# Patient Record
Sex: MALE | Race: WHITE | NOT HISPANIC OR LATINO
[De-identification: names, ages, dates, MRNs, and addresses within clinical notes are randomized per-mention and may not be internally consistent; named-entity substitution may affect disease eponyms.]

---

## 2017-02-15 ENCOUNTER — APPOINTMENT (OUTPATIENT)
Dept: INTERNAL MEDICINE | Facility: CLINIC | Age: 67
End: 2017-02-15

## 2017-02-16 ENCOUNTER — RESULT CHARGE (OUTPATIENT)
Age: 67
End: 2017-02-16

## 2017-02-16 LAB
GLUCOSE UR-MCNC: 0
HGB UR QL STRIP.AUTO: 0
PROT UR STRIP-MCNC: 0
SP GR UR STRIP: 1.02

## 2017-04-15 ENCOUNTER — EMERGENCY (EMERGENCY)
Facility: HOSPITAL | Age: 67
LOS: 0 days | Discharge: ANOTHER HOSPITAL | End: 2017-04-15
Attending: EMERGENCY MEDICINE
Payer: MEDICARE

## 2017-04-15 ENCOUNTER — TRANSCRIPTION ENCOUNTER (OUTPATIENT)
Age: 67
End: 2017-04-15

## 2017-04-15 ENCOUNTER — INPATIENT (INPATIENT)
Facility: HOSPITAL | Age: 67
LOS: 2 days | Discharge: HOME | End: 2017-04-18
Attending: SURGERY | Admitting: SURGERY
Payer: MEDICARE

## 2017-04-15 VITALS
SYSTOLIC BLOOD PRESSURE: 204 MMHG | WEIGHT: 270.07 LBS | TEMPERATURE: 98 F | HEIGHT: 70 IN | DIASTOLIC BLOOD PRESSURE: 100 MMHG | OXYGEN SATURATION: 97 % | RESPIRATION RATE: 20 BRPM | HEART RATE: 60 BPM

## 2017-04-15 VITALS
TEMPERATURE: 99 F | SYSTOLIC BLOOD PRESSURE: 121 MMHG | DIASTOLIC BLOOD PRESSURE: 74 MMHG | OXYGEN SATURATION: 96 % | HEART RATE: 66 BPM | RESPIRATION RATE: 16 BRPM

## 2017-04-15 VITALS
SYSTOLIC BLOOD PRESSURE: 123 MMHG | RESPIRATION RATE: 15 BRPM | OXYGEN SATURATION: 92 % | DIASTOLIC BLOOD PRESSURE: 67 MMHG | HEART RATE: 63 BPM | TEMPERATURE: 98 F

## 2017-04-15 DIAGNOSIS — E78.5 HYPERLIPIDEMIA, UNSPECIFIED: ICD-10-CM

## 2017-04-15 DIAGNOSIS — I71.3 ABDOMINAL AORTIC ANEURYSM, RUPTURED: ICD-10-CM

## 2017-04-15 DIAGNOSIS — F17.210 NICOTINE DEPENDENCE, CIGARETTES, UNCOMPLICATED: ICD-10-CM

## 2017-04-15 DIAGNOSIS — I25.10 ATHEROSCLEROTIC HEART DISEASE OF NATIVE CORONARY ARTERY WITHOUT ANGINA PECTORIS: ICD-10-CM

## 2017-04-15 DIAGNOSIS — R01.1 CARDIAC MURMUR, UNSPECIFIED: ICD-10-CM

## 2017-04-15 DIAGNOSIS — R10.9 UNSPECIFIED ABDOMINAL PAIN: ICD-10-CM

## 2017-04-15 DIAGNOSIS — I10 ESSENTIAL (PRIMARY) HYPERTENSION: ICD-10-CM

## 2017-04-15 DIAGNOSIS — I45.10 UNSPECIFIED RIGHT BUNDLE-BRANCH BLOCK: ICD-10-CM

## 2017-04-15 LAB
ALBUMIN SERPL ELPH-MCNC: 3.6 G/DL — SIGNIFICANT CHANGE UP (ref 3.3–5)
ALP SERPL-CCNC: 90 U/L — SIGNIFICANT CHANGE UP (ref 40–120)
ALT FLD-CCNC: 117 U/L — HIGH (ref 12–78)
ANION GAP SERPL CALC-SCNC: 12 MMOL/L — SIGNIFICANT CHANGE UP (ref 5–17)
APPEARANCE UR: ABNORMAL
APTT BLD: 24.9 SEC — LOW (ref 27.5–37.4)
AST SERPL-CCNC: 48 U/L — HIGH (ref 15–37)
BACTERIA # UR AUTO: ABNORMAL
BASOPHILS # BLD AUTO: 0.03 K/UL — SIGNIFICANT CHANGE UP (ref 0–0.2)
BASOPHILS # BLD AUTO: 0.1 K/UL — SIGNIFICANT CHANGE UP (ref 0–0.2)
BASOPHILS NFR BLD AUTO: 0.2 % — SIGNIFICANT CHANGE UP (ref 0–2)
BASOPHILS NFR BLD AUTO: 0.7 % — SIGNIFICANT CHANGE UP (ref 0–2)
BILIRUB SERPL-MCNC: 1 MG/DL — SIGNIFICANT CHANGE UP (ref 0.2–1.2)
BILIRUB UR-MCNC: NEGATIVE — SIGNIFICANT CHANGE UP
BUN SERPL-MCNC: 18 MG/DL — SIGNIFICANT CHANGE UP (ref 7–23)
CALCIUM SERPL-MCNC: 8.2 MG/DL — LOW (ref 8.5–10.1)
CHLORIDE SERPL-SCNC: 104 MMOL/L — SIGNIFICANT CHANGE UP (ref 96–108)
CK MB BLD-MCNC: 2.4 % — SIGNIFICANT CHANGE UP (ref 0–3.5)
CK MB CFR SERPL CALC: 5.8 NG/ML — HIGH (ref 0.5–3.6)
CK SERPL-CCNC: 246 U/L — SIGNIFICANT CHANGE UP (ref 26–308)
CO2 SERPL-SCNC: 23 MMOL/L — SIGNIFICANT CHANGE UP (ref 22–31)
COLOR SPEC: YELLOW — SIGNIFICANT CHANGE UP
CREAT SERPL-MCNC: 1.23 MG/DL — SIGNIFICANT CHANGE UP (ref 0.5–1.3)
DIFF PNL FLD: NEGATIVE — SIGNIFICANT CHANGE UP
EOSINOPHIL # BLD AUTO: 0 K/UL — SIGNIFICANT CHANGE UP (ref 0–0.5)
EOSINOPHIL # BLD AUTO: 0.01 K/UL — SIGNIFICANT CHANGE UP (ref 0–0.5)
EOSINOPHIL NFR BLD AUTO: 0.1 % — SIGNIFICANT CHANGE UP (ref 0–6)
EOSINOPHIL NFR BLD AUTO: 0.4 % — SIGNIFICANT CHANGE UP (ref 0–6)
GLUCOSE SERPL-MCNC: 197 MG/DL — HIGH (ref 70–99)
GLUCOSE UR QL: NEGATIVE MG/DL — SIGNIFICANT CHANGE UP
HCT VFR BLD CALC: 43.2 % — SIGNIFICANT CHANGE UP (ref 39–50)
HCT VFR BLD CALC: 43.5 % — SIGNIFICANT CHANGE UP (ref 39–50)
HGB BLD-MCNC: 13.8 G/DL — SIGNIFICANT CHANGE UP (ref 13–17)
HGB BLD-MCNC: 15 G/DL — SIGNIFICANT CHANGE UP (ref 13–17)
IMM GRANULOCYTES NFR BLD AUTO: 0.7 % — SIGNIFICANT CHANGE UP (ref 0–1.5)
INR BLD: 1.01 RATIO — SIGNIFICANT CHANGE UP (ref 0.88–1.16)
KETONES UR-MCNC: NEGATIVE — SIGNIFICANT CHANGE UP
LEUKOCYTE ESTERASE UR-ACNC: NEGATIVE — SIGNIFICANT CHANGE UP
LYMPHOCYTES # BLD AUTO: 1.5 K/UL — SIGNIFICANT CHANGE UP (ref 1–3.3)
LYMPHOCYTES # BLD AUTO: 1.56 K/UL — SIGNIFICANT CHANGE UP (ref 1–3.3)
LYMPHOCYTES # BLD AUTO: 10.6 % — LOW (ref 13–44)
LYMPHOCYTES # BLD AUTO: 11.1 % — LOW (ref 13–44)
MCHC RBC-ENTMCNC: 31.9 % — LOW (ref 32–36)
MCHC RBC-ENTMCNC: 31.9 PG — SIGNIFICANT CHANGE UP (ref 27–34)
MCHC RBC-ENTMCNC: 32.4 PG — SIGNIFICANT CHANGE UP (ref 27–34)
MCHC RBC-ENTMCNC: 34.3 GM/DL — SIGNIFICANT CHANGE UP (ref 32–36)
MCV RBC AUTO: 100 FL — SIGNIFICANT CHANGE UP (ref 80–100)
MCV RBC AUTO: 94.5 FL — SIGNIFICANT CHANGE UP (ref 80–100)
MONOCYTES # BLD AUTO: 0.5 K/UL — SIGNIFICANT CHANGE UP (ref 0–0.9)
MONOCYTES # BLD AUTO: 0.89 K/UL — SIGNIFICANT CHANGE UP (ref 0–0.9)
MONOCYTES NFR BLD AUTO: 3.8 % — SIGNIFICANT CHANGE UP (ref 2–14)
MONOCYTES NFR BLD AUTO: 6.1 % — SIGNIFICANT CHANGE UP (ref 2–14)
NEUTROPHILS # BLD AUTO: 11.3 K/UL — HIGH (ref 1.8–7.4)
NEUTROPHILS # BLD AUTO: 12.08 K/UL — HIGH (ref 1.8–7.4)
NEUTROPHILS NFR BLD AUTO: 82.3 % — HIGH (ref 43–77)
NEUTROPHILS NFR BLD AUTO: 84 % — HIGH (ref 43–77)
NITRITE UR-MCNC: NEGATIVE — SIGNIFICANT CHANGE UP
PH UR: 7 — SIGNIFICANT CHANGE UP (ref 4.8–8)
PLATELET # BLD AUTO: 179 K/UL — SIGNIFICANT CHANGE UP (ref 150–400)
PLATELET # BLD AUTO: 232 K/UL — SIGNIFICANT CHANGE UP (ref 150–400)
PMV BLD: 9.3 FL — SIGNIFICANT CHANGE UP (ref 7–13)
POTASSIUM SERPL-MCNC: 4.5 MMOL/L — SIGNIFICANT CHANGE UP (ref 3.5–5.3)
POTASSIUM SERPL-SCNC: 4.5 MMOL/L — SIGNIFICANT CHANGE UP (ref 3.5–5.3)
PROT SERPL-MCNC: 6.9 GM/DL — SIGNIFICANT CHANGE UP (ref 6–8.3)
PROT UR-MCNC: 15 MG/DL
PROTHROM AB SERPL-ACNC: 11 SEC — SIGNIFICANT CHANGE UP (ref 9.8–12.7)
RBC # BLD: 4.32 M/UL — SIGNIFICANT CHANGE UP (ref 4.2–5.8)
RBC # BLD: 4.61 M/UL — SIGNIFICANT CHANGE UP (ref 4.2–5.8)
RBC # FLD: 12.7 % — SIGNIFICANT CHANGE UP (ref 11–15)
RBC # FLD: 13.2 % — SIGNIFICANT CHANGE UP (ref 10.3–14.5)
RH IG SCN BLD-IMP: NEGATIVE — SIGNIFICANT CHANGE UP
SODIUM SERPL-SCNC: 139 MMOL/L — SIGNIFICANT CHANGE UP (ref 135–145)
SP GR SPEC: 1.01 — SIGNIFICANT CHANGE UP (ref 1.01–1.02)
TROPONIN I SERPL-MCNC: <.015 NG/ML — SIGNIFICANT CHANGE UP (ref 0.01–0.04)
UROBILINOGEN FLD QL: NEGATIVE MG/DL — SIGNIFICANT CHANGE UP
WBC # BLD: 13.5 K/UL — HIGH (ref 3.8–10.5)
WBC # BLD: 14.67 K/UL — HIGH (ref 3.8–10.5)
WBC # FLD AUTO: 13.5 K/UL — HIGH (ref 3.8–10.5)
WBC # FLD AUTO: 14.67 K/UL — HIGH (ref 3.8–10.5)

## 2017-04-15 PROCEDURE — 99223 1ST HOSP IP/OBS HIGH 75: CPT | Mod: 57,GC

## 2017-04-15 PROCEDURE — 71010: CPT | Mod: 26

## 2017-04-15 PROCEDURE — 99291 CRITICAL CARE FIRST HOUR: CPT

## 2017-04-15 PROCEDURE — 74176 CT ABD & PELVIS W/O CONTRAST: CPT | Mod: 26

## 2017-04-15 RX ORDER — MORPHINE SULFATE 50 MG/1
2 CAPSULE, EXTENDED RELEASE ORAL ONCE
Qty: 0 | Refills: 0 | Status: DISCONTINUED | OUTPATIENT
Start: 2017-04-15 | End: 2017-04-15

## 2017-04-15 RX ORDER — SODIUM CHLORIDE 9 MG/ML
3 INJECTION INTRAMUSCULAR; INTRAVENOUS; SUBCUTANEOUS ONCE
Qty: 0 | Refills: 0 | Status: COMPLETED | OUTPATIENT
Start: 2017-04-15 | End: 2017-04-15

## 2017-04-15 RX ORDER — SODIUM CHLORIDE 9 MG/ML
1000 INJECTION INTRAMUSCULAR; INTRAVENOUS; SUBCUTANEOUS ONCE
Qty: 0 | Refills: 0 | Status: COMPLETED | OUTPATIENT
Start: 2017-04-15 | End: 2017-04-15

## 2017-04-15 RX ADMIN — SODIUM CHLORIDE 1000 MILLILITER(S): 9 INJECTION INTRAMUSCULAR; INTRAVENOUS; SUBCUTANEOUS at 18:24

## 2017-04-15 RX ADMIN — SODIUM CHLORIDE 3 MILLILITER(S): 9 INJECTION INTRAMUSCULAR; INTRAVENOUS; SUBCUTANEOUS at 18:21

## 2017-04-15 RX ADMIN — MORPHINE SULFATE 2 MILLIGRAM(S): 50 CAPSULE, EXTENDED RELEASE ORAL at 19:29

## 2017-04-15 NOTE — ED PROVIDER NOTE - PSH
S/P hernia repair  ' 2007   Umbilical with Mesh  S/P hernia repair  5/2012   Umbilical with Mesh  S/P laminectomy  ' 90's   Lumbar  S/P tonsillectomy  childhood

## 2017-04-15 NOTE — ED PROVIDER NOTE - PSYCHIATRIC, MLM
Alert and oriented to person, place, time/situation. normal mood and affect. no apparent risk to self or others. No SI/HI

## 2017-04-15 NOTE — ED ADULT NURSE NOTE - CHIEF COMPLAINT QUOTE
Pt arrives as transfer from [facility], initially evaluated for left-sided abdominal pain and found to have retroperitoneal hemorrhage and AAA.  Pt taken directly to Trauma B.

## 2017-04-15 NOTE — ED ADULT NURSE NOTE - OBJECTIVE STATEMENT
Pt received to TR B aaox4 ambulatory as transfer from [facility] where PT was found to have bleeding into abdomen from  recent AAA repair site. 20 g IV at R. AC from NVS, New 20g IV obtained at L. AC. by MARIUM Torres Pre-op labs sent as ordered.  Pt placed on heart monitor for transport accompanied to OR by ED tech Elida, and surgical MDs, valuables taken to security by ED tech Tripp.  Report given to OR MARIUM.

## 2017-04-15 NOTE — ED PROVIDER NOTE - CRITICAL CARE PROVIDED
interpretation of diagnostic studies/consultation with other physicians/documentation/consult w/ pt's family directly relating to pts condition/direct patient care (not related to procedure)/additional history taking

## 2017-04-15 NOTE — ED PROVIDER NOTE - OBJECTIVE STATEMENT
Oz: 67 M, h/o AAA repair, transferred from [place], accepted by [facility]. Dr. Woods. P/w flank pain. CT w/ L retroperitoneal hemorrhage concerning for ruptured AAA (prior repaired, now leaking around that). VSS.

## 2017-04-15 NOTE — ED ADULT NURSE NOTE - PMH
AAA (abdominal aortic aneurysm)  dx: 10/2012  CAD (coronary artery disease)    Current smoker  1ppd X 40 years  Heart murmur    Herniated lumbar intervertebral disc  surgically treated ' 90's.    dx: 10/2012: recurrent herniated Lumbar Discs. Furthur workup to be done after AAA repair  HTN (hypertension)    Hyperlipidemia    Inguinal hernia  RIH ' 2007 surgically treated  Right bundle branch block    Umbilical hernia  5/2012 surgically treated

## 2017-04-15 NOTE — ED ADULT TRIAGE NOTE - CHIEF COMPLAINT QUOTE
Pt arrives as transfer from [facility], initially evaluated for left-sided abdominal pain and found to have retroperitoneal hemorrhage and AAA.  Pt taken directly to Trauma B.

## 2017-04-15 NOTE — ED PROVIDER NOTE - PROGRESS NOTE DETAILS
Pt signed out from Dr. Ha pending CT scan. Ct shows ruptured AAA. Vascular surgery consulted and transferred to [facility]. Pt hemodynamically stable.

## 2017-04-16 DIAGNOSIS — I71.4 ABDOMINAL AORTIC ANEURYSM, WITHOUT RUPTURE: ICD-10-CM

## 2017-04-16 LAB
ALBUMIN SERPL ELPH-MCNC: 4.1 G/DL — SIGNIFICANT CHANGE UP (ref 3.3–5)
ALP SERPL-CCNC: 77 U/L — SIGNIFICANT CHANGE UP (ref 40–120)
ALT FLD-CCNC: 92 U/L — HIGH (ref 4–41)
APTT BLD: 25.1 SEC — LOW (ref 27.5–37.4)
APTT BLD: 32.4 SEC — SIGNIFICANT CHANGE UP (ref 27.5–37.4)
AST SERPL-CCNC: 38 U/L — SIGNIFICANT CHANGE UP (ref 4–40)
BILIRUB SERPL-MCNC: 0.9 MG/DL — SIGNIFICANT CHANGE UP (ref 0.2–1.2)
BLD GP AB SCN SERPL QL: NEGATIVE — SIGNIFICANT CHANGE UP
BUN SERPL-MCNC: 22 MG/DL — SIGNIFICANT CHANGE UP (ref 7–23)
BUN SERPL-MCNC: 24 MG/DL — HIGH (ref 7–23)
BUN SERPL-MCNC: 31 MG/DL — HIGH (ref 7–23)
CALCIUM SERPL-MCNC: 7.7 MG/DL — LOW (ref 8.4–10.5)
CALCIUM SERPL-MCNC: 8.4 MG/DL — SIGNIFICANT CHANGE UP (ref 8.4–10.5)
CALCIUM SERPL-MCNC: 8.4 MG/DL — SIGNIFICANT CHANGE UP (ref 8.4–10.5)
CHLORIDE SERPL-SCNC: 102 MMOL/L — SIGNIFICANT CHANGE UP (ref 98–107)
CHLORIDE SERPL-SCNC: 103 MMOL/L — SIGNIFICANT CHANGE UP (ref 98–107)
CHLORIDE SERPL-SCNC: 98 MMOL/L — SIGNIFICANT CHANGE UP (ref 98–107)
CO2 SERPL-SCNC: 19 MMOL/L — LOW (ref 22–31)
CO2 SERPL-SCNC: 23 MMOL/L — SIGNIFICANT CHANGE UP (ref 22–31)
CO2 SERPL-SCNC: 23 MMOL/L — SIGNIFICANT CHANGE UP (ref 22–31)
CREAT SERPL-MCNC: 1.15 MG/DL — SIGNIFICANT CHANGE UP (ref 0.5–1.3)
CREAT SERPL-MCNC: 1.29 MG/DL — SIGNIFICANT CHANGE UP (ref 0.5–1.3)
CREAT SERPL-MCNC: 1.54 MG/DL — HIGH (ref 0.5–1.3)
GLUCOSE SERPL-MCNC: 133 MG/DL — HIGH (ref 70–99)
GLUCOSE SERPL-MCNC: 151 MG/DL — HIGH (ref 70–99)
GLUCOSE SERPL-MCNC: 178 MG/DL — HIGH (ref 70–99)
HCT VFR BLD CALC: 37.6 % — LOW (ref 39–50)
HGB BLD-MCNC: 12 G/DL — LOW (ref 13–17)
INR BLD: 0.97 — SIGNIFICANT CHANGE UP (ref 0.88–1.17)
INR BLD: 1.06 — SIGNIFICANT CHANGE UP (ref 0.88–1.17)
MAGNESIUM SERPL-MCNC: 1.8 MG/DL — SIGNIFICANT CHANGE UP (ref 1.6–2.6)
MCHC RBC-ENTMCNC: 31.9 % — LOW (ref 32–36)
MCHC RBC-ENTMCNC: 32.1 PG — SIGNIFICANT CHANGE UP (ref 27–34)
MCV RBC AUTO: 100.5 FL — HIGH (ref 80–100)
PHOSPHATE SERPL-MCNC: 2.9 MG/DL — SIGNIFICANT CHANGE UP (ref 2.5–4.5)
PLATELET # BLD AUTO: 166 K/UL — SIGNIFICANT CHANGE UP (ref 150–400)
PMV BLD: 9.3 FL — SIGNIFICANT CHANGE UP (ref 7–13)
POTASSIUM SERPL-MCNC: 4.4 MMOL/L — SIGNIFICANT CHANGE UP (ref 3.5–5.3)
POTASSIUM SERPL-MCNC: 5.1 MMOL/L — SIGNIFICANT CHANGE UP (ref 3.5–5.3)
POTASSIUM SERPL-MCNC: 5.4 MMOL/L — HIGH (ref 3.5–5.3)
POTASSIUM SERPL-SCNC: 4.4 MMOL/L — SIGNIFICANT CHANGE UP (ref 3.5–5.3)
POTASSIUM SERPL-SCNC: 5.1 MMOL/L — SIGNIFICANT CHANGE UP (ref 3.5–5.3)
POTASSIUM SERPL-SCNC: 5.4 MMOL/L — HIGH (ref 3.5–5.3)
PROT SERPL-MCNC: 6.8 G/DL — SIGNIFICANT CHANGE UP (ref 6–8.3)
PROTHROM AB SERPL-ACNC: 10.9 SEC — SIGNIFICANT CHANGE UP (ref 9.8–13.1)
PROTHROM AB SERPL-ACNC: 11.9 SEC — SIGNIFICANT CHANGE UP (ref 9.8–13.1)
RBC # BLD: 3.74 M/UL — LOW (ref 4.2–5.8)
RBC # FLD: 13.5 % — SIGNIFICANT CHANGE UP (ref 10.3–14.5)
SODIUM SERPL-SCNC: 136 MMOL/L — SIGNIFICANT CHANGE UP (ref 135–145)
SODIUM SERPL-SCNC: 138 MMOL/L — SIGNIFICANT CHANGE UP (ref 135–145)
SODIUM SERPL-SCNC: 139 MMOL/L — SIGNIFICANT CHANGE UP (ref 135–145)
WBC # BLD: 14.52 K/UL — HIGH (ref 3.8–10.5)
WBC # FLD AUTO: 14.52 K/UL — HIGH (ref 3.8–10.5)

## 2017-04-16 PROCEDURE — 99291 CRITICAL CARE FIRST HOUR: CPT

## 2017-04-16 PROCEDURE — 75630 X-RAY AORTA LEG ARTERIES: CPT | Mod: 26,GC

## 2017-04-16 PROCEDURE — 75952: CPT | Mod: 26,GC

## 2017-04-16 PROCEDURE — 36251 INS CATH REN ART 1ST UNILAT: CPT | Mod: GC

## 2017-04-16 PROCEDURE — 76937 US GUIDE VASCULAR ACCESS: CPT | Mod: 26,GC

## 2017-04-16 RX ORDER — TIOTROPIUM BROMIDE 18 UG/1
1 CAPSULE ORAL; RESPIRATORY (INHALATION) ONCE
Qty: 0 | Refills: 0 | Status: DISCONTINUED | OUTPATIENT
Start: 2017-04-16 | End: 2017-04-18

## 2017-04-16 RX ORDER — ACETAMINOPHEN 500 MG
650 TABLET ORAL EVERY 6 HOURS
Qty: 0 | Refills: 0 | Status: DISCONTINUED | OUTPATIENT
Start: 2017-04-16 | End: 2017-04-18

## 2017-04-16 RX ORDER — HYDRALAZINE HCL 50 MG
10 TABLET ORAL ONCE
Qty: 0 | Refills: 0 | Status: DISCONTINUED | OUTPATIENT
Start: 2017-04-16 | End: 2017-04-16

## 2017-04-16 RX ORDER — CALCIUM GLUCONATE 100 MG/ML
1 VIAL (ML) INTRAVENOUS ONCE
Qty: 0 | Refills: 0 | Status: COMPLETED | OUTPATIENT
Start: 2017-04-16 | End: 2017-04-16

## 2017-04-16 RX ORDER — SODIUM CHLORIDE 9 MG/ML
1000 INJECTION, SOLUTION INTRAVENOUS
Qty: 0 | Refills: 0 | Status: DISCONTINUED | OUTPATIENT
Start: 2017-04-16 | End: 2017-04-17

## 2017-04-16 RX ORDER — NITROGLYCERIN 6.5 MG
50 CAPSULE, EXTENDED RELEASE ORAL
Qty: 50 | Refills: 0 | Status: DISCONTINUED | OUTPATIENT
Start: 2017-04-16 | End: 2017-04-16

## 2017-04-16 RX ORDER — BUDESONIDE AND FORMOTEROL FUMARATE DIHYDRATE 160; 4.5 UG/1; UG/1
1 AEROSOL RESPIRATORY (INHALATION)
Qty: 0 | Refills: 0 | Status: DISCONTINUED | OUTPATIENT
Start: 2017-04-16 | End: 2017-04-18

## 2017-04-16 RX ORDER — ALBUTEROL 90 UG/1
2 AEROSOL, METERED ORAL EVERY 6 HOURS
Qty: 0 | Refills: 0 | Status: DISCONTINUED | OUTPATIENT
Start: 2017-04-16 | End: 2017-04-18

## 2017-04-16 RX ORDER — ATORVASTATIN CALCIUM 80 MG/1
40 TABLET, FILM COATED ORAL AT BEDTIME
Qty: 0 | Refills: 0 | Status: DISCONTINUED | OUTPATIENT
Start: 2017-04-16 | End: 2017-04-18

## 2017-04-16 RX ORDER — SODIUM CHLORIDE 9 MG/ML
1000 INJECTION INTRAMUSCULAR; INTRAVENOUS; SUBCUTANEOUS
Qty: 0 | Refills: 0 | Status: DISCONTINUED | OUTPATIENT
Start: 2017-04-16 | End: 2017-04-16

## 2017-04-16 RX ORDER — HEPARIN SODIUM 5000 [USP'U]/ML
5000 INJECTION INTRAVENOUS; SUBCUTANEOUS EVERY 8 HOURS
Qty: 0 | Refills: 0 | Status: DISCONTINUED | OUTPATIENT
Start: 2017-04-16 | End: 2017-04-18

## 2017-04-16 RX ORDER — OXYCODONE HYDROCHLORIDE 5 MG/1
5 TABLET ORAL EVERY 4 HOURS
Qty: 0 | Refills: 0 | Status: DISCONTINUED | OUTPATIENT
Start: 2017-04-16 | End: 2017-04-18

## 2017-04-16 RX ORDER — CEFAZOLIN SODIUM 1 G
1000 VIAL (EA) INJECTION EVERY 8 HOURS
Qty: 0 | Refills: 0 | Status: COMPLETED | OUTPATIENT
Start: 2017-04-16 | End: 2017-04-17

## 2017-04-16 RX ORDER — PANTOPRAZOLE SODIUM 20 MG/1
40 TABLET, DELAYED RELEASE ORAL
Qty: 0 | Refills: 0 | Status: DISCONTINUED | OUTPATIENT
Start: 2017-04-16 | End: 2017-04-18

## 2017-04-16 RX ORDER — ASPIRIN/CALCIUM CARB/MAGNESIUM 324 MG
81 TABLET ORAL DAILY
Qty: 0 | Refills: 0 | Status: DISCONTINUED | OUTPATIENT
Start: 2017-04-16 | End: 2017-04-18

## 2017-04-16 RX ORDER — SODIUM CHLORIDE 9 MG/ML
1000 INJECTION, SOLUTION INTRAVENOUS
Qty: 0 | Refills: 0 | Status: DISCONTINUED | OUTPATIENT
Start: 2017-04-16 | End: 2017-04-16

## 2017-04-16 RX ADMIN — Medication 100 MILLIGRAM(S): at 08:16

## 2017-04-16 RX ADMIN — Medication 200 GRAM(S): at 07:00

## 2017-04-16 RX ADMIN — HEPARIN SODIUM 5000 UNIT(S): 5000 INJECTION INTRAVENOUS; SUBCUTANEOUS at 14:02

## 2017-04-16 RX ADMIN — OXYCODONE HYDROCHLORIDE 5 MILLIGRAM(S): 5 TABLET ORAL at 18:23

## 2017-04-16 RX ADMIN — Medication 81 MILLIGRAM(S): at 11:56

## 2017-04-16 RX ADMIN — Medication 15 MICROGRAM(S)/MIN: at 04:15

## 2017-04-16 RX ADMIN — SODIUM CHLORIDE 50 MILLILITER(S): 9 INJECTION, SOLUTION INTRAVENOUS at 20:30

## 2017-04-16 RX ADMIN — OXYCODONE HYDROCHLORIDE 5 MILLIGRAM(S): 5 TABLET ORAL at 11:56

## 2017-04-16 RX ADMIN — SODIUM CHLORIDE 50 MILLILITER(S): 9 INJECTION INTRAMUSCULAR; INTRAVENOUS; SUBCUTANEOUS at 04:00

## 2017-04-16 RX ADMIN — Medication 100 MILLIGRAM(S): at 17:15

## 2017-04-16 RX ADMIN — PANTOPRAZOLE SODIUM 40 MILLIGRAM(S): 20 TABLET, DELAYED RELEASE ORAL at 11:56

## 2017-04-16 RX ADMIN — HEPARIN SODIUM 5000 UNIT(S): 5000 INJECTION INTRAVENOUS; SUBCUTANEOUS at 06:33

## 2017-04-16 RX ADMIN — SODIUM CHLORIDE 50 MILLILITER(S): 9 INJECTION, SOLUTION INTRAVENOUS at 22:09

## 2017-04-16 RX ADMIN — ALBUTEROL 2 PUFF(S): 90 AEROSOL, METERED ORAL at 12:44

## 2017-04-16 RX ADMIN — BUDESONIDE AND FORMOTEROL FUMARATE DIHYDRATE 1 PUFF(S): 160; 4.5 AEROSOL RESPIRATORY (INHALATION) at 20:52

## 2017-04-16 RX ADMIN — OXYCODONE HYDROCHLORIDE 5 MILLIGRAM(S): 5 TABLET ORAL at 12:00

## 2017-04-16 RX ADMIN — ATORVASTATIN CALCIUM 40 MILLIGRAM(S): 80 TABLET, FILM COATED ORAL at 21:37

## 2017-04-16 RX ADMIN — OXYCODONE HYDROCHLORIDE 5 MILLIGRAM(S): 5 TABLET ORAL at 18:55

## 2017-04-16 RX ADMIN — HEPARIN SODIUM 5000 UNIT(S): 5000 INJECTION INTRAVENOUS; SUBCUTANEOUS at 21:38

## 2017-04-16 NOTE — H&P ADULT. - PMH
AAA (abdominal aortic aneurysm)  dx: 10/2012  CAD (coronary artery disease)    Current smoker  1ppd X 40 years  Heart murmur    Herniated lumbar intervertebral disc  surgically treated ' 90's.    dx: 10/2012: recurrent herniated Lumbar Discs. Furthur workup to be done after AAA repair  HTN (hypertension)    Hyperlipidemia    Inguinal hernia  RIH ' 2007 surgically treated  Right bundle branch block    Umbilical hernia  5/2012 surgically treated AAA (abdominal aortic aneurysm)  dx: 10/2012  CAD (coronary artery disease)    Current smoker  1ppd X 40 years  Heart murmur    Herniated lumbar intervertebral disc  surgically treated ' 90's.    dx: 10/2012: recurrent herniated Lumbar Discs. Furthur workup to be done after AAA repair  HTN (hypertension)    Hyperlipidemia    Inguinal hernia  RIH ' 2007 surgically treated  JENSEN (obstructive sleep apnea)    Right bundle branch block    Umbilical hernia  5/2012 surgically treated

## 2017-04-16 NOTE — H&P ADULT. - ASSESSMENT
67M with ruptured AAA   -Admit to vascular surgery  -Taken emergently to OR for attempted endovascular repair   -SICU post-op

## 2017-04-16 NOTE — H&P ADULT. - HISTORY OF PRESENT ILLNESS
67 year old M s/p 2013 EVAR presented to [place] with abdominal pain. CT stone hunt done; demonstrated moderate left RP hemorrhage likely secondary to ruptured infrarenal AAA.  AAA is 6.4x7.4.  Transferred to [facility].  Upon arrival to [facility] hemodynamically stable.  Taken emergently  to OR for repair.

## 2017-04-17 ENCOUNTER — TRANSCRIPTION ENCOUNTER (OUTPATIENT)
Age: 67
End: 2017-04-17

## 2017-04-17 LAB
BUN SERPL-MCNC: 29 MG/DL — HIGH (ref 7–23)
CALCIUM SERPL-MCNC: 8.4 MG/DL — SIGNIFICANT CHANGE UP (ref 8.4–10.5)
CHLORIDE SERPL-SCNC: 104 MMOL/L — SIGNIFICANT CHANGE UP (ref 98–107)
CO2 SERPL-SCNC: 23 MMOL/L — SIGNIFICANT CHANGE UP (ref 22–31)
CREAT SERPL-MCNC: 1.15 MG/DL — SIGNIFICANT CHANGE UP (ref 0.5–1.3)
GLUCOSE SERPL-MCNC: 128 MG/DL — HIGH (ref 70–99)
HCT VFR BLD CALC: 34.6 % — LOW (ref 39–50)
HCT VFR BLD CALC: 36.4 % — LOW (ref 39–50)
HGB BLD-MCNC: 11 G/DL — LOW (ref 13–17)
HGB BLD-MCNC: 11.3 G/DL — LOW (ref 13–17)
MAGNESIUM SERPL-MCNC: 1.9 MG/DL — SIGNIFICANT CHANGE UP (ref 1.6–2.6)
MCHC RBC-ENTMCNC: 31 % — LOW (ref 32–36)
MCHC RBC-ENTMCNC: 31.6 PG — SIGNIFICANT CHANGE UP (ref 27–34)
MCHC RBC-ENTMCNC: 31.8 % — LOW (ref 32–36)
MCHC RBC-ENTMCNC: 32.1 PG — SIGNIFICANT CHANGE UP (ref 27–34)
MCV RBC AUTO: 100.9 FL — HIGH (ref 80–100)
MCV RBC AUTO: 101.7 FL — HIGH (ref 80–100)
PHOSPHATE SERPL-MCNC: 2.8 MG/DL — SIGNIFICANT CHANGE UP (ref 2.5–4.5)
PLATELET # BLD AUTO: 129 K/UL — LOW (ref 150–400)
PLATELET # BLD AUTO: 135 K/UL — LOW (ref 150–400)
PMV BLD: 9.5 FL — SIGNIFICANT CHANGE UP (ref 7–13)
PMV BLD: 9.5 FL — SIGNIFICANT CHANGE UP (ref 7–13)
POTASSIUM SERPL-MCNC: 4.5 MMOL/L — SIGNIFICANT CHANGE UP (ref 3.5–5.3)
POTASSIUM SERPL-SCNC: 4.5 MMOL/L — SIGNIFICANT CHANGE UP (ref 3.5–5.3)
RBC # BLD: 3.43 M/UL — LOW (ref 4.2–5.8)
RBC # BLD: 3.58 M/UL — LOW (ref 4.2–5.8)
RBC # FLD: 13.9 % — SIGNIFICANT CHANGE UP (ref 10.3–14.5)
RBC # FLD: 13.9 % — SIGNIFICANT CHANGE UP (ref 10.3–14.5)
SODIUM SERPL-SCNC: 140 MMOL/L — SIGNIFICANT CHANGE UP (ref 135–145)
WBC # BLD: 13.13 K/UL — HIGH (ref 3.8–10.5)
WBC # BLD: 13.14 K/UL — HIGH (ref 3.8–10.5)
WBC # FLD AUTO: 13.13 K/UL — HIGH (ref 3.8–10.5)
WBC # FLD AUTO: 13.14 K/UL — HIGH (ref 3.8–10.5)

## 2017-04-17 PROCEDURE — 99232 SBSQ HOSP IP/OBS MODERATE 35: CPT | Mod: GC

## 2017-04-17 RX ORDER — LOSARTAN POTASSIUM 100 MG/1
50 TABLET, FILM COATED ORAL DAILY
Qty: 0 | Refills: 0 | Status: DISCONTINUED | OUTPATIENT
Start: 2017-04-17 | End: 2017-04-18

## 2017-04-17 RX ADMIN — PANTOPRAZOLE SODIUM 40 MILLIGRAM(S): 20 TABLET, DELAYED RELEASE ORAL at 07:26

## 2017-04-17 RX ADMIN — OXYCODONE HYDROCHLORIDE 5 MILLIGRAM(S): 5 TABLET ORAL at 19:30

## 2017-04-17 RX ADMIN — Medication 100 MILLIGRAM(S): at 01:12

## 2017-04-17 RX ADMIN — OXYCODONE HYDROCHLORIDE 5 MILLIGRAM(S): 5 TABLET ORAL at 12:09

## 2017-04-17 RX ADMIN — OXYCODONE HYDROCHLORIDE 5 MILLIGRAM(S): 5 TABLET ORAL at 18:36

## 2017-04-17 RX ADMIN — ATORVASTATIN CALCIUM 40 MILLIGRAM(S): 80 TABLET, FILM COATED ORAL at 22:01

## 2017-04-17 RX ADMIN — OXYCODONE HYDROCHLORIDE 5 MILLIGRAM(S): 5 TABLET ORAL at 12:56

## 2017-04-17 RX ADMIN — HEPARIN SODIUM 5000 UNIT(S): 5000 INJECTION INTRAVENOUS; SUBCUTANEOUS at 05:06

## 2017-04-17 RX ADMIN — Medication 81 MILLIGRAM(S): at 12:09

## 2017-04-17 RX ADMIN — BUDESONIDE AND FORMOTEROL FUMARATE DIHYDRATE 1 PUFF(S): 160; 4.5 AEROSOL RESPIRATORY (INHALATION) at 09:39

## 2017-04-17 RX ADMIN — HEPARIN SODIUM 5000 UNIT(S): 5000 INJECTION INTRAVENOUS; SUBCUTANEOUS at 13:52

## 2017-04-17 RX ADMIN — HEPARIN SODIUM 5000 UNIT(S): 5000 INJECTION INTRAVENOUS; SUBCUTANEOUS at 22:01

## 2017-04-17 NOTE — PHYSICAL THERAPY INITIAL EVALUATION ADULT - PASSIVE RANGE OF MOTION EXAMINATION, REHAB EVAL
bilateral upper extremity Passive ROM was WNL (within normal limits)/bilateral lower extremity Passive ROM was WFL (within functional limits)

## 2017-04-17 NOTE — PHYSICAL THERAPY INITIAL EVALUATION ADULT - ACTIVE RANGE OF MOTION EXAMINATION, REHAB EVAL
rhys. upper extremity Active ROM was WNL (within normal limits)/Left Knee flexion ~50 degrees 2/2 to Left Lower quadrant abdominal pain/Right LE Active ROM was WFL (within functional limits)

## 2017-04-17 NOTE — DISCHARGE NOTE ADULT - CARE PLAN
Goal:	s/p type 1 a endoleak, proximal extension cuff with medtronic device.  Instructions for follow-up, activity and diet:	WOUND CARE:  Please keep incisions clean and dry. Please do not Scrub or rub incisions. Do not use lotion or powder on incisions  BATHING: Please do not submerge wound underwater. You may shower and/or sponge bathe.  ACTIVITY: No heavy lifting or straining. Otherwise, you may return to your usual level of physical activity. If you are taking narcotic pain medication (such as Percocet) DO NOT drive a car, operate machinery or make important decisions.  DIET: Return to your usual diet.  NOTIFY YOUR SURGEON IF: You have any bleeding that does not stop, any pus draining from your wound(s), any fever (over 100.4 F) or chills, persistent nausea/vomiting, persistent diarrhea, or if your pain is not controlled on your discharge pain medications.  FOLLOW-UP: Please follow up with your primary care physician in one week regarding your hospitalization.  Please follow up with your surgeon, Dr. Woods in 7 to 10 days. Principal Discharge DX:	AAA (abdominal aortic aneurysm)  Goal:	s/p type 1 a endoleak, proximal extension cuff with medtronic device.  Instructions for follow-up, activity and diet:	WOUND CARE:  Please keep incisions clean and dry. Please do not Scrub or rub incisions. Do not use lotion or powder on incisions  BATHING: Please do not submerge wound underwater. You may shower and/or sponge bathe.  ACTIVITY: No heavy lifting or straining. Otherwise, you may return to your usual level of physical activity. If you are taking narcotic pain medication (such as Percocet) DO NOT drive a car, operate machinery or make important decisions.  DIET: Return to your usual diet.  NOTIFY YOUR SURGEON IF: You have any bleeding that does not stop, any pus draining from your wound(s), any fever (over 100.4 F) or chills, persistent nausea/vomiting, persistent diarrhea, or if your pain is not controlled on your discharge pain medications.  FOLLOW-UP: Please follow up with your primary care physician in one week regarding your hospitalization.   You have been schedule for an appointment with Dr. Woods on 5/8 at 9:45 AM.  Please call his office to cancel or reschedule if needed.  Secondary Diagnosis:	HTN (hypertension)  Goal:	Adequate blood pressure control  Instructions for follow-up, activity and diet:	Please discuss your hospitalization with your pmd within 1 week of discharge. You are encouraged to discuss smoking cessation further with your primary care doctor.

## 2017-04-17 NOTE — DISCHARGE NOTE ADULT - CARE PROVIDER_API CALL
Nevaeh Woods), Surgery  Vascular  [address] [place] [address]  Phone: [phone]  Fax: [phone]

## 2017-04-17 NOTE — DISCHARGE NOTE ADULT - MEDICATION SUMMARY - MEDICATIONS TO TAKE
I will START or STAY ON the medications listed below when I get home from the hospital:    acetaminophen 325 mg oral tablet  -- 2 tab(s) by mouth every 6 hours, As needed, mild and moderate pain  -- Indication: For pain medication    aspirin 81 mg oral tablet, chewable  -- 1 tab(s) by mouth once a day  -- Indication: For cardiac prophylaxis    oxyCODONE 5 mg oral tablet  -- 1 tab(s) by mouth every 6 hours, As Needed, Severe Pain (7 - 10) MDD:6  -- Indication: For severe pain    atorvastatin 40 mg oral tablet  -- 1 tab(s) by mouth once a day (at bedtime)  -- Indication: For hld    irbesartan-hydroCHLOROthiazide 150mg-12.5mg oral tablet  -- 1 tab(s) by mouth once a day  -- Indication: For htn    omeprazole 40 mg oral delayed release capsule  -- 1 cap(s) by mouth once a day  -- Indication: For grd

## 2017-04-17 NOTE — DISCHARGE NOTE ADULT - HOSPITAL COURSE
67 year old M s/p 2013 EVAR presented to [place] with abdominal pain. CT stone hunt done; demonstrated moderate left RP hemorrhage likely secondary to ruptured infrarenal AAA.  AAA is 6.4x7.4.  Transferred to [facility].  Upon arrival to [facility] hemodynamically stable.  Taken emergently  to OR for repair.    He had a type 1 a endoleak, proximal extension cuff with medtronic device, aptus. no endoleak shown, post op he went to the surgical floor. He was on supplemental oxygen for low O2 sat. He was weaned off of it during his stay.   During hospital course patients diet was slowly advanced as tolerated.  At this time, pt is tolerating a regular diet, ambulating and voiding.  Pt is stable for discharge as per the attending at this time. Mr. Lee is a 67 year old M s/p 2013 EVAR presented to [place] with abdominal pain. CT stone benedict demonstrated moderate left RP hemorrhage likely secondary to ruptured infrarenal AAA that measured 6.4x7.4.  He was transferred to [facility] and taken emergently to OR for repair.    Patient was found to have a type 1 a endoleak. A proximal extension cuff with medtronic device was placed. Postoperatively, he was monitored in the SICU, then transferred to the surgical floor on POD#2. He was on supplemental oxygen for low O2 sat, and was weaned off of it during his stay. CBC remaiend stasb  During hospital course patients diet was slowly advanced as tolerated.  At this time, pt is tolerating a regular diet, ambulating and voiding. He is stable for discharge as per the attending at this time. Mr. Lee is a 67 year old M s/p 2013 EVAR presented to [place] with abdominal pain. CT stone benedict demonstrated moderate left RP hemorrhage likely secondary to ruptured infrarenal AAA that measured 6.4x7.4.  He was transferred to [facility] and taken emergently to OR for repair.    Patient was found to have a type 1 a endoleak. A proximal extension cuff with medtronic device was placed. Postoperatively, he was monitored in the SICU, then transferred to the surgical floor on POD#2. He was on supplemental oxygen for low O2 sat, and was weaned off of it during his stay. CBC remained stable.  During hospital course patients diet was slowly advanced as tolerated.  At this time, pt is tolerating a regular diet, ambulating and voiding. He is stable for discharge as per the attending at this time.

## 2017-04-17 NOTE — DISCHARGE NOTE ADULT - ADDITIONAL INSTRUCTIONS
Ok to shower and rinse wound with warm soapy water.  Do not scrub wound, pat dry. Please call the doctor immediately if you develop fever, chills, inability to tolerate liquid or food, diarrhea, nausea, vomiting, back pain, abdominal pain, persistent head ache or draiange/swelling/bleeding/ purulent discharge from your wound. Follow a regular (low salt, low cholesterol) Do not drive or operate machinery while taking narcotic pain medication. Please follow up with your primary care doctor within 1 week of discharge.

## 2017-04-17 NOTE — DISCHARGE NOTE ADULT - FINDINGS/TREATMENT
Patient was found to have a type 1 a endoleak. A proximal extension cuff with medtronic device was placed

## 2017-04-17 NOTE — DISCHARGE NOTE ADULT - PATIENT PORTAL LINK FT
“You can access the [employer] Patient Portal, offered by [facility], by registering with the following website: [email or web address]”

## 2017-04-17 NOTE — PHYSICAL THERAPY INITIAL EVALUATION ADULT - PRECAUTIONS/LIMITATIONS, REHAB EVAL
fall precautions/surgical precautions/AAA Repair fall precautions/surgical precautions/AAA Repair/cardiac precautions

## 2017-04-17 NOTE — PHYSICAL THERAPY INITIAL EVALUATION ADULT - PATIENT PROFILE REVIEW, REHAB EVAL
ACTIVITY: Ambulate as Tolerated; Spoke with RN Paulina Redd prior to PT evaluation--> Pt OK for ambulation/yes

## 2017-04-17 NOTE — PHYSICAL THERAPY INITIAL EVALUATION ADULT - ADDITIONAL COMMENTS
Pt reports that he lives in a prive [place] with wife and son with 2 steps to negotiate to enter; bedroom on first floor. Prior to hospital admission pt was completely independent and used no assistive device. Pt does however have rolling walker from last AAA Repair.    During ambulation pt desaturated to 77%; instructed in deep breathing until O2 sats reached 90%.    Pt was left comfortable in bed, NAD, all lines intact, all precautions maintained, with call cordero in reach, and RN Paulina aware of PT evaluation Pt reports that he lives in a prive house ([place]) with wife and son with 2 steps to negotiate to enter; bedroom on first floor. Prior to hospital admission pt was completely independent and used no assistive device. Pt does however have rolling walker from last AAA Repair.    During ambulation pt desaturated to 77%; instructed in deep breathing until O2 sats reached 90%, RN aware.    Pt was left comfortable in bed, NAD, all lines intact, all precautions maintained, with call cordero in reach, and RN Paulina aware of PT evaluation

## 2017-04-17 NOTE — DISCHARGE NOTE ADULT - PLAN OF CARE
s/p type 1 a endoleak, proximal extension cuff with medtronic device. WOUND CARE:  Please keep incisions clean and dry. Please do not Scrub or rub incisions. Do not use lotion or powder on incisions  BATHING: Please do not submerge wound underwater. You may shower and/or sponge bathe.  ACTIVITY: No heavy lifting or straining. Otherwise, you may return to your usual level of physical activity. If you are taking narcotic pain medication (such as Percocet) DO NOT drive a car, operate machinery or make important decisions.  DIET: Return to your usual diet.  NOTIFY YOUR SURGEON IF: You have any bleeding that does not stop, any pus draining from your wound(s), any fever (over 100.4 F) or chills, persistent nausea/vomiting, persistent diarrhea, or if your pain is not controlled on your discharge pain medications.  FOLLOW-UP: Please follow up with your primary care physician in one week regarding your hospitalization.  Please follow up with your surgeon, Dr. Woods in 7 to 10 days. Adequate blood pressure control Please discuss your hospitalization with your pmd within 1 week of discharge. You are encouraged to discuss smoking cessation further with your primary care doctor. WOUND CARE:  Please keep incisions clean and dry. Please do not Scrub or rub incisions. Do not use lotion or powder on incisions  BATHING: Please do not submerge wound underwater. You may shower and/or sponge bathe.  ACTIVITY: No heavy lifting or straining. Otherwise, you may return to your usual level of physical activity. If you are taking narcotic pain medication (such as Percocet) DO NOT drive a car, operate machinery or make important decisions.  DIET: Return to your usual diet.  NOTIFY YOUR SURGEON IF: You have any bleeding that does not stop, any pus draining from your wound(s), any fever (over 100.4 F) or chills, persistent nausea/vomiting, persistent diarrhea, or if your pain is not controlled on your discharge pain medications.  FOLLOW-UP: Please follow up with your primary care physician in one week regarding your hospitalization.   You have been schedule for an appointment with Dr. Woods on 5/8 at 9:45 AM.  Please call his office to cancel or reschedule if needed.

## 2017-04-17 NOTE — DISCHARGE NOTE ADULT - NS AS ACTIVITY OBS
No Heavy lifting/straining/Please follow a regular diet. Do not drive or operate machinery while taking narcotic pain medication.  Do not perform heavy lifting or strenuous activity./Do not drive or operate machinery/Do not make important decisions/Walking-Outdoors allowed/Showering allowed/Walking-Indoors allowed

## 2017-04-18 VITALS
DIASTOLIC BLOOD PRESSURE: 54 MMHG | RESPIRATION RATE: 18 BRPM | SYSTOLIC BLOOD PRESSURE: 105 MMHG | HEART RATE: 64 BPM | TEMPERATURE: 99 F | OXYGEN SATURATION: 92 %

## 2017-04-18 LAB
BUN SERPL-MCNC: 24 MG/DL — HIGH (ref 7–23)
CALCIUM SERPL-MCNC: 8.8 MG/DL — SIGNIFICANT CHANGE UP (ref 8.4–10.5)
CHLORIDE SERPL-SCNC: 99 MMOL/L — SIGNIFICANT CHANGE UP (ref 98–107)
CO2 SERPL-SCNC: 23 MMOL/L — SIGNIFICANT CHANGE UP (ref 22–31)
CREAT SERPL-MCNC: 1 MG/DL — SIGNIFICANT CHANGE UP (ref 0.5–1.3)
GLUCOSE SERPL-MCNC: 116 MG/DL — HIGH (ref 70–99)
HCT VFR BLD CALC: 35.1 % — LOW (ref 39–50)
HGB BLD-MCNC: 11 G/DL — LOW (ref 13–17)
MAGNESIUM SERPL-MCNC: 2.1 MG/DL — SIGNIFICANT CHANGE UP (ref 1.6–2.6)
MCHC RBC-ENTMCNC: 31.3 % — LOW (ref 32–36)
MCHC RBC-ENTMCNC: 31.9 PG — SIGNIFICANT CHANGE UP (ref 27–34)
MCV RBC AUTO: 101.7 FL — HIGH (ref 80–100)
PHOSPHATE SERPL-MCNC: 2 MG/DL — LOW (ref 2.5–4.5)
PLATELET # BLD AUTO: 145 K/UL — LOW (ref 150–400)
PMV BLD: 9.1 FL — SIGNIFICANT CHANGE UP (ref 7–13)
POTASSIUM SERPL-MCNC: 4.4 MMOL/L — SIGNIFICANT CHANGE UP (ref 3.5–5.3)
POTASSIUM SERPL-SCNC: 4.4 MMOL/L — SIGNIFICANT CHANGE UP (ref 3.5–5.3)
RBC # BLD: 3.45 M/UL — LOW (ref 4.2–5.8)
RBC # FLD: 13.9 % — SIGNIFICANT CHANGE UP (ref 10.3–14.5)
SODIUM SERPL-SCNC: 137 MMOL/L — SIGNIFICANT CHANGE UP (ref 135–145)
WBC # BLD: 14.61 K/UL — HIGH (ref 3.8–10.5)
WBC # FLD AUTO: 14.61 K/UL — HIGH (ref 3.8–10.5)

## 2017-04-18 PROCEDURE — 99232 SBSQ HOSP IP/OBS MODERATE 35: CPT | Mod: GC

## 2017-04-18 RX ORDER — ASPIRIN/CALCIUM CARB/MAGNESIUM 324 MG
1 TABLET ORAL
Qty: 0 | Refills: 0 | DISCHARGE
Start: 2017-04-18

## 2017-04-18 RX ORDER — OXYCODONE HYDROCHLORIDE 5 MG/1
1 TABLET ORAL
Qty: 30 | Refills: 0
Start: 2017-04-18

## 2017-04-18 RX ORDER — SODIUM,POTASSIUM PHOSPHATES 278-250MG
2 POWDER IN PACKET (EA) ORAL ONCE
Qty: 0 | Refills: 0 | Status: COMPLETED | OUTPATIENT
Start: 2017-04-18 | End: 2017-04-18

## 2017-04-18 RX ORDER — ACETAMINOPHEN 500 MG
2 TABLET ORAL
Qty: 0 | Refills: 0 | DISCHARGE
Start: 2017-04-18

## 2017-04-18 RX ADMIN — LOSARTAN POTASSIUM 50 MILLIGRAM(S): 100 TABLET, FILM COATED ORAL at 05:08

## 2017-04-18 RX ADMIN — BUDESONIDE AND FORMOTEROL FUMARATE DIHYDRATE 1 PUFF(S): 160; 4.5 AEROSOL RESPIRATORY (INHALATION) at 09:13

## 2017-04-18 RX ADMIN — HEPARIN SODIUM 5000 UNIT(S): 5000 INJECTION INTRAVENOUS; SUBCUTANEOUS at 05:07

## 2017-04-18 RX ADMIN — Medication 2 TABLET(S): at 10:54

## 2017-04-18 RX ADMIN — PANTOPRAZOLE SODIUM 40 MILLIGRAM(S): 20 TABLET, DELAYED RELEASE ORAL at 07:07

## 2017-05-05 ENCOUNTER — APPOINTMENT (OUTPATIENT)
Dept: VASCULAR SURGERY | Facility: CLINIC | Age: 67
End: 2017-05-05

## 2017-05-05 VITALS
SYSTOLIC BLOOD PRESSURE: 162 MMHG | HEIGHT: 70 IN | TEMPERATURE: 98.4 F | DIASTOLIC BLOOD PRESSURE: 98 MMHG | WEIGHT: 268 LBS | BODY MASS INDEX: 38.37 KG/M2 | HEART RATE: 62 BPM

## 2017-08-09 ENCOUNTER — APPOINTMENT (OUTPATIENT)
Dept: CT IMAGING | Facility: CLINIC | Age: 67
End: 2017-08-09
Payer: MEDICARE

## 2017-08-09 ENCOUNTER — OUTPATIENT (OUTPATIENT)
Dept: OUTPATIENT SERVICES | Facility: HOSPITAL | Age: 67
LOS: 1 days | End: 2017-08-09
Payer: MEDICARE

## 2017-08-09 DIAGNOSIS — Z00.8 ENCOUNTER FOR OTHER GENERAL EXAMINATION: ICD-10-CM

## 2017-08-09 PROCEDURE — 71275 CT ANGIOGRAPHY CHEST: CPT | Mod: 26

## 2017-08-09 PROCEDURE — 82565 ASSAY OF CREATININE: CPT

## 2017-08-09 PROCEDURE — 74174 CTA ABD&PLVS W/CONTRAST: CPT | Mod: 26

## 2017-08-09 PROCEDURE — 74174 CTA ABD&PLVS W/CONTRAST: CPT

## 2017-08-09 PROCEDURE — 71275 CT ANGIOGRAPHY CHEST: CPT

## 2017-08-18 ENCOUNTER — APPOINTMENT (OUTPATIENT)
Dept: VASCULAR SURGERY | Facility: CLINIC | Age: 67
End: 2017-08-18
Payer: MEDICARE

## 2017-08-18 VITALS
SYSTOLIC BLOOD PRESSURE: 135 MMHG | HEIGHT: 70 IN | WEIGHT: 268 LBS | HEART RATE: 54 BPM | TEMPERATURE: 97.9 F | DIASTOLIC BLOOD PRESSURE: 73 MMHG | BODY MASS INDEX: 38.37 KG/M2

## 2017-08-18 DIAGNOSIS — T82.330A LEAKAGE OF AORTIC (BIFURCATION) GRAFT (REPLACEMENT), INITIAL ENCOUNTER: ICD-10-CM

## 2017-08-18 DIAGNOSIS — Z78.9 OTHER SPECIFIED HEALTH STATUS: ICD-10-CM

## 2017-08-18 PROCEDURE — 99214 OFFICE O/P EST MOD 30 MIN: CPT

## 2018-02-13 ENCOUNTER — APPOINTMENT (OUTPATIENT)
Dept: INTERNAL MEDICINE | Facility: CLINIC | Age: 68
End: 2018-02-13
Payer: COMMERCIAL

## 2018-02-13 LAB
GLUCOSE UR-MCNC: 0
HGB UR QL STRIP.AUTO: 0
PROT UR STRIP-MCNC: 0
SP GR UR STRIP: 1.03

## 2018-02-13 PROCEDURE — 99397 PER PM REEVAL EST PAT 65+ YR: CPT

## 2018-04-20 ENCOUNTER — APPOINTMENT (OUTPATIENT)
Dept: VASCULAR SURGERY | Facility: CLINIC | Age: 68
End: 2018-04-20
Payer: MEDICARE

## 2018-04-20 VITALS
DIASTOLIC BLOOD PRESSURE: 108 MMHG | HEART RATE: 58 BPM | BODY MASS INDEX: 40.52 KG/M2 | TEMPERATURE: 97.9 F | WEIGHT: 283 LBS | HEIGHT: 70 IN | SYSTOLIC BLOOD PRESSURE: 189 MMHG

## 2018-04-20 PROCEDURE — 99213 OFFICE O/P EST LOW 20 MIN: CPT

## 2018-05-22 ENCOUNTER — OUTPATIENT (OUTPATIENT)
Dept: OUTPATIENT SERVICES | Facility: HOSPITAL | Age: 68
LOS: 1 days | End: 2018-05-22
Payer: MEDICARE

## 2018-05-22 ENCOUNTER — APPOINTMENT (OUTPATIENT)
Dept: CT IMAGING | Facility: CLINIC | Age: 68
End: 2018-05-22
Payer: MEDICARE

## 2018-05-22 DIAGNOSIS — Z00.8 ENCOUNTER FOR OTHER GENERAL EXAMINATION: ICD-10-CM

## 2018-05-22 PROCEDURE — 74174 CTA ABD&PLVS W/CONTRAST: CPT | Mod: 26

## 2018-05-22 PROCEDURE — 82565 ASSAY OF CREATININE: CPT

## 2018-05-22 PROCEDURE — 74174 CTA ABD&PLVS W/CONTRAST: CPT

## 2018-05-25 ENCOUNTER — APPOINTMENT (OUTPATIENT)
Dept: VASCULAR SURGERY | Facility: CLINIC | Age: 68
End: 2018-05-25
Payer: MEDICARE

## 2018-05-25 VITALS
TEMPERATURE: 97.9 F | HEIGHT: 71 IN | WEIGHT: 290 LBS | DIASTOLIC BLOOD PRESSURE: 101 MMHG | SYSTOLIC BLOOD PRESSURE: 172 MMHG | HEART RATE: 52 BPM | BODY MASS INDEX: 40.6 KG/M2

## 2018-05-25 DIAGNOSIS — I71.4 ABDOMINAL AORTIC ANEURYSM, W/OUT RUPTURE: ICD-10-CM

## 2018-05-25 PROCEDURE — 99213 OFFICE O/P EST LOW 20 MIN: CPT

## 2018-06-06 PROBLEM — I71.4 ABDOMINAL AORTIC ANEURYSM (AAA) WITHOUT RUPTURE: Status: ACTIVE | Noted: 2017-08-18

## 2018-11-30 ENCOUNTER — APPOINTMENT (OUTPATIENT)
Dept: VASCULAR SURGERY | Facility: CLINIC | Age: 68
End: 2018-11-30

## 2019-01-01 ENCOUNTER — INPATIENT (INPATIENT)
Facility: HOSPITAL | Age: 69
LOS: 2 days | Discharge: HOME | DRG: 394 | End: 2019-04-11
Attending: HOSPITALIST | Admitting: STUDENT IN AN ORGANIZED HEALTH CARE EDUCATION/TRAINING PROGRAM
Payer: MEDICARE

## 2019-01-01 ENCOUNTER — TRANSCRIPTION ENCOUNTER (OUTPATIENT)
Age: 69
End: 2019-01-01

## 2019-01-01 ENCOUNTER — RESULT REVIEW (OUTPATIENT)
Age: 69
End: 2019-01-01

## 2019-01-01 ENCOUNTER — MESSAGE (OUTPATIENT)
Age: 69
End: 2019-01-01

## 2019-01-01 ENCOUNTER — INBOUND DOCUMENT (OUTPATIENT)
Age: 69
End: 2019-01-01

## 2019-01-01 VITALS
HEIGHT: 70 IN | TEMPERATURE: 98 F | OXYGEN SATURATION: 95 % | WEIGHT: 272.93 LBS | RESPIRATION RATE: 18 BRPM | DIASTOLIC BLOOD PRESSURE: 85 MMHG | SYSTOLIC BLOOD PRESSURE: 170 MMHG | HEART RATE: 74 BPM

## 2019-01-01 VITALS — HEART RATE: 89 BPM | OXYGEN SATURATION: 96 %

## 2019-01-01 DIAGNOSIS — G47.33 OBSTRUCTIVE SLEEP APNEA (ADULT) (PEDIATRIC): ICD-10-CM

## 2019-01-01 DIAGNOSIS — I71.4 ABDOMINAL AORTIC ANEURYSM, WITHOUT RUPTURE: ICD-10-CM

## 2019-01-01 DIAGNOSIS — I25.10 ATHEROSCLEROTIC HEART DISEASE OF NATIVE CORONARY ARTERY WITHOUT ANGINA PECTORIS: ICD-10-CM

## 2019-01-01 DIAGNOSIS — K92.2 GASTROINTESTINAL HEMORRHAGE, UNSPECIFIED: ICD-10-CM

## 2019-01-01 DIAGNOSIS — K62.5 HEMORRHAGE OF ANUS AND RECTUM: ICD-10-CM

## 2019-01-01 DIAGNOSIS — Z29.9 ENCOUNTER FOR PROPHYLACTIC MEASURES, UNSPECIFIED: ICD-10-CM

## 2019-01-01 DIAGNOSIS — E78.5 HYPERLIPIDEMIA, UNSPECIFIED: ICD-10-CM

## 2019-01-01 DIAGNOSIS — I10 ESSENTIAL (PRIMARY) HYPERTENSION: ICD-10-CM

## 2019-01-01 DIAGNOSIS — K52.9 NONINFECTIVE GASTROENTERITIS AND COLITIS, UNSPECIFIED: ICD-10-CM

## 2019-01-01 LAB
ALBUMIN SERPL ELPH-MCNC: 3.6 G/DL — SIGNIFICANT CHANGE UP (ref 3.3–5)
ALBUMIN SERPL ELPH-MCNC: 3.8 G/DL — SIGNIFICANT CHANGE UP (ref 3.3–5)
ALP SERPL-CCNC: 75 U/L — SIGNIFICANT CHANGE UP (ref 40–120)
ALP SERPL-CCNC: 87 U/L — SIGNIFICANT CHANGE UP (ref 40–120)
ALT FLD-CCNC: 51 U/L — HIGH (ref 10–45)
ALT FLD-CCNC: 55 U/L — HIGH (ref 10–45)
ANION GAP SERPL CALC-SCNC: 12 MMOL/L — SIGNIFICANT CHANGE UP (ref 5–17)
ANION GAP SERPL CALC-SCNC: 13 MMOL/L — SIGNIFICANT CHANGE UP (ref 5–17)
ANION GAP SERPL CALC-SCNC: 15 MMOL/L — SIGNIFICANT CHANGE UP (ref 5–17)
APPEARANCE UR: CLEAR — SIGNIFICANT CHANGE UP
APTT BLD: 26.2 SEC — LOW (ref 27.5–36.3)
AST SERPL-CCNC: 30 U/L — SIGNIFICANT CHANGE UP (ref 10–40)
AST SERPL-CCNC: 34 U/L — SIGNIFICANT CHANGE UP (ref 10–40)
BACTERIA # UR AUTO: NEGATIVE — SIGNIFICANT CHANGE UP
BASE EXCESS BLDV CALC-SCNC: 4 MMOL/L — HIGH (ref -2–2)
BASOPHILS # BLD AUTO: 0 K/UL — SIGNIFICANT CHANGE UP (ref 0–0.2)
BASOPHILS # BLD AUTO: 0.1 K/UL — SIGNIFICANT CHANGE UP (ref 0–0.2)
BASOPHILS # BLD AUTO: 0.1 K/UL — SIGNIFICANT CHANGE UP (ref 0–0.2)
BASOPHILS NFR BLD AUTO: 0.1 % — SIGNIFICANT CHANGE UP (ref 0–2)
BASOPHILS NFR BLD AUTO: 0.5 % — SIGNIFICANT CHANGE UP (ref 0–2)
BASOPHILS NFR BLD AUTO: 0.6 % — SIGNIFICANT CHANGE UP (ref 0–2)
BILIRUB SERPL-MCNC: 0.9 MG/DL — SIGNIFICANT CHANGE UP (ref 0.2–1.2)
BILIRUB SERPL-MCNC: 1 MG/DL — SIGNIFICANT CHANGE UP (ref 0.2–1.2)
BILIRUB UR-MCNC: NEGATIVE — SIGNIFICANT CHANGE UP
BLD GP AB SCN SERPL QL: NEGATIVE — SIGNIFICANT CHANGE UP
BUN SERPL-MCNC: 17 MG/DL — SIGNIFICANT CHANGE UP (ref 7–23)
BUN SERPL-MCNC: 18 MG/DL — SIGNIFICANT CHANGE UP (ref 7–23)
BUN SERPL-MCNC: 18 MG/DL — SIGNIFICANT CHANGE UP (ref 7–23)
CA-I SERPL-SCNC: 1.06 MMOL/L — LOW (ref 1.12–1.3)
CALCIUM SERPL-MCNC: 8.8 MG/DL — SIGNIFICANT CHANGE UP (ref 8.4–10.5)
CALCIUM SERPL-MCNC: 9.1 MG/DL — SIGNIFICANT CHANGE UP (ref 8.4–10.5)
CALCIUM SERPL-MCNC: 9.2 MG/DL — SIGNIFICANT CHANGE UP (ref 8.4–10.5)
CHLORIDE BLDV-SCNC: 110 MMOL/L — HIGH (ref 96–108)
CHLORIDE SERPL-SCNC: 100 MMOL/L — SIGNIFICANT CHANGE UP (ref 96–108)
CHLORIDE SERPL-SCNC: 102 MMOL/L — SIGNIFICANT CHANGE UP (ref 96–108)
CHLORIDE SERPL-SCNC: 103 MMOL/L — SIGNIFICANT CHANGE UP (ref 96–108)
CHOLEST SERPL-MCNC: 134 MG/DL — SIGNIFICANT CHANGE UP (ref 10–199)
CO2 BLDV-SCNC: 31 MMOL/L — HIGH (ref 22–30)
CO2 SERPL-SCNC: 22 MMOL/L — SIGNIFICANT CHANGE UP (ref 22–31)
CO2 SERPL-SCNC: 24 MMOL/L — SIGNIFICANT CHANGE UP (ref 22–31)
CO2 SERPL-SCNC: 25 MMOL/L — SIGNIFICANT CHANGE UP (ref 22–31)
COLOR SPEC: YELLOW — SIGNIFICANT CHANGE UP
CREAT SERPL-MCNC: 1.04 MG/DL — SIGNIFICANT CHANGE UP (ref 0.5–1.3)
CREAT SERPL-MCNC: 1.18 MG/DL — SIGNIFICANT CHANGE UP (ref 0.5–1.3)
CREAT SERPL-MCNC: 1.33 MG/DL — HIGH (ref 0.5–1.3)
CULTURE RESULTS: SIGNIFICANT CHANGE UP
DIFF PNL FLD: NEGATIVE — SIGNIFICANT CHANGE UP
EOSINOPHIL # BLD AUTO: 0.1 K/UL — SIGNIFICANT CHANGE UP (ref 0–0.5)
EOSINOPHIL # BLD AUTO: 0.1 K/UL — SIGNIFICANT CHANGE UP (ref 0–0.5)
EOSINOPHIL # BLD AUTO: 0.2 K/UL — SIGNIFICANT CHANGE UP (ref 0–0.5)
EOSINOPHIL NFR BLD AUTO: 0.6 % — SIGNIFICANT CHANGE UP (ref 0–6)
EOSINOPHIL NFR BLD AUTO: 0.9 % — SIGNIFICANT CHANGE UP (ref 0–6)
EOSINOPHIL NFR BLD AUTO: 2 % — SIGNIFICANT CHANGE UP (ref 0–6)
EPI CELLS # UR: 1 /HPF — SIGNIFICANT CHANGE UP
FERRITIN SERPL-MCNC: 473 NG/ML — HIGH (ref 30–400)
FOLATE SERPL-MCNC: 15.9 NG/ML — SIGNIFICANT CHANGE UP
GAS PNL BLDV: 129 MMOL/L — LOW (ref 136–145)
GAS PNL BLDV: SIGNIFICANT CHANGE UP
GAS PNL BLDV: SIGNIFICANT CHANGE UP
GLUCOSE BLDV-MCNC: 95 MG/DL — SIGNIFICANT CHANGE UP (ref 70–99)
GLUCOSE SERPL-MCNC: 101 MG/DL — HIGH (ref 70–99)
GLUCOSE SERPL-MCNC: 110 MG/DL — HIGH (ref 70–99)
GLUCOSE SERPL-MCNC: 94 MG/DL — SIGNIFICANT CHANGE UP (ref 70–99)
GLUCOSE UR QL: NEGATIVE — SIGNIFICANT CHANGE UP
HBA1C BLD-MCNC: 5.7 % — HIGH (ref 4–5.6)
HCO3 BLDV-SCNC: 29 MMOL/L — SIGNIFICANT CHANGE UP (ref 21–29)
HCT VFR BLD CALC: 43.3 % — SIGNIFICANT CHANGE UP (ref 39–50)
HCT VFR BLD CALC: 44 % — SIGNIFICANT CHANGE UP (ref 39–50)
HCT VFR BLD CALC: 44.2 % — SIGNIFICANT CHANGE UP (ref 39–50)
HCT VFR BLD CALC: 45.5 % — SIGNIFICANT CHANGE UP (ref 39–50)
HCT VFR BLD CALC: 45.5 % — SIGNIFICANT CHANGE UP (ref 39–50)
HCT VFR BLD CALC: 45.9 % — SIGNIFICANT CHANGE UP (ref 39–50)
HCT VFR BLDA CALC: 47 % — SIGNIFICANT CHANGE UP (ref 39–50)
HCV AB S/CO SERPL IA: 0.15 S/CO — SIGNIFICANT CHANGE UP (ref 0–0.99)
HCV AB SERPL-IMP: SIGNIFICANT CHANGE UP
HDLC SERPL-MCNC: 38 MG/DL — LOW
HGB BLD CALC-MCNC: 15.4 G/DL — SIGNIFICANT CHANGE UP (ref 13–17)
HGB BLD-MCNC: 14.6 G/DL — SIGNIFICANT CHANGE UP (ref 13–17)
HGB BLD-MCNC: 14.7 G/DL — SIGNIFICANT CHANGE UP (ref 13–17)
HGB BLD-MCNC: 15.2 G/DL — SIGNIFICANT CHANGE UP (ref 13–17)
HGB BLD-MCNC: 15.4 G/DL — SIGNIFICANT CHANGE UP (ref 13–17)
HYALINE CASTS # UR AUTO: 2 /LPF — SIGNIFICANT CHANGE UP (ref 0–2)
INR BLD: 1.06 RATIO — SIGNIFICANT CHANGE UP (ref 0.88–1.16)
IRON SATN MFR SERPL: 16 % — SIGNIFICANT CHANGE UP (ref 16–55)
IRON SATN MFR SERPL: 43 UG/DL — LOW (ref 45–165)
KETONES UR-MCNC: NEGATIVE — SIGNIFICANT CHANGE UP
LACTATE BLDV-MCNC: 1.7 MMOL/L — SIGNIFICANT CHANGE UP (ref 0.7–2)
LEUKOCYTE ESTERASE UR-ACNC: NEGATIVE — SIGNIFICANT CHANGE UP
LIPID PNL WITH DIRECT LDL SERPL: 78 MG/DL — SIGNIFICANT CHANGE UP
LYMPHOCYTES # BLD AUTO: 1.8 K/UL — SIGNIFICANT CHANGE UP (ref 1–3.3)
LYMPHOCYTES # BLD AUTO: 16.2 % — SIGNIFICANT CHANGE UP (ref 13–44)
LYMPHOCYTES # BLD AUTO: 18.2 % — SIGNIFICANT CHANGE UP (ref 13–44)
LYMPHOCYTES # BLD AUTO: 19.7 % — SIGNIFICANT CHANGE UP (ref 13–44)
LYMPHOCYTES # BLD AUTO: 2.4 K/UL — SIGNIFICANT CHANGE UP (ref 1–3.3)
LYMPHOCYTES # BLD AUTO: 2.7 K/UL — SIGNIFICANT CHANGE UP (ref 1–3.3)
MACROCYTES BLD QL: SLIGHT — SIGNIFICANT CHANGE UP
MAGNESIUM SERPL-MCNC: 2.1 MG/DL — SIGNIFICANT CHANGE UP (ref 1.6–2.6)
MCHC RBC-ENTMCNC: 32 GM/DL — SIGNIFICANT CHANGE UP (ref 32–36)
MCHC RBC-ENTMCNC: 32.9 PG — SIGNIFICANT CHANGE UP (ref 27–34)
MCHC RBC-ENTMCNC: 33 GM/DL — SIGNIFICANT CHANGE UP (ref 32–36)
MCHC RBC-ENTMCNC: 33 PG — SIGNIFICANT CHANGE UP (ref 27–34)
MCHC RBC-ENTMCNC: 33.1 GM/DL — SIGNIFICANT CHANGE UP (ref 32–36)
MCHC RBC-ENTMCNC: 33.5 GM/DL — SIGNIFICANT CHANGE UP (ref 32–36)
MCHC RBC-ENTMCNC: 33.5 PG — SIGNIFICANT CHANGE UP (ref 27–34)
MCHC RBC-ENTMCNC: 33.5 PG — SIGNIFICANT CHANGE UP (ref 27–34)
MCHC RBC-ENTMCNC: 33.6 GM/DL — SIGNIFICANT CHANGE UP (ref 32–36)
MCHC RBC-ENTMCNC: 33.7 GM/DL — SIGNIFICANT CHANGE UP (ref 32–36)
MCHC RBC-ENTMCNC: 33.8 PG — SIGNIFICANT CHANGE UP (ref 27–34)
MCHC RBC-ENTMCNC: 34.1 PG — HIGH (ref 27–34)
MCV RBC AUTO: 100 FL — SIGNIFICANT CHANGE UP (ref 80–100)
MCV RBC AUTO: 101 FL — HIGH (ref 80–100)
MCV RBC AUTO: 102 FL — HIGH (ref 80–100)
MCV RBC AUTO: 102.9 FL — HIGH (ref 80–100)
MCV RBC AUTO: 99.5 FL — SIGNIFICANT CHANGE UP (ref 80–100)
MCV RBC AUTO: 99.6 FL — SIGNIFICANT CHANGE UP (ref 80–100)
MONOCYTES # BLD AUTO: 0.7 K/UL — SIGNIFICANT CHANGE UP (ref 0–0.9)
MONOCYTES # BLD AUTO: 0.9 K/UL — SIGNIFICANT CHANGE UP (ref 0–0.9)
MONOCYTES # BLD AUTO: 1.1 K/UL — HIGH (ref 0–0.9)
MONOCYTES NFR BLD AUTO: 6.5 % — SIGNIFICANT CHANGE UP (ref 2–14)
MONOCYTES NFR BLD AUTO: 7.2 % — SIGNIFICANT CHANGE UP (ref 2–14)
MONOCYTES NFR BLD AUTO: 7.3 % — SIGNIFICANT CHANGE UP (ref 2–14)
NEUTROPHILS # BLD AUTO: 11.4 K/UL — HIGH (ref 1.8–7.4)
NEUTROPHILS # BLD AUTO: 7.2 K/UL — SIGNIFICANT CHANGE UP (ref 1.8–7.4)
NEUTROPHILS # BLD AUTO: 9.9 K/UL — HIGH (ref 1.8–7.4)
NEUTROPHILS NFR BLD AUTO: 71.9 % — SIGNIFICANT CHANGE UP (ref 43–77)
NEUTROPHILS NFR BLD AUTO: 72.4 % — SIGNIFICANT CHANGE UP (ref 43–77)
NEUTROPHILS NFR BLD AUTO: 76 % — SIGNIFICANT CHANGE UP (ref 43–77)
NITRITE UR-MCNC: NEGATIVE — SIGNIFICANT CHANGE UP
OB PNL STL: POSITIVE
PCO2 BLDV: 47 MMHG — SIGNIFICANT CHANGE UP (ref 35–50)
PH BLDV: 7.41 — SIGNIFICANT CHANGE UP (ref 7.35–7.45)
PH UR: 6.5 — SIGNIFICANT CHANGE UP (ref 5–8)
PHOSPHATE SERPL-MCNC: 3 MG/DL — SIGNIFICANT CHANGE UP (ref 2.5–4.5)
PLAT MORPH BLD: NORMAL — SIGNIFICANT CHANGE UP
PLATELET # BLD AUTO: 143 K/UL — LOW (ref 150–400)
PLATELET # BLD AUTO: 151 K/UL — SIGNIFICANT CHANGE UP (ref 150–400)
PLATELET # BLD AUTO: 152 K/UL — SIGNIFICANT CHANGE UP (ref 150–400)
PLATELET # BLD AUTO: 157 K/UL — SIGNIFICANT CHANGE UP (ref 150–400)
PLATELET # BLD AUTO: 161 K/UL — SIGNIFICANT CHANGE UP (ref 150–400)
PLATELET # BLD AUTO: 176 K/UL — SIGNIFICANT CHANGE UP (ref 150–400)
PO2 BLDV: 27 MMHG — SIGNIFICANT CHANGE UP (ref 25–45)
POLYCHROMASIA BLD QL SMEAR: SLIGHT — SIGNIFICANT CHANGE UP
POTASSIUM BLDV-SCNC: 3.8 MMOL/L — SIGNIFICANT CHANGE UP (ref 3.5–5.3)
POTASSIUM SERPL-MCNC: 3.8 MMOL/L — SIGNIFICANT CHANGE UP (ref 3.5–5.3)
POTASSIUM SERPL-MCNC: 3.8 MMOL/L — SIGNIFICANT CHANGE UP (ref 3.5–5.3)
POTASSIUM SERPL-MCNC: 4.6 MMOL/L — SIGNIFICANT CHANGE UP (ref 3.5–5.3)
POTASSIUM SERPL-SCNC: 3.8 MMOL/L — SIGNIFICANT CHANGE UP (ref 3.5–5.3)
POTASSIUM SERPL-SCNC: 3.8 MMOL/L — SIGNIFICANT CHANGE UP (ref 3.5–5.3)
POTASSIUM SERPL-SCNC: 4.6 MMOL/L — SIGNIFICANT CHANGE UP (ref 3.5–5.3)
PROT SERPL-MCNC: 6.5 G/DL — SIGNIFICANT CHANGE UP (ref 6–8.3)
PROT SERPL-MCNC: 7.1 G/DL — SIGNIFICANT CHANGE UP (ref 6–8.3)
PROT UR-MCNC: ABNORMAL
PROTHROM AB SERPL-ACNC: 12.2 SEC — SIGNIFICANT CHANGE UP (ref 10–12.9)
RBC # BLD: 4.35 M/UL — SIGNIFICANT CHANGE UP (ref 4.2–5.8)
RBC # BLD: 4.36 M/UL — SIGNIFICANT CHANGE UP (ref 4.2–5.8)
RBC # BLD: 4.44 M/UL — SIGNIFICANT CHANGE UP (ref 4.2–5.8)
RBC # BLD: 4.46 M/UL — SIGNIFICANT CHANGE UP (ref 4.2–5.8)
RBC # BLD: 4.47 M/UL — SIGNIFICANT CHANGE UP (ref 4.2–5.8)
RBC # BLD: 4.54 M/UL — SIGNIFICANT CHANGE UP (ref 4.2–5.8)
RBC # FLD: 12.7 % — SIGNIFICANT CHANGE UP (ref 10.3–14.5)
RBC # FLD: 12.8 % — SIGNIFICANT CHANGE UP (ref 10.3–14.5)
RBC # FLD: 13 % — SIGNIFICANT CHANGE UP (ref 10.3–14.5)
RBC # FLD: 13.6 % — SIGNIFICANT CHANGE UP (ref 10.3–14.5)
RBC BLD AUTO: SIGNIFICANT CHANGE UP
RBC CASTS # UR COMP ASSIST: 3 /HPF — SIGNIFICANT CHANGE UP (ref 0–4)
RH IG SCN BLD-IMP: NEGATIVE — SIGNIFICANT CHANGE UP
SAO2 % BLDV: 42 % — LOW (ref 67–88)
SODIUM SERPL-SCNC: 137 MMOL/L — SIGNIFICANT CHANGE UP (ref 135–145)
SODIUM SERPL-SCNC: 139 MMOL/L — SIGNIFICANT CHANGE UP (ref 135–145)
SODIUM SERPL-SCNC: 140 MMOL/L — SIGNIFICANT CHANGE UP (ref 135–145)
SP GR SPEC: >1.05 (ref 1.01–1.02)
SPECIMEN SOURCE: SIGNIFICANT CHANGE UP
SURGICAL PATHOLOGY STUDY: SIGNIFICANT CHANGE UP
TIBC SERPL-MCNC: 268 UG/DL — SIGNIFICANT CHANGE UP (ref 220–430)
TOTAL CHOLESTEROL/HDL RATIO MEASUREMENT: 3.5 RATIO — SIGNIFICANT CHANGE UP (ref 3.4–9.6)
TRIGL SERPL-MCNC: 88 MG/DL — SIGNIFICANT CHANGE UP (ref 10–149)
TSH SERPL-MCNC: 1.54 UIU/ML — SIGNIFICANT CHANGE UP (ref 0.27–4.2)
UIBC SERPL-MCNC: 225 UG/DL — SIGNIFICANT CHANGE UP (ref 110–370)
UROBILINOGEN FLD QL: ABNORMAL
VIT B12 SERPL-MCNC: 657 PG/ML — SIGNIFICANT CHANGE UP (ref 232–1245)
WBC # BLD: 10.3 K/UL — SIGNIFICANT CHANGE UP (ref 3.8–10.5)
WBC # BLD: 12.2 K/UL — HIGH (ref 3.8–10.5)
WBC # BLD: 13.7 K/UL — HIGH (ref 3.8–10.5)
WBC # BLD: 15 K/UL — HIGH (ref 3.8–10.5)
WBC # BLD: 8.6 K/UL — SIGNIFICANT CHANGE UP (ref 3.8–10.5)
WBC # BLD: 9.9 K/UL — SIGNIFICANT CHANGE UP (ref 3.8–10.5)
WBC # FLD AUTO: 10.3 K/UL — SIGNIFICANT CHANGE UP (ref 3.8–10.5)
WBC # FLD AUTO: 12.2 K/UL — HIGH (ref 3.8–10.5)
WBC # FLD AUTO: 13.7 K/UL — HIGH (ref 3.8–10.5)
WBC # FLD AUTO: 15 K/UL — HIGH (ref 3.8–10.5)
WBC # FLD AUTO: 8.6 K/UL — SIGNIFICANT CHANGE UP (ref 3.8–10.5)
WBC # FLD AUTO: 9.9 K/UL — SIGNIFICANT CHANGE UP (ref 3.8–10.5)
WBC UR QL: 3 /HPF — SIGNIFICANT CHANGE UP (ref 0–5)

## 2019-01-01 PROCEDURE — 83735 ASSAY OF MAGNESIUM: CPT

## 2019-01-01 PROCEDURE — 86803 HEPATITIS C AB TEST: CPT

## 2019-01-01 PROCEDURE — 84443 ASSAY THYROID STIM HORMONE: CPT

## 2019-01-01 PROCEDURE — 84132 ASSAY OF SERUM POTASSIUM: CPT

## 2019-01-01 PROCEDURE — 83540 ASSAY OF IRON: CPT

## 2019-01-01 PROCEDURE — 80048 BASIC METABOLIC PNL TOTAL CA: CPT

## 2019-01-01 PROCEDURE — 82947 ASSAY GLUCOSE BLOOD QUANT: CPT

## 2019-01-01 PROCEDURE — 80053 COMPREHEN METABOLIC PANEL: CPT

## 2019-01-01 PROCEDURE — 74178 CT ABD&PLV WO CNTR FLWD CNTR: CPT | Mod: 26

## 2019-01-01 PROCEDURE — 99285 EMERGENCY DEPT VISIT HI MDM: CPT | Mod: 25

## 2019-01-01 PROCEDURE — 82728 ASSAY OF FERRITIN: CPT

## 2019-01-01 PROCEDURE — 99285 EMERGENCY DEPT VISIT HI MDM: CPT | Mod: GC

## 2019-01-01 PROCEDURE — 99239 HOSP IP/OBS DSCHRG MGMT >30: CPT

## 2019-01-01 PROCEDURE — 45385 COLONOSCOPY W/LESION REMOVAL: CPT | Mod: GC

## 2019-01-01 PROCEDURE — 82330 ASSAY OF CALCIUM: CPT

## 2019-01-01 PROCEDURE — 82272 OCCULT BLD FECES 1-3 TESTS: CPT

## 2019-01-01 PROCEDURE — 88305 TISSUE EXAM BY PATHOLOGIST: CPT

## 2019-01-01 PROCEDURE — 96374 THER/PROPH/DIAG INJ IV PUSH: CPT | Mod: XU

## 2019-01-01 PROCEDURE — 83036 HEMOGLOBIN GLYCOSYLATED A1C: CPT

## 2019-01-01 PROCEDURE — 88305 TISSUE EXAM BY PATHOLOGIST: CPT | Mod: 26

## 2019-01-01 PROCEDURE — 74178 CT ABD&PLV WO CNTR FLWD CNTR: CPT

## 2019-01-01 PROCEDURE — 83550 IRON BINDING TEST: CPT

## 2019-01-01 PROCEDURE — 81001 URINALYSIS AUTO W/SCOPE: CPT

## 2019-01-01 PROCEDURE — 99232 SBSQ HOSP IP/OBS MODERATE 35: CPT | Mod: GC

## 2019-01-01 PROCEDURE — 82803 BLOOD GASES ANY COMBINATION: CPT

## 2019-01-01 PROCEDURE — 85014 HEMATOCRIT: CPT

## 2019-01-01 PROCEDURE — 86850 RBC ANTIBODY SCREEN: CPT

## 2019-01-01 PROCEDURE — 99233 SBSQ HOSP IP/OBS HIGH 50: CPT

## 2019-01-01 PROCEDURE — 85610 PROTHROMBIN TIME: CPT

## 2019-01-01 PROCEDURE — 87507 IADNA-DNA/RNA PROBE TQ 12-25: CPT

## 2019-01-01 PROCEDURE — 99223 1ST HOSP IP/OBS HIGH 75: CPT

## 2019-01-01 PROCEDURE — 80061 LIPID PANEL: CPT

## 2019-01-01 PROCEDURE — 86901 BLOOD TYPING SEROLOGIC RH(D): CPT

## 2019-01-01 PROCEDURE — 82435 ASSAY OF BLOOD CHLORIDE: CPT

## 2019-01-01 PROCEDURE — 85027 COMPLETE CBC AUTOMATED: CPT

## 2019-01-01 PROCEDURE — 94660 CPAP INITIATION&MGMT: CPT

## 2019-01-01 PROCEDURE — 93306 TTE W/DOPPLER COMPLETE: CPT | Mod: 26

## 2019-01-01 PROCEDURE — 87040 BLOOD CULTURE FOR BACTERIA: CPT

## 2019-01-01 PROCEDURE — 83605 ASSAY OF LACTIC ACID: CPT

## 2019-01-01 PROCEDURE — 84100 ASSAY OF PHOSPHORUS: CPT

## 2019-01-01 PROCEDURE — 84295 ASSAY OF SERUM SODIUM: CPT

## 2019-01-01 PROCEDURE — 99222 1ST HOSP IP/OBS MODERATE 55: CPT | Mod: GC

## 2019-01-01 PROCEDURE — 93306 TTE W/DOPPLER COMPLETE: CPT

## 2019-01-01 PROCEDURE — 85730 THROMBOPLASTIN TIME PARTIAL: CPT

## 2019-01-01 PROCEDURE — 82607 VITAMIN B-12: CPT

## 2019-01-01 PROCEDURE — 86900 BLOOD TYPING SEROLOGIC ABO: CPT

## 2019-01-01 PROCEDURE — 82746 ASSAY OF FOLIC ACID SERUM: CPT

## 2019-01-01 RX ORDER — SOD SULF/SODIUM/NAHCO3/KCL/PEG
1000 SOLUTION, RECONSTITUTED, ORAL ORAL EVERY 4 HOURS
Qty: 0 | Refills: 0 | Status: COMPLETED | OUTPATIENT
Start: 2019-01-01 | End: 2019-01-01

## 2019-01-01 RX ORDER — CIPROFLOXACIN LACTATE 400MG/40ML
VIAL (ML) INTRAVENOUS
Qty: 0 | Refills: 0 | Status: DISCONTINUED | OUTPATIENT
Start: 2019-01-01 | End: 2019-01-01

## 2019-01-01 RX ORDER — METRONIDAZOLE 500 MG
1 TABLET ORAL
Qty: 36 | Refills: 0
Start: 2019-01-01 | End: 2019-01-01

## 2019-01-01 RX ORDER — METRONIDAZOLE 500 MG
500 TABLET ORAL EVERY 8 HOURS
Qty: 0 | Refills: 0 | Status: DISCONTINUED | OUTPATIENT
Start: 2019-01-01 | End: 2019-01-01

## 2019-01-01 RX ORDER — CIPROFLOXACIN LACTATE 400MG/40ML
1 VIAL (ML) INTRAVENOUS
Qty: 24 | Refills: 0
Start: 2019-01-01 | End: 2019-01-01

## 2019-01-01 RX ORDER — CIPROFLOXACIN LACTATE 400MG/40ML
400 VIAL (ML) INTRAVENOUS EVERY 12 HOURS
Qty: 0 | Refills: 0 | Status: DISCONTINUED | OUTPATIENT
Start: 2019-01-01 | End: 2019-01-01

## 2019-01-01 RX ORDER — PIPERACILLIN AND TAZOBACTAM 4; .5 G/20ML; G/20ML
3.38 INJECTION, POWDER, LYOPHILIZED, FOR SOLUTION INTRAVENOUS ONCE
Qty: 0 | Refills: 0 | Status: COMPLETED | OUTPATIENT
Start: 2019-01-01 | End: 2019-01-01

## 2019-01-01 RX ORDER — LOSARTAN POTASSIUM 100 MG/1
1 TABLET, FILM COATED ORAL
Qty: 0 | Refills: 0 | DISCHARGE
Start: 2019-01-01

## 2019-01-01 RX ORDER — METRONIDAZOLE 500 MG
500 TABLET ORAL ONCE
Qty: 0 | Refills: 0 | Status: COMPLETED | OUTPATIENT
Start: 2019-01-01 | End: 2019-01-01

## 2019-01-01 RX ORDER — PANTOPRAZOLE SODIUM 20 MG/1
1 TABLET, DELAYED RELEASE ORAL
Qty: 14 | Refills: 0
Start: 2019-01-01 | End: 2019-01-01

## 2019-01-01 RX ORDER — PANTOPRAZOLE SODIUM 20 MG/1
40 TABLET, DELAYED RELEASE ORAL
Qty: 0 | Refills: 0 | Status: DISCONTINUED | OUTPATIENT
Start: 2019-01-01 | End: 2019-01-01

## 2019-01-01 RX ORDER — DILTIAZEM HCL 120 MG
180 CAPSULE, EXT RELEASE 24 HR ORAL DAILY
Qty: 0 | Refills: 0 | Status: DISCONTINUED | OUTPATIENT
Start: 2019-01-01 | End: 2019-01-01

## 2019-01-01 RX ORDER — HYDROCHLOROTHIAZIDE 25 MG
12.5 TABLET ORAL DAILY
Qty: 0 | Refills: 0 | Status: DISCONTINUED | OUTPATIENT
Start: 2019-01-01 | End: 2019-01-01

## 2019-01-01 RX ORDER — METRONIDAZOLE 500 MG
TABLET ORAL
Qty: 0 | Refills: 0 | Status: DISCONTINUED | OUTPATIENT
Start: 2019-01-01 | End: 2019-01-01

## 2019-01-01 RX ORDER — TAMSULOSIN HYDROCHLORIDE 0.4 MG/1
0.4 CAPSULE ORAL AT BEDTIME
Qty: 0 | Refills: 0 | Status: DISCONTINUED | OUTPATIENT
Start: 2019-01-01 | End: 2019-01-01

## 2019-01-01 RX ORDER — CIPROFLOXACIN LACTATE 400MG/40ML
400 VIAL (ML) INTRAVENOUS ONCE
Qty: 0 | Refills: 0 | Status: COMPLETED | OUTPATIENT
Start: 2019-01-01 | End: 2019-01-01

## 2019-01-01 RX ORDER — ALBUTEROL 90 UG/1
2 AEROSOL, METERED ORAL EVERY 6 HOURS
Qty: 0 | Refills: 0 | Status: DISCONTINUED | OUTPATIENT
Start: 2019-01-01 | End: 2019-01-01

## 2019-01-01 RX ORDER — LOSARTAN POTASSIUM 100 MG/1
50 TABLET, FILM COATED ORAL DAILY
Qty: 0 | Refills: 0 | Status: DISCONTINUED | OUTPATIENT
Start: 2019-01-01 | End: 2019-01-01

## 2019-01-01 RX ORDER — ATORVASTATIN CALCIUM 80 MG/1
80 TABLET, FILM COATED ORAL AT BEDTIME
Qty: 0 | Refills: 0 | Status: DISCONTINUED | OUTPATIENT
Start: 2019-01-01 | End: 2019-01-01

## 2019-01-01 RX ADMIN — Medication 12.5 MILLIGRAM(S): at 12:15

## 2019-01-01 RX ADMIN — Medication 12.5 MILLIGRAM(S): at 05:46

## 2019-01-01 RX ADMIN — Medication 100 MILLIGRAM(S): at 20:15

## 2019-01-01 RX ADMIN — LOSARTAN POTASSIUM 50 MILLIGRAM(S): 100 TABLET, FILM COATED ORAL at 05:46

## 2019-01-01 RX ADMIN — Medication 180 MILLIGRAM(S): at 17:42

## 2019-01-01 RX ADMIN — PIPERACILLIN AND TAZOBACTAM 3.38 GRAM(S): 4; .5 INJECTION, POWDER, LYOPHILIZED, FOR SOLUTION INTRAVENOUS at 23:29

## 2019-01-01 RX ADMIN — PANTOPRAZOLE SODIUM 40 MILLIGRAM(S): 20 TABLET, DELAYED RELEASE ORAL at 05:44

## 2019-01-01 RX ADMIN — Medication 100 MILLIGRAM(S): at 12:14

## 2019-01-01 RX ADMIN — Medication 100 MILLIGRAM(S): at 14:09

## 2019-01-01 RX ADMIN — PANTOPRAZOLE SODIUM 40 MILLIGRAM(S): 20 TABLET, DELAYED RELEASE ORAL at 05:34

## 2019-01-01 RX ADMIN — Medication 100 MILLIGRAM(S): at 05:48

## 2019-01-01 RX ADMIN — Medication 200 MILLIGRAM(S): at 10:43

## 2019-01-01 RX ADMIN — PANTOPRAZOLE SODIUM 40 MILLIGRAM(S): 20 TABLET, DELAYED RELEASE ORAL at 17:42

## 2019-01-01 RX ADMIN — Medication 1000 MILLILITER(S): at 18:50

## 2019-01-01 RX ADMIN — Medication 200 MILLIGRAM(S): at 21:53

## 2019-01-01 RX ADMIN — Medication 1000 MILLILITER(S): at 21:42

## 2019-01-01 RX ADMIN — Medication 12.5 MILLIGRAM(S): at 05:34

## 2019-01-01 RX ADMIN — PIPERACILLIN AND TAZOBACTAM 200 GRAM(S): 4; .5 INJECTION, POWDER, LYOPHILIZED, FOR SOLUTION INTRAVENOUS at 23:29

## 2019-01-01 RX ADMIN — Medication 180 MILLIGRAM(S): at 05:34

## 2019-01-01 RX ADMIN — ATORVASTATIN CALCIUM 80 MILLIGRAM(S): 80 TABLET, FILM COATED ORAL at 21:53

## 2019-01-01 RX ADMIN — TAMSULOSIN HYDROCHLORIDE 0.4 MILLIGRAM(S): 0.4 CAPSULE ORAL at 21:53

## 2019-01-01 RX ADMIN — LOSARTAN POTASSIUM 50 MILLIGRAM(S): 100 TABLET, FILM COATED ORAL at 12:15

## 2019-01-01 RX ADMIN — ATORVASTATIN CALCIUM 80 MILLIGRAM(S): 80 TABLET, FILM COATED ORAL at 21:28

## 2019-01-01 RX ADMIN — Medication 200 MILLIGRAM(S): at 01:08

## 2019-01-01 RX ADMIN — Medication 100 MILLIGRAM(S): at 21:29

## 2019-01-01 RX ADMIN — Medication 180 MILLIGRAM(S): at 05:45

## 2019-01-01 RX ADMIN — Medication 100 MILLIGRAM(S): at 05:34

## 2019-01-01 RX ADMIN — LOSARTAN POTASSIUM 50 MILLIGRAM(S): 100 TABLET, FILM COATED ORAL at 05:34

## 2019-01-01 RX ADMIN — Medication 200 MILLIGRAM(S): at 10:34

## 2019-01-01 RX ADMIN — TAMSULOSIN HYDROCHLORIDE 0.4 MILLIGRAM(S): 0.4 CAPSULE ORAL at 21:29

## 2019-01-01 RX ADMIN — PANTOPRAZOLE SODIUM 40 MILLIGRAM(S): 20 TABLET, DELAYED RELEASE ORAL at 18:49

## 2019-02-05 ENCOUNTER — APPOINTMENT (OUTPATIENT)
Dept: INTERNAL MEDICINE | Facility: CLINIC | Age: 69
End: 2019-02-05
Payer: COMMERCIAL

## 2019-02-05 PROBLEM — G47.33 OBSTRUCTIVE SLEEP APNEA (ADULT) (PEDIATRIC): Chronic | Status: ACTIVE | Noted: 2017-04-17

## 2019-02-05 PROCEDURE — 99397 PER PM REEVAL EST PAT 65+ YR: CPT

## 2019-02-06 ENCOUNTER — RESULT CHARGE (OUTPATIENT)
Age: 69
End: 2019-02-06

## 2019-02-06 LAB
GLUCOSE UR-MCNC: 0
HGB UR QL STRIP.AUTO: 0
PROT UR STRIP-MCNC: NORMAL
SP GR UR STRIP: 1.01

## 2019-04-08 NOTE — ED ADULT NURSE NOTE - OBJECTIVE STATEMENT
68 YO M arrived to the ed of aortic aneurysm s/p repair x 2 (endovascular), COPD, Coronary Artery Disease, HTN, hyperlipidemia; arrived to the ed c/o BRBPR x 2 days; also reports near-syncope 2 days ago; pt saw his GI today and told to come to the ed; Last colonoscopy 14 years ago, showed polyps but no h/o diverticulosis/diverticulitis. Last bloody bowel movement this AM; family @ bedside, iv placed via qdoc rn

## 2019-04-08 NOTE — ED PROVIDER NOTE - OBJECTIVE STATEMENT
68yo male PMH aortic aneurysm s/p repair x 2 (endovascular), COPD, Coronary Artery Disease, HTN, hyperlipidemia, presenting with bright red blood per rectum x 2 days. Patient states that 2 days ago, he developed near-syncope, and noticed that he was passing bright red blood with clots and gas, >10 episodes per day. patient saw his gastroenterologist today, Dr. Espinal, who told him to go to ED for evaluation and admission. Last colonoscopy 14 years ago, showed polyps but no h/o diverticulosis/diverticulitis. Last bloody bowel movement this AM.     PMD: Dr. April Israel  GI: Dr. Espinal  Vascular: Dr. Woods

## 2019-04-08 NOTE — ED STATDOCS - OBJECTIVE STATEMENT
68 y/o male with pmhx of AAA (surgery 04/2017), hernia, HTN, HLD, COPD, and CAD  c/o rectal bleeding with clots since yesterday. +abd pain. Pt would have bleeding when passing gas. Bleeding has currently stopped. Denies diarrhea. Currently taking Aspirin 81. Denies hx of colitis or diverticulitis.     **pt seen in waiting room for ED triage - comprehensive history and physical is not performed by me - patient to be sent to main ED for full history / physical and medical evaluation - all orders placed by me to be followed by MD in main ED**

## 2019-04-08 NOTE — ED PROVIDER NOTE - PROGRESS NOTE DETAILS
Patient seen by surgery, no acute surgical intervention at this time. Will admit to medicine for management of colitis/GIB. Susan العلي DO

## 2019-04-08 NOTE — ED PROVIDER NOTE - PMH
AAA (abdominal aortic aneurysm)  dx: 10/2012  CAD (coronary artery disease)    Current smoker  1ppd X 40 years  Heart murmur    Herniated lumbar intervertebral disc  surgically treated ' 90's.    dx: 10/2012: recurrent herniated Lumbar Discs. Furthur workup to be done after AAA repair  HTN (hypertension)    Hyperlipidemia    Inguinal hernia  RIH ' 2007 surgically treated  JENSEN (obstructive sleep apnea)    Right bundle branch block    Umbilical hernia  5/2012 surgically treated

## 2019-04-08 NOTE — ED PROVIDER NOTE - PHYSICAL EXAMINATION
Gen: NAD  Head: NCAT  Lung: CTAB, no respiratory distress, no wheezing, rales, rhonchi  CV: normal s1/s2, rrr, no murmurs, Normal perfusion  Abd: soft, +TTP LLQ, no guarding/rigidity, rectal exam brown stool, no hemorrhoids  MSK: No edema, no visible deformities, full range of motion in all 4 extremities  Neuro: No focal neurologic deficits  Skin: No rash   Psych: normal affect

## 2019-04-08 NOTE — ED STATDOCS - PROGRESS NOTE DETAILS
Attending MD Meyers: This patient was seen and orders were placed by the PA.  I was present in the Emergency Department and available to the PA during the time this patient was seen and evaluated.  Although I was available for any questions or concerns, I was not consulted on or asked to evaluate this patient.  I did not perform a face to face diagnostic evaluation nor did I discuss the history, exam and plan of care with the PA.

## 2019-04-08 NOTE — ED PROVIDER NOTE - ATTENDING CONTRIBUTION TO CARE
attending Pollack: 69yM h/o AAA s/p repair x 2 (endovascular), COPD, CAD, HTN, HLD presents from GI office for episodes of BRBPR x 2 days. Assoc with abdominal pain. +lightheadedness with near syncope. Denies hematemesis. Reports >10 episodes per day. Abdomen obese, tender in LLQ, rectal nontender with brown stool. 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

## 2019-04-09 NOTE — CONSULT NOTE ADULT - SUBJECTIVE AND OBJECTIVE BOX
Vascular Surgery Consult  Consulting surgical team: C-Team  Consulting attending: Dr. Ramirez    HPI: Patient is a 69M Hx AAA s/p endovascular repair 1/3/13 c/b right pseudoaneursym s/p repair '13 and a Type 1A endoleak s/p repair 11/16/17 presenting with a GI bleed. He developed a GI bleed over the past two days. He stats that initially the blood was dark but became progressively bright. He had a colonoscopy about 12 years ago. Patient underwent CT scan which showed colitis and an enlarged AAA (8.2x7.8cm from 7.3x7.9 over 1 year) and a possible Type II endoleak. PAtient has been afebrile but endorses abdominal pain. No n/v/cp/sob.       PAST MEDICAL HISTORY:  JENSEN (obstructive sleep apnea)  HTN (hypertension)  Current smoker  Umbilical hernia  Inguinal hernia  Herniated lumbar intervertebral disc  Hyperlipidemia  Heart murmur  Right bundle branch block  CAD (coronary artery disease)  AAA (abdominal aortic aneurysm)      PAST SURGICAL HISTORY:  S/P laminectomy  S/P hernia repair  S/P hernia repair  S/P tonsillectomy      MEDICATIONS:      ALLERGIES:  No Known Allergies      VITALS & I/Os:  Vital Signs Last 24 Hrs  T(C): 36.7 (09 Apr 2019 00:39), Max: 36.9 (08 Apr 2019 21:56)  T(F): 98.1 (09 Apr 2019 00:39), Max: 98.4 (08 Apr 2019 21:56)  HR: 68 (09 Apr 2019 00:39) (68 - 74)  BP: 144/69 (09 Apr 2019 00:39) (144/69 - 170/85)  BP(mean): --  RR: 18 (09 Apr 2019 00:39) (18 - 18)  SpO2: 94% (09 Apr 2019 00:39) (94% - 95%)    I&O's Summary      PHYSICAL EXAM:  General: No acute distress  Respiratory: Nonlabored  Cardiovascular: RRR  Abdominal: Soft, mildly distended, mild tenderness in epigastrum to palpation.   Extremities: Warm    LABS:                        14.6   13.7  )-----------( 143      ( 08 Apr 2019 23:21 )             44.2     04-08    137  |  100  |  17  ----------------------------<  94  4.6   |  22  |  1.04    Ca    9.2      08 Apr 2019 19:37    TPro  7.1  /  Alb  3.8  /  TBili  1.0  /  DBili  x   /  AST  34  /  ALT  55<H>  /  AlkPhos  87  04-08    Lactate:  04-08 @ 19:37  1.7    PT/INR - ( 08 Apr 2019 19:37 )   PT: 12.2 sec;   INR: 1.06 ratio         PTT - ( 08 Apr 2019 19:37 )  PTT:26.2 sec          Urinalysis Basic - ( 08 Apr 2019 23:37 )    Color: Yellow / Appearance: Clear / SG: >1.050 / pH: x  Gluc: x / Ketone: Negative  / Bili: Negative / Urobili: 2 mg/dL   Blood: x / Protein: 30 mg/dL / Nitrite: Negative   Leuk Esterase: Negative / RBC: 3 /hpf / WBC 3 /HPF   Sq Epi: x / Non Sq Epi: 1 /hpf / Bacteria: Negative        IMAGING:    < from: CT Abdomen and Pelvis w/wo IV Cont (04.08.19 @ 21:10) >  IMPRESSION:   No evidence of gastrointestinal hemorrhage.    Ascending and proximal transverse colon colitis.    Status post endovascular aortic aneurysm repair, the aneurysm measures   8.2 x 7.8 cm, increasedin size in comparison to prior study dating   5/22/2018. There is faint high density material within the aneurysm sac   on arterial phase imaging which may represent a type II endoleak.      < end of copied text >  < from: CT Abdomen and Pelvis w/wo IV Cont (04.08.19 @ 21:10) >  IMPRESSION:   No evidence of gastrointestinal hemorrhage.    Ascending and proximal transverse colon colitis.    Status post endovascular aortic aneurysm repair, the aneurysm measures   8.2 x 7.8 cm, increasedin size in comparison to prior study dating   5/22/2018. There is faint high density material within the aneurysm sac   on arterial phase imaging which may represent a type II endoleak.      < end of copied text >

## 2019-04-09 NOTE — CONSULT NOTE ADULT - ATTENDING COMMENTS
Bleeding, abnormal CT, resolving pain  Strong suspicion for ischemic colitis  Can not rule out CA  Colonoscopy

## 2019-04-09 NOTE — H&P ADULT - ASSESSMENT
69 year old male with Hx AAA s/p endovascular repair 1/3/13 c/b right pseudoaneursym s/p repair '13 and a Type 1A endoleak s/p repair 11/16/17, COPD, JENSEN on CPAP, HTN, HLD, CAD presents with episodes of hematochezia over the past 2 days found to have GI bleed and colitis

## 2019-04-09 NOTE — CONSULT NOTE ADULT - ASSESSMENT
Patient is a 69M Hx AAA s/p endovascular repair 1/3/13 c/b right pseudoaneursym s/p repair '13 and a Type 1A endoleak s/p repair 11/16/17 presenting with a GI bleed.    Patient had a CT scan that showed an enlarged AAA with the possibility of a Type II endoleak.     CT scan from 5/22/18 showed AAA 7.3x7.9cm  CT scan today shows 8.2x7.8cm    - Patient will be admitted for work-up of GI bleed  - Once acute process resolves, patient will be evaluated for further intervention if needed  - Please page with questions  - Vascular surgery    [id number]

## 2019-04-09 NOTE — H&P ADULT - PROBLEM SELECTOR PLAN 3
Per vascular recs, pt requires an angiogram  and  possible  coiling of the  Lumbar  artery   No acute surgical intervention can follow up as outpt

## 2019-04-09 NOTE — H&P ADULT - PROBLEM SELECTOR PLAN 2
Pt with ascending and proximal transverse colon colitis on imaging   C/w cipro and flagyl   Monitor wbc and temp curve  Clears diet advance as tolerated

## 2019-04-09 NOTE — PATIENT PROFILE ADULT - NSPROGENDIFFINTUB_GEN_A_NUR
[No falls in past year] : Patient reported no falls in the past year [0] : 2) Feeling down, depressed, or hopeless: Not at all (0) [] : No previously intubated - problems

## 2019-04-09 NOTE — H&P ADULT - NSICDXPASTMEDICALHX_GEN_ALL_CORE_FT
PAST MEDICAL HISTORY:  AAA (abdominal aortic aneurysm) dx: 10/2012    CAD (coronary artery disease)     Current smoker 1ppd X 40 years    Heart murmur     Herniated lumbar intervertebral disc surgically treated ' 90's.    dx: 10/2012: recurrent herniated Lumbar Discs. Furthur workup to be done after AAA repair    HTN (hypertension)     Hyperlipidemia     Inguinal hernia RIH   ' 2007 surgically treated    JENSEN (obstructive sleep apnea)     Right bundle branch block     Umbilical hernia 5/2012 surgically treated

## 2019-04-09 NOTE — ED ADULT NURSE REASSESSMENT NOTE - NS ED NURSE REASSESS COMMENT FT1
Report received from MARIUM HORAN in red. Patient appears to be resting comfortably in stretcher. Patient denies any pain, dizziness, n/v/d, numbness, tingling, SOB. Patient denies any bloody bowel movements in ED. Patient noted to have SPO2 92%. Per patient states has COPD and that is baseline. ED MD Sandoval and ED MD العلي made aware. No acute respiratory distress noted. No accessory muscle use noted. A&OX3. Safety and comfort measures provided.

## 2019-04-09 NOTE — CONSULT NOTE ADULT - SUBJECTIVE AND OBJECTIVE BOX
Chief Complaint:  Patient is a 69y old  Male who presents with a chief complaint of GI bleed (09 Apr 2019 10:10)    HPI:  69 year old male with pmhx of AAA s/p endovascular repair (2013) complicated by R pseudoaneurysm s/p repair and a Type 1A endoleak s/p repair (2017), COPD, JENSEN on CPAP, Hypertension, hyperlipidemia and CAD presents with BRBPR x 2 days. Patient reports his symptoms began 3 days ago with BRBPR whenever he past gas. This occurred roughly 8 times, with the last episode being 2 days ago. The bleeding was initially bright red, but became darker with subsequent episodes. During these episodes his stool remained normal color. He went to see his outpatient GI, Dr. Espinal, who advised him to come to the ED given his history of AAA repair. He reports some abdominal discomfort during the onset of symptoms, which has since resolved. He denies fevers, chills, chest pain, headache, dizziness, nausea, vomiting, diarrhea, constipation, palpitations, melena, hematemesis and current abdominal pain. Reports his last colonoscopy was ~ 14 years ago.     Allergies:  No Known Allergies    Home Medications:  ASA  Atorvastatin  ProAir  Anoro Ellipta  Diltiazem  Flomax  Zetia  Omeprazole  Irbesartan-HCTZ    Hospital Medications:  ALBUTerol    90 MICROgram(s) HFA Inhaler 2 Puff(s) Inhalation every 6 hours PRN  atorvastatin 80 milliGRAM(s) Oral at bedtime  ciprofloxacin   IVPB      ciprofloxacin   IVPB 400 milliGRAM(s) IV Intermittent every 12 hours  diltiazem    milliGRAM(s) Oral daily  hydrochlorothiazide 12.5 milliGRAM(s) Oral daily  losartan 50 milliGRAM(s) Oral daily  metroNIDAZOLE  IVPB      metroNIDAZOLE  IVPB 500 milliGRAM(s) IV Intermittent every 8 hours  pantoprazole  Injectable 40 milliGRAM(s) IV Push two times a day  tamsulosin 0.4 milliGRAM(s) Oral at bedtime    PMHX/PSHX:  JENSEN (obstructive sleep apnea)  HTN (hypertension)  Current smoker  Umbilical hernia  Inguinal hernia  Herniated lumbar intervertebral disc  Hyperlipidemia  Heart murmur  Right bundle branch block  CAD (coronary artery disease)  AAA (abdominal aortic aneurysm)  S/P laminectomy  S/P hernia repair  S/P hernia repair  S/P tonsillectomy    Family history:  No pertinent family history in first degree relatives  Denies family history of PUD, IBD, colon cancer/polyps, stomach cancer/polyps, pancreatic cancer/masses, liver cancer/disease, breast/ovarian cancer and endometrial cancer.     Social History:   Former smoker  Current drinker  No drug use    ROS:   General:  No fevers, chills  ENT:  No sore throat or dysphagia  CV:  No pain or palpitations  Resp:  No dyspnea, cough, wheezing  GI:  No pain, No nausea, No vomiting, , No rectal bleeding, No tarry stools  Skin:  No rash or edema      PHYSICAL EXAM:   GENERAL:  NAD, Appears stated age  HEENT:  NC/AT,  conjunctivae clear and pink, sclera -anicteric  CHEST:  CTA B/L, Normal effort  HEART:  RRR S1/S2, No murmurs  ABDOMEN:  Soft, non-tender, non-distended, BS+, obese  EXTEREMITIES:  No cyanosis  SKIN:  Warm & Dry.  NEURO:  Alert, oriented    Vital Signs:  Vital Signs Last 24 Hrs  T(C): 37.2 (09 Apr 2019 11:10), Max: 37.2 (09 Apr 2019 11:10)  T(F): 99 (09 Apr 2019 11:10), Max: 99 (09 Apr 2019 11:10)  HR: 69 (09 Apr 2019 11:10) (61 - 74)  BP: 161/91 (09 Apr 2019 11:10) (136/84 - 170/85)  BP(mean): --  RR: 18 (09 Apr 2019 11:10) (18 - 18)  SpO2: 92% (09 Apr 2019 11:10) (92% - 95%)  Daily Height in cm: 177.8 (08 Apr 2019 19:17)    Daily     LABS:                        14.6   13.7  )-----------( 143      ( 08 Apr 2019 23:21 )             44.2     Mean Cell Volume: 99.6 fl (04-08-19 @ 23:21)    04-08    137  |  100  |  17  ----------------------------<  94  4.6   |  22  |  1.04    Ca    9.2      08 Apr 2019 19:37    TPro  7.1  /  Alb  3.8  /  TBili  1.0  /  DBili  x   /  AST  34  /  ALT  55<H>  /  AlkPhos  87  04-08    LIVER FUNCTIONS - ( 08 Apr 2019 19:37 )  Alb: 3.8 g/dL / Pro: 7.1 g/dL / ALK PHOS: 87 U/L / ALT: 55 U/L / AST: 34 U/L / GGT: x           PT/INR - ( 08 Apr 2019 19:37 )   PT: 12.2 sec;   INR: 1.06 ratio         PTT - ( 08 Apr 2019 19:37 )  PTT:26.2 sec  Urinalysis Basic - ( 08 Apr 2019 23:37 )    Color: Yellow / Appearance: Clear / SG: >1.050 / pH: x  Gluc: x / Ketone: Negative  / Bili: Negative / Urobili: 2 mg/dL   Blood: x / Protein: 30 mg/dL / Nitrite: Negative   Leuk Esterase: Negative / RBC: 3 /hpf / WBC 3 /HPF   Sq Epi: x / Non Sq Epi: 1 /hpf / Bacteria: Negative    Hemoglobin: 14.6 g/dL (04-08-19 @ 23:21)  Hemoglobin: 15.4 g/dL (04-08-19 @ 19:37)         Imaging: Chief Complaint:  Patient is a 69y old  Male who presents with a chief complaint of GI bleed (09 Apr 2019 10:10)    HPI:  69 year old male with pmhx of AAA s/p endovascular repair (2013) complicated by R pseudoaneurysm s/p repair and a Type 1A endoleak s/p repair (2017), COPD, JENSEN on CPAP, Hypertension, hyperlipidemia and CAD presents with BRBPR x 2 days. Patient reports his symptoms began 3 days ago with BRBPR whenever he past gas. This occurred roughly 8 times, with the last episode being 2 days ago. The bleeding was initially bright red, but became darker with subsequent episodes. During these episodes his stool remained normal color. He went to see his outpatient GI, Dr. Espinal, who advised him to come to the ED given his history of AAA repair. He reports some abdominal discomfort during the onset of symptoms, which has since resolved. He denies fevers, chills, chest pain, headache, dizziness, nausea, vomiting, diarrhea, constipation, palpitations, melena, hematemesis and current abdominal pain. Reports his last colonoscopy was ~ 14 years ago.     Allergies:  No Known Allergies    Home Medications:  ASA  Atorvastatin  ProAir  Anoro Ellipta  Diltiazem  Flomax  Zetia  Omeprazole  Irbesartan-HCTZ    Hospital Medications:  ALBUTerol    90 MICROgram(s) HFA Inhaler 2 Puff(s) Inhalation every 6 hours PRN  atorvastatin 80 milliGRAM(s) Oral at bedtime  ciprofloxacin   IVPB      ciprofloxacin   IVPB 400 milliGRAM(s) IV Intermittent every 12 hours  diltiazem    milliGRAM(s) Oral daily  hydrochlorothiazide 12.5 milliGRAM(s) Oral daily  losartan 50 milliGRAM(s) Oral daily  metroNIDAZOLE  IVPB      metroNIDAZOLE  IVPB 500 milliGRAM(s) IV Intermittent every 8 hours  pantoprazole  Injectable 40 milliGRAM(s) IV Push two times a day  tamsulosin 0.4 milliGRAM(s) Oral at bedtime    PMHX/PSHX:  JENSEN (obstructive sleep apnea)  HTN (hypertension)  Current smoker  Umbilical hernia  Inguinal hernia  Herniated lumbar intervertebral disc  Hyperlipidemia  Heart murmur  Right bundle branch block  CAD (coronary artery disease)  AAA (abdominal aortic aneurysm)  S/P laminectomy  S/P hernia repair  S/P hernia repair  S/P tonsillectomy    Family history:  No pertinent family history in first degree relatives  Denies family history of PUD, IBD, colon cancer/polyps, stomach cancer/polyps, pancreatic cancer/masses, liver cancer/disease, breast/ovarian cancer and endometrial cancer.     Social History:   Former smoker  Current drinker  No drug use    ROS:   General:  No fevers, chills  ENT:  No sore throat or dysphagia  CV:  No pain or palpitations  Resp:  No dyspnea, cough, wheezing  GI:  No pain, No nausea, No vomiting, , No rectal bleeding, No tarry stools  Skin:  No rash or edema      PHYSICAL EXAM:   GENERAL:  NAD, Appears stated age  HEENT:  NC/AT,  conjunctivae clear and pink, sclera -anicteric  CHEST:  CTA B/L, Normal effort  HEART:  RRR S1/S2, No murmurs  ABDOMEN:  Soft, non-tender, non-distended, BS+, obese  EXTEREMITIES:  No cyanosis  SKIN:  Warm & Dry.  NEURO:  Alert, oriented    Vital Signs:  Vital Signs Last 24 Hrs  T(C): 37.2 (09 Apr 2019 11:10), Max: 37.2 (09 Apr 2019 11:10)  T(F): 99 (09 Apr 2019 11:10), Max: 99 (09 Apr 2019 11:10)  HR: 69 (09 Apr 2019 11:10) (61 - 74)  BP: 161/91 (09 Apr 2019 11:10) (136/84 - 170/85)  BP(mean): --  RR: 18 (09 Apr 2019 11:10) (18 - 18)  SpO2: 92% (09 Apr 2019 11:10) (92% - 95%)  Daily Height in cm: 177.8 (08 Apr 2019 19:17)    Daily     LABS:                        14.6   13.7  )-----------( 143      ( 08 Apr 2019 23:21 )             44.2     Mean Cell Volume: 99.6 fl (04-08-19 @ 23:21)    04-08    137  |  100  |  17  ----------------------------<  94  4.6   |  22  |  1.04    Ca    9.2      08 Apr 2019 19:37    TPro  7.1  /  Alb  3.8  /  TBili  1.0  /  DBili  x   /  AST  34  /  ALT  55<H>  /  AlkPhos  87  04-08    LIVER FUNCTIONS - ( 08 Apr 2019 19:37 )  Alb: 3.8 g/dL / Pro: 7.1 g/dL / ALK PHOS: 87 U/L / ALT: 55 U/L / AST: 34 U/L / GGT: x           PT/INR - ( 08 Apr 2019 19:37 )   PT: 12.2 sec;   INR: 1.06 ratio         PTT - ( 08 Apr 2019 19:37 )  PTT:26.2 sec  Urinalysis Basic - ( 08 Apr 2019 23:37 )    Color: Yellow / Appearance: Clear / SG: >1.050 / pH: x  Gluc: x / Ketone: Negative  / Bili: Negative / Urobili: 2 mg/dL   Blood: x / Protein: 30 mg/dL / Nitrite: Negative   Leuk Esterase: Negative / RBC: 3 /hpf / WBC 3 /HPF   Sq Epi: x / Non Sq Epi: 1 /hpf / Bacteria: Negative    Hemoglobin: 14.6 g/dL (04-08-19 @ 23:21)  Hemoglobin: 15.4 g/dL (04-08-19 @ 19:37)         Imaging:    < from: CT Abdomen and Pelvis w/wo IV Cont (04.08.19 @ 21:10) >  IMPRESSION:   No evidence of gastrointestinal hemorrhage.    Ascending and proximal transverse colon colitis.    Status post endovascular aortic aneurysm repair, the aneurysm measures   8.2 x 7.8 cm, increasedin size in comparison to prior study dating   < end of copied text > Chief Complaint:  Patient is a 69y old  Male who presents with a chief complaint of GI bleed (09 Apr 2019 10:10)    HPI:  69 year old male with pmhx of AAA s/p endovascular repair (2013) complicated by R pseudoaneurysm s/p repair and a Type 1A endoleak s/p repair (2017), COPD, JENSEN on CPAP, Hypertension, hyperlipidemia and CAD presents with BRBPR x 2 days. Patient reports his symptoms began 3 days ago with BRBPR whenever he past gas. This occurred roughly 8 times, with the last episode being 2 days ago. The bleeding was initially bright red, but became darker with subsequent episodes. During these episodes his stool remained normal color. He went to see his outpatient GI, Dr. Espinal, who advised him to come to the ED given his history of AAA repair. He reports some abdominal discomfort during the onset of symptoms, which has since resolved. He denies fevers, chills, chest pain, headache, dizziness, nausea, vomiting, diarrhea, constipation, palpitations, melena, hematemesis and current abdominal pain. Reports his last colonoscopy was ~ 14 years ago. Per patient Dr. Espinal is planning for Colonoscopy upon discharge from the hospital.    Allergies:  No Known Allergies    Home Medications:  ASA  Atorvastatin  ProAir  Anoro Ellipta  Diltiazem  Flomax  Zetia  Omeprazole  Irbesartan-HCTZ    Hospital Medications:  ALBUTerol    90 MICROgram(s) HFA Inhaler 2 Puff(s) Inhalation every 6 hours PRN  atorvastatin 80 milliGRAM(s) Oral at bedtime  ciprofloxacin   IVPB      ciprofloxacin   IVPB 400 milliGRAM(s) IV Intermittent every 12 hours  diltiazem    milliGRAM(s) Oral daily  hydrochlorothiazide 12.5 milliGRAM(s) Oral daily  losartan 50 milliGRAM(s) Oral daily  metroNIDAZOLE  IVPB      metroNIDAZOLE  IVPB 500 milliGRAM(s) IV Intermittent every 8 hours  pantoprazole  Injectable 40 milliGRAM(s) IV Push two times a day  tamsulosin 0.4 milliGRAM(s) Oral at bedtime    PMHX/PSHX:  JENSEN (obstructive sleep apnea)  HTN (hypertension)  Current smoker  Umbilical hernia  Inguinal hernia  Herniated lumbar intervertebral disc  Hyperlipidemia  Heart murmur  Right bundle branch block  CAD (coronary artery disease)  AAA (abdominal aortic aneurysm)  S/P laminectomy  S/P hernia repair  S/P hernia repair  S/P tonsillectomy    Family history:  No pertinent family history in first degree relatives  Denies family history of PUD, IBD, colon cancer/polyps, stomach cancer/polyps, pancreatic cancer/masses, liver cancer/disease, breast/ovarian cancer and endometrial cancer.     Social History:   Former smoker  Current drinker  No drug use    ROS:   General:  No fevers, chills  ENT:  No sore throat or dysphagia  CV:  No pain or palpitations  Resp:  No dyspnea, cough, wheezing  GI:  No pain, No nausea, No vomiting, , No rectal bleeding, No tarry stools  Skin:  No rash or edema      PHYSICAL EXAM:   GENERAL:  NAD, Appears stated age  HEENT:  NC/AT,  conjunctivae clear and pink, sclera -anicteric  CHEST:  CTA B/L, Normal effort  HEART:  RRR S1/S2, No murmurs  ABDOMEN:  Soft, non-tender, non-distended, BS+, obese  EXTEREMITIES:  No cyanosis  SKIN:  Warm & Dry.  NEURO:  Alert, oriented    Vital Signs:  Vital Signs Last 24 Hrs  T(C): 37.2 (09 Apr 2019 11:10), Max: 37.2 (09 Apr 2019 11:10)  T(F): 99 (09 Apr 2019 11:10), Max: 99 (09 Apr 2019 11:10)  HR: 69 (09 Apr 2019 11:10) (61 - 74)  BP: 161/91 (09 Apr 2019 11:10) (136/84 - 170/85)  BP(mean): --  RR: 18 (09 Apr 2019 11:10) (18 - 18)  SpO2: 92% (09 Apr 2019 11:10) (92% - 95%)  Daily Height in cm: 177.8 (08 Apr 2019 19:17)    Daily     LABS:                        14.6   13.7  )-----------( 143      ( 08 Apr 2019 23:21 )             44.2     Mean Cell Volume: 99.6 fl (04-08-19 @ 23:21)    04-08    137  |  100  |  17  ----------------------------<  94  4.6   |  22  |  1.04    Ca    9.2      08 Apr 2019 19:37    TPro  7.1  /  Alb  3.8  /  TBili  1.0  /  DBili  x   /  AST  34  /  ALT  55<H>  /  AlkPhos  87  04-08    LIVER FUNCTIONS - ( 08 Apr 2019 19:37 )  Alb: 3.8 g/dL / Pro: 7.1 g/dL / ALK PHOS: 87 U/L / ALT: 55 U/L / AST: 34 U/L / GGT: x           PT/INR - ( 08 Apr 2019 19:37 )   PT: 12.2 sec;   INR: 1.06 ratio         PTT - ( 08 Apr 2019 19:37 )  PTT:26.2 sec  Urinalysis Basic - ( 08 Apr 2019 23:37 )    Color: Yellow / Appearance: Clear / SG: >1.050 / pH: x  Gluc: x / Ketone: Negative  / Bili: Negative / Urobili: 2 mg/dL   Blood: x / Protein: 30 mg/dL / Nitrite: Negative   Leuk Esterase: Negative / RBC: 3 /hpf / WBC 3 /HPF   Sq Epi: x / Non Sq Epi: 1 /hpf / Bacteria: Negative    Hemoglobin: 14.6 g/dL (04-08-19 @ 23:21)  Hemoglobin: 15.4 g/dL (04-08-19 @ 19:37)         Imaging:    < from: CT Abdomen and Pelvis w/wo IV Cont (04.08.19 @ 21:10) >  IMPRESSION:   No evidence of gastrointestinal hemorrhage.    Ascending and proximal transverse colon colitis.    Status post endovascular aortic aneurysm repair, the aneurysm measures   8.2 x 7.8 cm, increasedin size in comparison to prior study dating   < end of copied text >

## 2019-04-09 NOTE — H&P ADULT - PROBLEM SELECTOR PLAN 1
Pt presents with episodes of hematochezia/ blood in toilet over the past 2 days   No further episodes of bleeding since sunday   Trend h/h pending cbc at 10am, check at 1pm  Currently hemodynamically stable   Clears diet   C/w protinix  GI eval

## 2019-04-09 NOTE — H&P ADULT - HISTORY OF PRESENT ILLNESS
69 year old male with Hx AAA s/p endovascular repair 1/3/13 c/b right pseudoaneursym s/p repair '13 and a Type 1A endoleak s/p repair 11/16/17, COPD, JENSEN on CPAP, HTN, HLD, CAD presents with episodes of hematochezia over the past 2 days. Pt reports he was at home on saturday went to the bathroom and had 8 episodes of hematochezia/ blood in toilet. Symptoms continued on sunday. Reports blood was bright then progressively became darker on sunday. Pt had a colonoscopy about 14 years ago, has not had an endoscopy. Episodes were associated with mild shortness of breath. Currently denies chest pain, palpitations, sob, fevers, chills, n/v, abdominal pain. No syncopal episodes, no recent travel hx, no sick contacts. No further episodes of bleeding today. Reports he is scheduled to follow up with outpt GI Dr. Espinal

## 2019-04-09 NOTE — CONSULT NOTE ADULT - ASSESSMENT
Impression:  # Ascending / Proximal Transverse colon thickening  # AAA s/p endovascular repair (2013) complicated by R pseudoaneurysm s/p repair and a Type 1A endoleak s/p repair (2017),  # COPD / JENSEN on CPAP  # Hypertension    Recommendation:    Cori Pena MD  Gastroenterology Fellow  [phone]  Please page on call fellow on weekends and after 5pm on weekdays Impression:  # Ascending / Proximal Transverse colon thickening: Differential includes infectious, inflammatory and less likely ischemic etiologies.  # AAA s/p endovascular repair (2013) complicated by R pseudoaneurysm s/p repair and a Type 1A endoleak s/p repair (2017),  # COPD / JENSEN on CPAP  # Hypertension    Recommendation:  - Check Stool PCR  - Trend CBC  - Vascular follow up  - Supportive care per primary team    Cori Pena MD  Gastroenterology Fellow  [phone]  Please page on call fellow on weekends and after 5pm on weekdays Impression:  # Ascending / Proximal Transverse colon thickening: Differential includes infectious, inflammatory and less likely ischemic etiologies.  # AAA s/p endovascular repair (2013) complicated by R pseudoaneurysm s/p repair and a Type 1A endoleak s/p repair (2017),  # COPD / JENSEN on CPAP  # Hypertension    Recommendation:  - Check Stool PCR  - Colonoscopy tentatively scheduled for Thursday  - Trend CBC  - Vascular follow up  - Supportive care per primary team    Cori Pena MD  Gastroenterology Fellow  [phone]  Please page on call fellow on weekends and after 5pm on weekdays

## 2019-04-09 NOTE — H&P ADULT - NSICDXPASTSURGICALHX_GEN_ALL_CORE_FT
PAST SURGICAL HISTORY:  S/P hernia repair ' 2007   Umbilical with Mesh    S/P hernia repair 5/2012   Umbilical with Mesh    S/P laminectomy ' 90's   Lumbar    S/P tonsillectomy childhood

## 2019-04-10 NOTE — PROGRESS NOTE ADULT - ASSESSMENT
Impression:  # Ascending / Proximal Transverse colon thickening: Differential includes infectious, inflammatory and less likely ischemic etiologies.  # AAA s/p endovascular repair (2013) complicated by R pseudoaneurysm s/p repair and a Type 1A endoleak s/p repair (2017),  # COPD / JENSEN on CPAP  # Hypertension    Recommendation:  - Colonoscopy tomorrow  - Clear liquid diet, NPO @ MN  - Prep ordered  - Supportive care per primary team    Cori Pena MD  Gastroenterology Fellow  [phone]  Please page on call fellow on weekends and after 5pm on weekdays Impression:  # Ascending / Proximal Transverse colon thickening: Differential includes infectious, inflammatory and less likely ischemic etiologies.  # AAA s/p endovascular repair (2013) complicated by R pseudoaneurysm s/p repair and a Type 1A endoleak s/p repair (2017),  # COPD / JENSEN on CPAP  # Hypertension    Recommendation:  - Cardiology clearance prior to Colonoscopy  - Colonoscopy tomorrow  - Clear liquid diet, NPO @ MN  - Prep ordered  - Supportive care per primary team    Cori Pena MD  Gastroenterology Fellow  [phone]  Please page on call fellow on weekends and after 5pm on weekdays

## 2019-04-10 NOTE — PROGRESS NOTE ADULT - PROBLEM SELECTOR PLAN 3
-Vascular recs appreciated; pt will require an angiogram and possible coiling of the Lumbar artery   -No acute surgical intervention can follow up as outpt

## 2019-04-10 NOTE — CONSULT NOTE ADULT - SUBJECTIVE AND OBJECTIVE BOX
MRN-[id number]    CHIEF COMPLAINT:  Patient is a 69y old  Male who presents with a chief complaint of GI bleed (10 Apr 2019 11:51)      HISTORY OF PRESENT ILLNESS:  WYATT GATES is a 69y Male patient with past medical history of *** presenting with ***.     Allergies    No Known Allergies    Intolerances    	    PAST MEDICAL & SURGICAL HISTORY:  JENSEN (obstructive sleep apnea)  HTN (hypertension)  Current smoker: 1ppd X 40 years  Umbilical hernia: 5/2012 surgically treated  Inguinal hernia: RIH   &#x27; 2007 surgically treated  Herniated lumbar intervertebral disc: surgically treated &#x27; 90&#x27;s.    dx: 10/2012: recurrent herniated Lumbar Discs. Furthur workup to be done after AAA repair  Hyperlipidemia  Heart murmur  Right bundle branch block  CAD (coronary artery disease)  AAA (abdominal aortic aneurysm): dx: 10/2012  S/P laminectomy: &#x27; 90&#x27;s   Lumbar  S/P hernia repair: 5/2012   Umbilical with Mesh  S/P hernia repair: &#x27; 2007   Umbilical with Mesh  S/P tonsillectomy: childhood      FAMILY HISTORY:  No pertinent family history in first degree relatives      SOCIAL HISTORY:    [ ] Non-smoker  [ ] Smoker  [ ] Alcohol    REVIEW OF SYSTEMS:  CONSTITUTIONAL: No fever, weight loss, or fatigue  EYES: No eye pain, visual disturbances, or discharge  ENMT:  No difficulty hearing, tinnitus, vertigo; No sinus or throat pain  NECK: No pain or stiffness  RESPIRATORY: No cough, wheezing, chills or hemoptysis; No Shortness of Breath  CARDIOVASCULAR: No chest pain, palpitations, passing out, dizziness, or leg swelling  GASTROINTESTINAL: No abdominal or epigastric pain. No nausea, vomiting, or hematemesis; No diarrhea or constipation. No melena or hematochezia.  GENITOURINARY: No dysuria, frequency, hematuria, or incontinence  NEUROLOGICAL: No headaches, memory loss, loss of strength, numbness, or tremors  SKIN: No itching, burning, rashes, or lesions   LYMPH Nodes: No enlarged glands  ENDOCRINE: No heat or cold intolerance; No hair loss  MUSCULOSKELETAL: No joint pain or swelling; No muscle, back, or extremity pain  PSYCHIATRIC: No depression, anxiety, mood swings, or difficulty sleeping  HEME/LYMPH: No easy bruising, or bleeding gums  ALLERY AND IMMUNOLOGIC: No hives or eczema	    [ ] All others negative	  [ ] Unable to obtain    I&O's Summary    09 Apr 2019 07:01  -  10 Apr 2019 07:00  --------------------------------------------------------  IN: 0 mL / OUT: 650 mL / NET: -650 mL    10 Apr 2019 07:01  -  10 Apr 2019 16:50  --------------------------------------------------------  IN: 1250 mL / OUT: 580 mL / NET: 670 mL        PHYSICAL EXAM:  Vital Signs Last 24 Hrs  T(C): 36.7 (10 Apr 2019 14:52), Max: 36.9 (09 Apr 2019 20:49)  T(F): 98 (10 Apr 2019 14:52), Max: 98.4 (09 Apr 2019 20:49)  HR: 48 (10 Apr 2019 14:52) (48 - 65)  BP: 117/64 (10 Apr 2019 14:52) (117/64 - 151/88)  BP(mean): --  RR: 19 (10 Apr 2019 14:52) (18 - 19)  SpO2: 94% (10 Apr 2019 14:52) (91% - 94%)  Appearance: Normal	  HEENT:   Normal oral mucosa, PERRL, EOMI	  Lymphatic: No lymphadenopathy  Cardiovascular: Normal S1 S2, No JVD, No murmurs, No edema  Respiratory: Lungs clear to auscultation	  Psychiatry: A & O x 3, Mood & affect appropriate  Gastrointestinal:  Soft, Non-tender, + BS	  Skin: No rashes, No ecchymoses, No cyanosis	  Neurologic: Non-focal  Extremities: Normal range of motion, No clubbing, cyanosis or edema  Vascular: Peripheral pulses palpable 2+ bilaterally    MEDICATIONS:  MEDICATIONS  (STANDING):  atorvastatin 80 milliGRAM(s) Oral at bedtime  ciprofloxacin   IVPB      ciprofloxacin   IVPB 400 milliGRAM(s) IV Intermittent every 12 hours  diltiazem    milliGRAM(s) Oral daily  hydrochlorothiazide 12.5 milliGRAM(s) Oral daily  losartan 50 milliGRAM(s) Oral daily  metroNIDAZOLE  IVPB      metroNIDAZOLE  IVPB 500 milliGRAM(s) IV Intermittent every 8 hours  pantoprazole  Injectable 40 milliGRAM(s) IV Push two times a day  polyethylene glycol/electrolyte Solution 1000 milliLiter(s) Oral every 4 hours  tamsulosin 0.4 milliGRAM(s) Oral at bedtime    MEDICATIONS  (PRN):  ALBUTerol    90 MICROgram(s) HFA Inhaler 2 Puff(s) Inhalation every 6 hours PRN Shortness of Breath and/or Wheezing      LABS:	 	  CBC Full  -  ( 10 Apr 2019 06:39 )  WBC Count : 9.9 K/uL  Hemoglobin : 14.6 g/dL  Hematocrit : 43.3 %  Platelet Count - Automated : 161 K/uL  Mean Cell Volume : 99.5 fl  Mean Cell Hemoglobin : 33.5 pg  Mean Cell Hemoglobin Concentration : 33.6 gm/dL  Auto Neutrophil # : 7.2 K/uL  Auto Lymphocyte # : 1.8 K/uL  Auto Monocyte # : 0.7 K/uL  Auto Eosinophil # : 0.2 K/uL  Auto Basophil # : 0.1 K/uL  Auto Neutrophil % : 71.9 %  Auto Lymphocyte % : 18.2 %  Auto Monocyte % : 7.3 %  Auto Eosinophil % : 2.0 %  Auto Basophil % : 0.6 %    04-10    139  |  102  |  18  ----------------------------<  110<H>  3.8   |  24  |  1.18  04-08    137  |  100  |  17  ----------------------------<  94  4.6   |  22  |  1.04    Ca    8.8      10 Apr 2019 06:39  Ca    9.2      08 Apr 2019 19:37  Phos  3.0     04-10  Mg     2.1     04-10    TPro  6.5  /  Alb  3.6  /  TBili  0.9  /  DBili  x   /  AST  30  /  ALT  51<H>  /  AlkPhos  75  04-10  TPro  7.1  /  Alb  3.8  /  TBili  1.0  /  DBili  x   /  AST  34  /  ALT  55<H>  /  AlkPhos  87  04-08    PT/INR - ( 08 Apr 2019 19:37 )   PT: 12.2 sec;   INR: 1.06 ratio         PTT - ( 08 Apr 2019 19:37 )  PTT:26.2 sec        proBNP:   Lipid Profile:   HgA1c:   TSH:     TELEMETRY: 	    ECG:  	  RADIOLOGY:  OTHER: 	    CARDIAC TESTING/STUDIES:    [ ] Echocardiogram:  [ ]  Catheterization:  [ ] Stress Test:  	  	  ASSESSMENT/PLAN: 	      John Lopez MD, MPH, MIA  Cardiovascular Specialist Attending  [facility]  C: [phone]  E: [email or web address]  (Cardiology Nocturnist cell number available 7 pm - 7 am every night; available daytime week days for follow-up only; daytime weekends covered by general cardiology consult service) MRN-[id number]    CHIEF COMPLAINT:  GI bleed    HISTORY OF PRESENT ILLNESS:  WYATT GATES is a morbidly obese 69y Male patient with past medical history of AAA s/p endovascular repair (2013) complicated by right pseudoaneurysm s/p repair and a Type 1A endoleak s/p repair (2017), COPD on oxygen, JENSEN on CPAP, Hypertension, hyperlipidemia and CAD (coronary artery calcification on CT but does not endorse MI, arrythmia, or heart failure) presenting with BRBPR x 2 days. Negative infectious workup. CT abd/pel imaging with ascending and proximal colon colitis. Planned for colonoscopy per GI. Denies chest pain, shortness of breath beyond baseline, lower extremity edema. Cardiology consulted for pre-operative optimization.    Allergies  No Known Allergies	    PAST MEDICAL & SURGICAL HISTORY:  JENSEN (obstructive sleep apnea)  HTN (hypertension)  Current smoker: 1ppd X 40 years  Umbilical hernia: 5/2012 surgically treated  Inguinal hernia: [place]   &#x27; 2007 surgically treated  Herniated lumbar intervertebral disc: surgically treated &#x27; 90&#x27;s.    dx: 10/2012: recurrent herniated Lumbar Discs. Furthur workup to be done after AAA repair  Hyperlipidemia  Heart murmur  Right bundle branch block  CAD (coronary artery disease)  AAA (abdominal aortic aneurysm): dx: 10/2012  S/P laminectomy: &#x27; 90&#x27;s   Lumbar  S/P hernia repair: 5/2012   Umbilical with Mesh  S/P hernia repair: &#x27; 2007   Umbilical with Mesh  S/P tonsillectomy: childhood    FAMILY HISTORY:  No pertinent family history in first degree relatives    SOCIAL HISTORY:    Former smoker     REVIEW OF SYSTEMS:  CONSTITUTIONAL: No fever, weight loss, Positive fatigue  EYES: No eye pain, visual disturbance  ENMT:  No difficulty hearing, tinnitus  NECK: No pain or stiffness  RESPIRATORY: No cough, chills or hemoptysis; Positive chronic Shortness of Breath and occasional wheezing   CARDIOVASCULAR: No chest pain, palpitations, passing out, dizziness, or leg swelling  GASTROINTESTINAL: No nausea, vomiting, or hematemesis; Positive hematochezia and abdominal pain.   NEUROLOGICAL: No headaches, memory loss  SKIN: No itching, burning, rashes, or lesions   LYMPH Nodes: No enlarged glands  MUSCULOSKELETAL: Positive back and joint pain  PSYCHIATRIC: No depression, anxiety  HEME/LYMPH: No easy bruising, or bleeding gums    I&O's Summary    09 Apr 2019 07:01  -  10 Apr 2019 07:00  --------------------------------------------------------  IN: 0 mL / OUT: 650 mL / NET: -650 mL    10 Apr 2019 07:01  -  10 Apr 2019 16:50  --------------------------------------------------------  IN: 1250 mL / OUT: 580 mL / NET: 670 ml    PHYSICAL EXAM:  Vital Signs Last 24 Hrs  T(C): 36.7 (10 Apr 2019 14:52), Max: 36.9 (09 Apr 2019 20:49)  T(F): 98 (10 Apr 2019 14:52), Max: 98.4 (09 Apr 2019 20:49)  HR: 48 (10 Apr 2019 14:52) (48 - 65)  BP: 117/64 (10 Apr 2019 14:52) (117/64 - 151/88)  RR: 19 (10 Apr 2019 14:52) (18 - 19)  SpO2: 94% (10 Apr 2019 14:52) (91% - 94%)    Appearance: Normal	  HEENT:   Normal oral mucosa, PERRL, EOMI	  Lymphatic: No lymphadenopathy  Cardiovascular: Normal S1 S2, No JVD, No murmurs, No edema  Respiratory: Lungs clear to auscultation	  Psychiatry: A & O x 3, Mood & affect appropriate  Gastrointestinal:  Soft, Non-tender, + BS	  Skin: No rashes, No ecchymoses, No cyanosis	  Neurologic: Non-focal  Extremities: Normal range of motion, No clubbing, cyanosis or edema  Vascular: Peripheral pulses palpable 2+ bilaterally    MEDICATIONS:  MEDICATIONS  (STANDING):  atorvastatin 80 milliGRAM(s) Oral at bedtime  ciprofloxacin   IVPB      ciprofloxacin   IVPB 400 milliGRAM(s) IV Intermittent every 12 hours  diltiazem    milliGRAM(s) Oral daily  hydrochlorothiazide 12.5 milliGRAM(s) Oral daily  losartan 50 milliGRAM(s) Oral daily  metroNIDAZOLE  IVPB      metroNIDAZOLE  IVPB 500 milliGRAM(s) IV Intermittent every 8 hours  pantoprazole  Injectable 40 milliGRAM(s) IV Push two times a day  polyethylene glycol/electrolyte Solution 1000 milliLiter(s) Oral every 4 hours  tamsulosin 0.4 milliGRAM(s) Oral at bedtime    MEDICATIONS  (PRN):  ALBUTerol    90 MICROgram(s) HFA Inhaler 2 Puff(s) Inhalation every 6 hours PRN Shortness of Breath and/or Wheezing    LABS:	 	  CBC Full  -  ( 10 Apr 2019 06:39 )  WBC Count : 9.9 K/uL  Hemoglobin : 14.6 g/dL  Hematocrit : 43.3 %  Platelet Count - Automated : 161 K/uL  Mean Cell Volume : 99.5 fl  Mean Cell Hemoglobin : 33.5 pg  Mean Cell Hemoglobin Concentration : 33.6 gm/dL  Auto Neutrophil # : 7.2 K/uL  Auto Lymphocyte # : 1.8 K/uL  Auto Monocyte # : 0.7 K/uL  Auto Eosinophil # : 0.2 K/uL  Auto Basophil # : 0.1 K/uL  Auto Neutrophil % : 71.9 %  Auto Lymphocyte % : 18.2 %  Auto Monocyte % : 7.3 %  Auto Eosinophil % : 2.0 %  Auto Basophil % : 0.6 %    04-10    139  |  102  |  18  ----------------------------<  110<H>  3.8   |  24  |  1.18  04-08    137  |  100  |  17  ----------------------------<  94  4.6   |  22  |  1.04    Ca    8.8      10 Apr 2019 06:39  Ca    9.2      08 Apr 2019 19:37  Phos  3.0     04-10  Mg     2.1     04-10    TPro  6.5  /  Alb  3.6  /  TBili  0.9  /  DBili  x   /  AST  30  /  ALT  51<H>  /  AlkPhos  75  04-10  TPro  7.1  /  Alb  3.8  /  TBili  1.0  /  DBili  x   /  AST  34  /  ALT  55<H>  /  AlkPhos  87  04-08    PT/INR - ( 08 Apr 2019 19:37 )   PT: 12.2 sec;   INR: 1.06 ratio      PTT - ( 08 Apr 2019 19:37 )  PTT:26.2 sec    TELEMETRY 4/10/2019  NSR     ECHO 4/10/2019  Conclusions:  1. Moderate concentric left ventricular hypertrophy.  2. Endocardium not well visualized; grossly normal left  ventricular systolic function.  3. Normal right ventricular size and function.  4. Moderately dilated aortic root.    CT Abd/Pel 4/8/2019  IMPRESSION:   No evidence of gastrointestinal hemorrhage.    Ascending and proximal transverse colon colitis.    Status post endovascular aortic aneurysm repair, the aneurysm measures   8.2 x 7.8 cm, increased in size in comparison to prior study dating   5/22/2018. There is faint high density material within the aneurysm sac   on arterial phase imaging which may represent a type II endoleak.      ASSESSMENT/PLAN: 	  69y Male patient with past medical history of AAA s/p endovascular repair (2013) complicated by right pseudoaneurysm s/p repair and a Type 1A endoleak s/p repair (2017), COPD on oxygen, JENSEN on CPAP, Hypertension, hyperlipidemia, and CAD (coronary artery calcification on CT but does not endorse MI, arrythmia, or heart failure) presenting with hematochezia and planned colonoscopy.     1) Pre-operative Optimization   RCRI: 0 - Approximately 4% risk of death, MI, or cardiac arrest   METS >4  Low/intermediate risk procedure   No absolute contraindications including decompensated heart failure, life threatening arrythmia, or active chest pain.     ·	No further cardiac testing indicated. Nothing further can reduce his cardiac risk.   ·	Optimized from a cardiac standpoint to proceed with non-cardiac procedure/surgery.     2) CAD   Coronary artery Calcification  Multiple risk factors    ·	Continue medical management with atorvastatin 80 mg nightly.  ·	Holding aspirin 81 mg daily in the setting of GIB.     3) HTN   Well controlled.   Moderate LVH.    ·	Continue medical management with diltiazem 180 mg daily, HCTZ 12.5 mg daily, losartan 50 mg daily.    4) HLD   Coronary artery Calcification, multiple risk factors    ·	Continue medical management with atorvastatin 80 mg nightly.    5) AAA  s/p endovascular repair (2013) complicated by right pseudoaneurysm s/p repair and a Type 1A endoleak s/p repair (2017)    ·	Management as per Vascular surgery.     Signing off.     John Lopez MD, MPH, MIA  Cardiovascular Specialist Attending  [facility]  C: [phone]  E: [email or web address]  (Cardiology Nocturnist cell number available 7 pm - 7 am every night; available daytime week days for follow-up only; daytime weekends covered by general cardiology consult service)

## 2019-04-10 NOTE — PROGRESS NOTE ADULT - PROBLEM SELECTOR PLAN 1
-Resolved  -Concern for ischemic colitis  -H/H stable since admission  -GI recs appreciated; plan for colonoscopy tomorrow. CLD for today. NPO after midnight  -Cardiology clearance for procedure  -Continue with protonix

## 2019-04-11 NOTE — DISCHARGE NOTE PROVIDER - CARE PROVIDER_API CALL
Marlon Israel)  Family Medicine  [address] [place] [address]  Phone: [phone]  Fax: [phone]  Follow Up Time: 1 week    Jere Hernadez)  Gastroenterology; Internal Medicine  [address] [place] [address], [facility] [address]  [place] [address]  Phone: [phone]  Fax: [phone]  Follow Up Time: 2 weeks    vascular team,   Phone: (   )    -  Fax: (   )    -  Follow Up Time:

## 2019-04-11 NOTE — DISCHARGE NOTE PROVIDER - CARE PROVIDERS DIRECT ADDRESSES
,DirectAddress_Unknown,[email or web address],DirectAddress_Unknown

## 2019-04-11 NOTE — DISCHARGE NOTE PROVIDER - NSDCPNSUBOBJ_GEN_ALL_CORE
Ryan Hernandez M.D. Pager Number [phone]        Patient is a 69y old  Male who presents with a chief complaint of GI bleed (11 Apr 2019 11:08)            SUBJECTIVE / OVERNIGHT EVENTS:    Pt seen and examined at bedside. No acute events overnight. Pt s/p Colonoscopy this AM. Tolerated procedure well. + Voiding and Flatus    Pt denies cp, palpitations, sob, abd pain, N/V, fever, chills.         ROS:    All other review of systems negative        Allergies        No Known Allergies        Intolerances                MEDICATIONS  (STANDING):    atorvastatin 80 milliGRAM(s) Oral at bedtime    ciprofloxacin   IVPB        ciprofloxacin   IVPB 400 milliGRAM(s) IV Intermittent every 12 hours    diltiazem    milliGRAM(s) Oral daily    hydrochlorothiazide 12.5 milliGRAM(s) Oral daily    losartan 50 milliGRAM(s) Oral daily    metroNIDAZOLE  IVPB        metroNIDAZOLE  IVPB 500 milliGRAM(s) IV Intermittent every 8 hours    pantoprazole  Injectable 40 milliGRAM(s) IV Push two times a day    tamsulosin 0.4 milliGRAM(s) Oral at bedtime        MEDICATIONS  (PRN):    ALBUTerol    90 MICROgram(s) HFA Inhaler 2 Puff(s) Inhalation every 6 hours PRN Shortness of Breath and/or Wheezing            Vital Signs Last 24 Hrs    T(C): 36.4 (11 Apr 2019 04:14), Max: 36.8 (10 Apr 2019 20:26)    T(F): 97.5 (11 Apr 2019 04:14), Max: 98.2 (10 Apr 2019 20:26)    HR: 89 (11 Apr 2019 05:23) (48 - 91)    BP: 136/67 (11 Apr 2019 04:14) (116/72 - 136/67)    BP(mean): --    RR: 18 (11 Apr 2019 04:14) (18 - 19)    SpO2: 96% (11 Apr 2019 05:23) (90% - 97%)    CAPILLARY BLOOD GLUCOSE                I&O's Summary        10 Apr 2019 07:01  -  11 Apr 2019 07:00    --------------------------------------------------------    IN: 1250 mL / OUT: 880 mL / NET: 370 mL        11 Apr 2019 07:01  -  11 Apr 2019 12:27    --------------------------------------------------------    IN: 0 mL / OUT: 0 mL / NET: 0 mL                PHYSICAL EXAM:    GENERAL: NAD, Obese    CHEST/LUNG: Decreased breath sounds; No wheeze. On 3L NC    HEART: Regular rate and rhythm; No murmurs, rubs, or gallops    ABDOMEN: Soft, Nontender, Nondistended; Bowel sounds present    EXTREMITIES:  2+ Peripheral Pulses, No clubbing, cyanosis, or edema    NEUROLOGY: AAOx3, non-focal    PSYCH: calm            LABS:                            15.2     8.6   )-----------( 176      ( 11 Apr 2019 06:03 )               45.5         04-11        140  |  103  |  18    ----------------------------<  101<H>    3.8   |  25  |  1.33<H>        Ca    9.1      11 Apr 2019 06:03    Phos  3.0     04-10    Mg     2.1     04-10        TPro  6.5  /  Alb  3.6  /  TBili  0.9  /  DBili  x   /  AST  30  /  ALT  51<H>  /  AlkPhos  75  04-10        Assessment and Plan:     · Assessment        69 year old male with Hx AAA s/p endovascular repair 1/3/13 c/b right pseudoaneursym s/p repair '13 and a Type 1A endoleak s/p repair 11/16/17, COPD, JENSEN on CPAP, HTN, HLD, CAD presents with episodes of hematochezia over the past 2 days found to have GI bleed and colitis          Problem/Plan - 1:    ·  Problem: GI bleed.  Plan: -Resolved    -Concern for ischemic colitis    -H/H stable since admission    -Colonoscopy revealed Scattered moderate inflammation with shallow ulceration in the proximal transverse colon, in the mid transverse colon and in the ascending colon likely secondary to ischemic colitis. Biopsy obtained. Multiple polyp in the rectum resected for biopsy.    -Follow up with GI as outpatient for path results    -Continue with protonix.          Problem/Plan - 2:    ·  Problem: Colitis.  Plan: -Imaging consistent with Colitis    -Likely ischemic colitis, however will continue treatment for Infection    -C/w cipro and flagyl for total of 10 days    -Management as above.          Problem/Plan - 3:    ·  Problem: AAA (abdominal aortic aneurysm).  Plan: -Vascular recs appreciated; pt will require an angiogram and possible coiling of the Lumbar artery     -No acute surgical intervention can follow up as outpt.          Problem/Plan - 4:    ·  Problem: HTN (hypertension).  Plan: - Chornic    -Continue Cardizem 180mg, Losartan 50mg interchange for irbesartan 150mg, Hctz 12.5mg.          Problem/Plan - 5:    ·  Problem: CAD (coronary artery disease).  Plan: -Hold asprin 2/2 gi bleed     -C/w cardizem, losartan.          Problem/Plan - 6:    Problem: Hyperlipidemia. Plan: -c/w lipitor 80mg.         Problem/Plan - 7:    ·  Problem: JENSEN (obstructive sleep apnea).  Plan: -Pt on CPAP at home     -Start CPAP QHS while inpatient.          Problem/Plan - 8:    ·  Problem: Prophylactic measure.  Plan: scds for ppx

## 2019-04-11 NOTE — DISCHARGE NOTE PROVIDER - NSDCFUADDAPPT_GEN_ALL_CORE_FT
please follow up with PCP in 1 ~ 2 weeks.  follow up with GI for biopsy results.  follow up with your vascular doctor for AAA.

## 2019-04-11 NOTE — DISCHARGE NOTE PROVIDER - PROVIDER TOKENS
PROVIDER:[TOKEN:[[id number]],FOLLOWUP:[1 week]],PROVIDER:[TOKEN:[[id number]],FOLLOWUP:[2 weeks]],FREE:[LAST:[vascular team],PHONE:[(   )    -],FAX:[(   )    -]]

## 2019-04-11 NOTE — DISCHARGE NOTE PROVIDER - HOSPITAL COURSE
69 year old male with Hx AAA s/p endovascular repair 1/3/13 c/b right pseudoaneursym s/p repair '13 and a Type 1A endoleak s/p repair 11/16/17, COPD, JENSEN on CPAP, HTN, HLD, CAD presents with episodes of hematochezia over the past 2 days found to have GI bleed and colitis. s/p CT a/p showed No gastrointestinal hemorrhage. Ascending and proximal transverse colon colitis. S/p AAA repair, increased aneurysm size, likely type II endoleak. seen by vascular, no acute intervention. Cipro and flagy x 14days for colitis. stable H/H, s/p colonoscopy, no active bleeding. unremarkable Echo.     pt is stable to d/c home. 69 year old male with Hx AAA s/p endovascular repair 1/3/13 c/b right pseudoaneursym s/p repair '13 and a Type 1A endoleak s/p repair 11/16/17, COPD, JENSEN on CPAP, HTN, HLD, CAD presents with episodes of hematochezia over the past 2 days found to have GI bleed and colitis. s/p CT a/p showed No gastrointestinal hemorrhage. Ascending and proximal transverse colon colitis. S/p AAA repair, increased aneurysm size, likely type II endoleak. seen by vascular, no acute intervention. pt has a vascular surgeon but unable to remember the name. vascular outpt f/u. Cipro and flagy x 14days for colitis. stable H/H, s/p colonoscopy, no active bleeding. unremarkable Echo.     pt is stable to d/c home. 69 year old male with Hx AAA s/p endovascular repair 1/3/13 c/b right pseudoaneursym s/p repair '13 and a Type 1A endoleak s/p repair 11/16/17, COPD, JENSEN on CPAP, HTN, HLD, CAD presents with episodes of hematochezia over the past 2 days found to have GI bleed and colitis. s/p CT a/p showed No gastrointestinal hemorrhage, however did show colitis. Pt was stated on Cipro/Flagyl. Pt underwent Colonoscopy which revealed the following impression:    Scattered moderate inflammation with shallow ulceration in the proximal transverse colon, in the mid transverse colon and in the ascending colon likely secondary to ischemic colitis. Biopsy obtained. Multiple polyp in the rectum resected for biopsy    - Two 6 to 10 mm polyps in the rectum and in the ascending colon, removed with a hot snare. Resected and retrieved.    - One 5 mm polyp in the transverse colon, removed with a cold biopsy forceps. Resected and retrieved.    - One 4 mm polyp in the rectum, removed with a hot snare. Complete resection. Polyp tissue not retrieved.    - Three polyps in the rectum.    - Non-bleeding internal hemorrhoids.    - The examined portion of the ileum was normal.    Pt tolerated the procedure well without any issues. He denied abdominal pain, generalized weakness, N/V, diarrhea or bloody BM during hospital course. He is stable for discharge home and follow up with PMD and GI as outpatient with follow up on the path results of the biopsy. We will also continue Cipro/Flagyl for a total of 10 days as pt does have colitis noted.

## 2019-04-11 NOTE — DISCHARGE NOTE NURSING/CASE MANAGEMENT/SOCIAL WORK - NSDCDPATPORTLINK_GEN_ALL_CORE
You can access the [facility] Portal, offered by [facility], by registering with the following website: [email or web address]

## 2019-04-11 NOTE — DISCHARGE NOTE PROVIDER - NSDCCPCAREPLAN_GEN_ALL_CORE_FT
PRINCIPAL DISCHARGE DIAGNOSIS  Diagnosis: Rectal bleeding  Assessment and Plan of Treatment: hemoglobin stable since admission.  evalutated by GI.   s/p colonoscopy, no active bleeding.   Continue with protonix.         SECONDARY DISCHARGE DIAGNOSES  Diagnosis: JENSEN on CPAP  Assessment and Plan of Treatment: continue with cpap at night.       Diagnosis: HLD (hyperlipidemia)  Assessment and Plan of Treatment: Follow up with PCP for treatment goals, continue medication, have liver function testing every 3 months as anti lipid medications can cause liver irritation, eat low fat, low cholesterol meals      Diagnosis: CAD (coronary artery disease)  Assessment and Plan of Treatment: Coronary artery disease is a condition where the arteries the supply the heart muscle get clogges with fatty deposits & puts you at risk for a heart attack  Call your doctor if you have any new pain, pressure, or discomfort in the center of your chest, pain, tingling or discomfort in arms, back, neck, jaw, or stomach, shortness of breath, nausea, vomiting, burping or heartburn, sweating, cold and clammy skin, racing or abnormal heartbeat for more than 10 minutes or if they keep coming & going.  Call 911 and do not tr to get to hospital by care  You can help yourself with lefestyle changes (quitting smoking if you smoke), eat lots of fruits & vegetables & low fat dairy products, not a lot of meat & fatty foods, walk or some form of physical activity most days of the week, lose weight if you are overweight  Take your cardiac medication as prescribed to lower cholesterol, to lower blood pressure, aspirin to prevent blood clots, and diabetes control  Make sure to keep appointments with doctor for cardiac follow up care      Diagnosis: Essential hypertension  Assessment and Plan of Treatment: Low salt diet  Activity as tolerated.  Take all medication as prescribed.  Follow up with your medical doctor for routine blood pressure monitoring at your next visit.  Notify your doctor if you have any of the following symptoms:   Dizziness, Lightheadedness, Blurry vision, Headache, Chest pain, Shortness of breath      Diagnosis: S/P AAA repair  Assessment and Plan of Treatment: You will require an angiogram and possible coiling of the Lumbar artery   seen by vascular team.  No acute surgical intervention can follow up as outpt.       Diagnosis: Colitis  Assessment and Plan of Treatment: continue with antibiotics as directed.   Contact your healthcare provider if:  Your symptoms get worse or do not go away.  You have a fever, chills, cough, or feel weak and achy.  You suddenly lose weight without trying.

## 2019-04-11 NOTE — PROGRESS NOTE ADULT - SUBJECTIVE AND OBJECTIVE BOX
Pre-Endoscopy Evaluation      Referring Physician: dr. nik lamb                                   Procedure: colonoscopy    Indication for Procedure: gib    Pertinent History: 69y  male with past medical history of AAA s/p endovascular repair (2013) complicated by right pseudoaneurysm s/p repair and a Type 1A endoleak s/p repair (2017), COPD on oxygen, JENSEN on CPAP, Hypertension, hyperlipidemia, and CAD (coronary artery calcification on CT but does not endorse MI, arrythmia, or heart failure) presented with hematochezia       Sedation by Anesthesia [X]    PAST MEDICAL & SURGICAL HISTORY:  JENSEN (obstructive sleep apnea)  HTN (hypertension)  Current smoker: 1ppd X 40 years  Umbilical hernia: 5/2012 surgically treated  Inguinal hernia: RIH   &#x27; 2007 surgically treated  Herniated lumbar intervertebral disc: surgically treated &#x27; 90&#x27;s.    dx: 10/2012: recurrent herniated Lumbar Discs. Furthur workup to be done after AAA repair  Hyperlipidemia  Heart murmur  Right bundle branch block  CAD (coronary artery disease)  AAA (abdominal aortic aneurysm): dx: 10/2012  S/P laminectomy: &#x27; 90&#x27;s   Lumbar  S/P hernia repair: 5/2012   Umbilical with Mesh  S/P hernia repair: &#x27; 2007   Umbilical with Mesh  S/P tonsillectomy: childhood      PMH of Gastroparesis [ ]  Gastric Surgery [ ]  Gastric Outlet Obstruction [ ]    Allergies:    No Known Allergies    Intolerances:    Latex allergy: [ ] yes [X] no    Medications:MEDICATIONS  (STANDING):  atorvastatin 80 milliGRAM(s) Oral at bedtime  ciprofloxacin   IVPB      ciprofloxacin   IVPB 400 milliGRAM(s) IV Intermittent every 12 hours  diltiazem    milliGRAM(s) Oral daily  hydrochlorothiazide 12.5 milliGRAM(s) Oral daily  losartan 50 milliGRAM(s) Oral daily  metroNIDAZOLE  IVPB      metroNIDAZOLE  IVPB 500 milliGRAM(s) IV Intermittent every 8 hours  pantoprazole  Injectable 40 milliGRAM(s) IV Push two times a day  tamsulosin 0.4 milliGRAM(s) Oral at bedtime    MEDICATIONS  (PRN):  ALBUTerol    90 MICROgram(s) HFA Inhaler 2 Puff(s) Inhalation every 6 hours PRN Shortness of Breath and/or Wheezing      Smoking: [ ] yes  [X] no    AICD/PPM: [ ] yes   [X] no    Pertinent lab data:                        15.2   8.6   )-----------( 176      ( 11 Apr 2019 06:03 )             45.5     04-11    140  |  103  |  18  ----------------------------<  101<H>  3.8   |  25  |  1.33<H>    Ca    9.1      11 Apr 2019 06:03  Phos  3.0     04-10  Mg     2.1     04-10    TPro  6.5  /  Alb  3.6  /  TBili  0.9  /  DBili  x   /  AST  30  /  ALT  51<H>  /  AlkPhos  75  04-10    < from: Transthoracic Echocardiogram (04.10.19 @ 15:25) >      ------------------------------------------------------------------------  Observations:  Mitral Valve: Normal mitral valve.  Aortic Valve/Aorta: Aortic valve mildly thickened.  Aortic Root: 4.5 cm.  Moderately dilated aortic root.  Left Atrium: Mildly dilated left atrium.  LA volume index =  36 cc/m2.  Left Ventricle: Endocardium not well visualized; grossly  normal left ventricular systolic function. Moderate  concentric left ventricular hypertrophy.  Right Heart: Normal right atrium. Normal right ventricular  size and function. Normal tricuspid valve. Normal pulmonic  valve. Mild pulmonic regurgitation.  Pericardium/Pleura: Normal pericardium with no pericardial  effusion.  Hemodynamic: No evidence of pulmonary hypertension.  ------------------------------------------------------------------------  Conclusions:  1. Moderate concentric left ventricular hypertrophy.  2. Endocardium not well visualized; grossly normal left  ventricular systolic function.  3. Normal right ventricular size and function.  4. Moderately dilated aortic root.  Read by Alberto Urbina MD  --------------------------------------------------------------        Physical Examination:      Daily   Vital Signs Last 24 Hrs  T(C): 36.4 (11 Apr 2019 04:14), Max: 36.8 (10 Apr 2019 20:26)  T(F): 97.5 (11 Apr 2019 04:14), Max: 98.2 (10 Apr 2019 20:26)  HR: 89 (11 Apr 2019 05:23) (48 - 91)  BP: 136/67 (11 Apr 2019 04:14) (116/72 - 136/67)  BP(mean): --  RR: 18 (11 Apr 2019 04:14) (18 - 19)  SpO2: 96% (11 Apr 2019 05:23) (90% - 97%)    Drug Dosing Weight  Height (cm): 177.8 (08 Apr 2019 19:17)  Weight (kg): 123.8 (08 Apr 2019 19:17)  BMI (kg/m2): 39.2 (08 Apr 2019 19:17)  BSA (m2): 2.38 (08 Apr 2019 19:17)    Constitutional: NAD     Neck:  No JVD    Respiratory: CTAB/L    Cardiovascular: S1 and S2    Gastrointestinal: BS+, soft, NT/ND    Extremities: No peripheral edema    Neurological: A/O x 3,    : No [place]    Skin: No rashes    Comments: cardiology evaluation appreciated    ASA Class: I [ ]  II [ ]  III [ ]  IV [X]    The patient is a suitable candidate for the planned procedure unless box checked [ ]  No, explain:
Chief Complaint:  Patient is a 69y old  Male who presents with a chief complaint of GI bleed (09 Apr 2019 14:33)    Interval Events:   No acute overnight events. Patient denies any specific complaints.     Allergies:  No Known Allergies    Hospital Medications:  ALBUTerol    90 MICROgram(s) HFA Inhaler 2 Puff(s) Inhalation every 6 hours PRN  atorvastatin 80 milliGRAM(s) Oral at bedtime  ciprofloxacin   IVPB      ciprofloxacin   IVPB 400 milliGRAM(s) IV Intermittent every 12 hours  diltiazem    milliGRAM(s) Oral daily  hydrochlorothiazide 12.5 milliGRAM(s) Oral daily  losartan 50 milliGRAM(s) Oral daily  metroNIDAZOLE  IVPB      metroNIDAZOLE  IVPB 500 milliGRAM(s) IV Intermittent every 8 hours  pantoprazole  Injectable 40 milliGRAM(s) IV Push two times a day  tamsulosin 0.4 milliGRAM(s) Oral at bedtime    PMHX/PSHX:  JENSEN (obstructive sleep apnea)  HTN (hypertension)  Current smoker  Umbilical hernia  Inguinal hernia  Herniated lumbar intervertebral disc  Hyperlipidemia  Heart murmur  Right bundle branch block  CAD (coronary artery disease)  AAA (abdominal aortic aneurysm)  S/P laminectomy  S/P hernia repair  S/P hernia repair  S/P tonsillectomy    ROS:   General:  No fevers, chills  ENT:  No sore throat or dysphagia  CV:  No pain or palpitations  Resp:  No dyspnea, cough or  wheezing  GI:  No pain, No nausea, No vomiting, No rectal bleeding, No tarry stools,  Skin:  No rash or edema    PHYSICAL EXAM:   Vital Signs:  Vital Signs Last 24 Hrs  T(C): 36.4 (10 Apr 2019 05:33), Max: 37.2 (09 Apr 2019 11:10)  T(F): 97.6 (10 Apr 2019 05:33), Max: 99 (09 Apr 2019 11:10)  HR: 64 (10 Apr 2019 05:33) (64 - 69)  BP: 151/88 (10 Apr 2019 05:33) (132/76 - 161/91)  BP(mean): --  RR: 19 (10 Apr 2019 05:33) (18 - 19)  SpO2: 92% (10 Apr 2019 05:33) (91% - 92%)  Daily     Daily     GENERAL:  NAD, Appears stated age  HEENT:  NC/AT,  conjunctivae clear and pink, sclera -anicteric  CHEST:  CTA B/L, Normal effort  HEART:  RRR S1/S2, No murmurs  ABDOMEN:  Soft, non-tender, non-distended, BS+, obese  EXTEREMITIES:  No cyanosis  SKIN:  Warm & Dry.  NEURO:  Alert, oriented    LABS:                        14.6   9.9   )-----------( 161      ( 10 Apr 2019 06:39 )             43.3     Mean Cell Volume: 99.5 fl (04-10-19 @ 06:39)    04-10    139  |  102  |  18  ----------------------------<  110<H>  3.8   |  24  |  1.18    Ca    8.8      10 Apr 2019 06:39  Phos  3.0     04-10  Mg     2.1     04-10    TPro  6.5  /  Alb  3.6  /  TBili  0.9  /  DBili  x   /  AST  30  /  ALT  51<H>  /  AlkPhos  75  04-10    LIVER FUNCTIONS - ( 10 Apr 2019 06:39 )  Alb: 3.6 g/dL / Pro: 6.5 g/dL / ALK PHOS: 75 U/L / ALT: 51 U/L / AST: 30 U/L / GGT: x           PT/INR - ( 08 Apr 2019 19:37 )   PT: 12.2 sec;   INR: 1.06 ratio         PTT - ( 08 Apr 2019 19:37 )  PTT:26.2 sec  Urinalysis Basic - ( 08 Apr 2019 23:37 )    Color: Yellow / Appearance: Clear / SG: >1.050 / pH: x  Gluc: x / Ketone: Negative  / Bili: Negative / Urobili: 2 mg/dL   Blood: x / Protein: 30 mg/dL / Nitrite: Negative   Leuk Esterase: Negative / RBC: 3 /hpf / WBC 3 /HPF   Sq Epi: x / Non Sq Epi: 1 /hpf / Bacteria: Negative      Imaging:
Patient is  seen Abdomen  soft  non tender  Had no  more  blood  per  rectum  i reviewed  the  CT  scan He  does  have  a Type  2 endoleak which  was not present  in the last year  CT  scan His  AAA sac is  slightly  larger  Patient   needs  an angiogram  and  possible  coiling of the  Lumbar  artery  . This  can be  done  at  a later  setting  as outpatient  No  need  for urgent  vascular intervention He is  known to me  for  few years  following  EVAR
Ryan Hernandez M.D. Pager Number [phone]    Patient is a 69y old  Male who presents with a chief complaint of GI bleed (10 Apr 2019 08:04)      SUBJECTIVE / OVERNIGHT EVENTS:  Pt seen and examined at bedside. No acute events overnight.   Pt denies cp, palpitation, sob, abd pain, N/V, fever, chills.     ROS:  All other review of systems negative    Allergies    No Known Allergies    Intolerances        MEDICATIONS  (STANDING):  atorvastatin 80 milliGRAM(s) Oral at bedtime  ciprofloxacin   IVPB      ciprofloxacin   IVPB 400 milliGRAM(s) IV Intermittent every 12 hours  diltiazem    milliGRAM(s) Oral daily  hydrochlorothiazide 12.5 milliGRAM(s) Oral daily  losartan 50 milliGRAM(s) Oral daily  metroNIDAZOLE  IVPB      metroNIDAZOLE  IVPB 500 milliGRAM(s) IV Intermittent every 8 hours  pantoprazole  Injectable 40 milliGRAM(s) IV Push two times a day  polyethylene glycol/electrolyte Solution 1000 milliLiter(s) Oral every 4 hours  tamsulosin 0.4 milliGRAM(s) Oral at bedtime    MEDICATIONS  (PRN):  ALBUTerol    90 MICROgram(s) HFA Inhaler 2 Puff(s) Inhalation every 6 hours PRN Shortness of Breath and/or Wheezing      Vital Signs Last 24 Hrs  T(C): 36.4 (10 Apr 2019 05:33), Max: 36.9 (09 Apr 2019 20:49)  T(F): 97.6 (10 Apr 2019 05:33), Max: 98.4 (09 Apr 2019 20:49)  HR: 64 (10 Apr 2019 05:33) (64 - 65)  BP: 151/88 (10 Apr 2019 05:33) (132/76 - 151/88)  BP(mean): --  RR: 19 (10 Apr 2019 05:33) (18 - 19)  SpO2: 92% (10 Apr 2019 05:33) (91% - 92%)  CAPILLARY BLOOD GLUCOSE        I&O's Summary    09 Apr 2019 07:01  -  10 Apr 2019 07:00  --------------------------------------------------------  IN: 0 mL / OUT: 650 mL / NET: -650 mL    10 Apr 2019 07:01  -  10 Apr 2019 11:51  --------------------------------------------------------  IN: 0 mL / OUT: 150 mL / NET: -150 mL        PHYSICAL EXAM:  GENERAL: NAD, Obese  HEAD:  Atraumatic, Normocephalic  EYES: EOMI, PERRLA, conjunctiva and sclera clear  NECK: Supple, No JVD  CHEST/LUNG: Decreased breath sounds; No wheeze. On 3L NC  HEART: Regular rate and rhythm; No murmurs, rubs, or gallops  ABDOMEN: Soft, Nontender, Nondistended; Bowel sounds present  EXTREMITIES:  2+ Peripheral Pulses, No clubbing, cyanosis, or edema  NEUROLOGY: AAOx3, non-focal  PSYCH: calm  SKIN: No rashes or lesions    LABS:                        14.6   9.9   )-----------( 161      ( 10 Apr 2019 06:39 )             43.3     04-10    139  |  102  |  18  ----------------------------<  110<H>  3.8   |  24  |  1.18    Ca    8.8      10 Apr 2019 06:39  Phos  3.0     04-10  Mg     2.1     04-10    TPro  6.5  /  Alb  3.6  /  TBili  0.9  /  DBili  x   /  AST  30  /  ALT  51<H>  /  AlkPhos  75  04-10    PT/INR - ( 08 Apr 2019 19:37 )   PT: 12.2 sec;   INR: 1.06 ratio         PTT - ( 08 Apr 2019 19:37 )  PTT:26.2 sec      Urinalysis Basic - ( 08 Apr 2019 23:37 )    Color: Yellow / Appearance: Clear / SG: >1.050 / pH: x  Gluc: x / Ketone: Negative  / Bili: Negative / Urobili: 2 mg/dL   Blood: x / Protein: 30 mg/dL / Nitrite: Negative   Leuk Esterase: Negative / RBC: 3 /hpf / WBC 3 /HPF   Sq Epi: x / Non Sq Epi: 1 /hpf / Bacteria: Negative        RADIOLOGY & ADDITIONAL TESTS:    Imaging Personally Reviewed:    Consultant(s) Notes Reviewed:      Care Discussed with Consultants/Other Providers:    Case Discussed with Family:    Goals of Care:

## 2019-08-21 NOTE — PHYSICAL THERAPY INITIAL EVALUATION ADULT - PERTINENT HX OF CURRENT PROBLEM, REHAB EVAL
Normal vision: sees adequately in most situations; can see medication labels, newsprint
Pt is a 67 year old M s/p 2013 EVAR presented to [place] with abdominal pain. CT stone hunt done; demonstrated moderate left RP hemorrhage likely secondary to ruptured infrarenal AAA.  AAA is 6.4x7.4.  Transferred to [facility].  Upon arrival to [facility] hemodynamically stable.  Taken emergently  to OR for repair.

## 2020-01-01 ENCOUNTER — INPATIENT (INPATIENT)
Facility: HOSPITAL | Age: 70
LOS: 5 days | DRG: 208 | End: 2020-03-26
Attending: INTERNAL MEDICINE | Admitting: HOSPITALIST
Payer: MEDICARE

## 2020-01-01 ENCOUNTER — APPOINTMENT (OUTPATIENT)
Dept: INTERNAL MEDICINE | Facility: CLINIC | Age: 70
End: 2020-01-01
Payer: COMMERCIAL

## 2020-01-01 VITALS
OXYGEN SATURATION: 96 % | SYSTOLIC BLOOD PRESSURE: 181 MMHG | DIASTOLIC BLOOD PRESSURE: 107 MMHG | WEIGHT: 289.91 LBS | HEIGHT: 70 IN | HEART RATE: 90 BPM | RESPIRATION RATE: 20 BRPM

## 2020-01-01 DIAGNOSIS — R06.02 SHORTNESS OF BREATH: ICD-10-CM

## 2020-01-01 DIAGNOSIS — J96.01 ACUTE RESPIRATORY FAILURE WITH HYPOXIA: ICD-10-CM

## 2020-01-01 DIAGNOSIS — G47.33 OBSTRUCTIVE SLEEP APNEA (ADULT) (PEDIATRIC): ICD-10-CM

## 2020-01-01 DIAGNOSIS — F17.200 NICOTINE DEPENDENCE, UNSPECIFIED, UNCOMPLICATED: ICD-10-CM

## 2020-01-01 DIAGNOSIS — I10 ESSENTIAL (PRIMARY) HYPERTENSION: ICD-10-CM

## 2020-01-01 DIAGNOSIS — I71.4 ABDOMINAL AORTIC ANEURYSM, WITHOUT RUPTURE: ICD-10-CM

## 2020-01-01 DIAGNOSIS — J44.1 CHRONIC OBSTRUCTIVE PULMONARY DISEASE WITH (ACUTE) EXACERBATION: ICD-10-CM

## 2020-01-01 DIAGNOSIS — B34.2 CORONAVIRUS INFECTION, UNSPECIFIED: ICD-10-CM

## 2020-01-01 DIAGNOSIS — Z71.89 OTHER SPECIFIED COUNSELING: ICD-10-CM

## 2020-01-01 DIAGNOSIS — N18.3 CHRONIC KIDNEY DISEASE, STAGE 3 (MODERATE): ICD-10-CM

## 2020-01-01 DIAGNOSIS — B97.21 SARS-ASSOCIATED CORONAVIRUS AS THE CAUSE OF DISEASES CLASSIFIED ELSEWHERE: ICD-10-CM

## 2020-01-01 DIAGNOSIS — Z00.00 ENCOUNTER FOR GENERAL ADULT MEDICAL EXAMINATION W/OUT ABNORMAL FINDINGS: ICD-10-CM

## 2020-01-01 DIAGNOSIS — Z51.5 ENCOUNTER FOR PALLIATIVE CARE: ICD-10-CM

## 2020-01-01 DIAGNOSIS — E87.1 HYPO-OSMOLALITY AND HYPONATREMIA: ICD-10-CM

## 2020-01-01 DIAGNOSIS — Z02.9 ENCOUNTER FOR ADMINISTRATIVE EXAMINATIONS, UNSPECIFIED: ICD-10-CM

## 2020-01-01 DIAGNOSIS — I25.10 ATHEROSCLEROTIC HEART DISEASE OF NATIVE CORONARY ARTERY WITHOUT ANGINA PECTORIS: ICD-10-CM

## 2020-01-01 DIAGNOSIS — Z29.9 ENCOUNTER FOR PROPHYLACTIC MEASURES, UNSPECIFIED: ICD-10-CM

## 2020-01-01 DIAGNOSIS — R53.2 FUNCTIONAL QUADRIPLEGIA: ICD-10-CM

## 2020-01-01 DIAGNOSIS — J96.00 ACUTE RESPIRATORY FAILURE, UNSPECIFIED WHETHER WITH HYPOXIA OR HYPERCAPNIA: ICD-10-CM

## 2020-01-01 DIAGNOSIS — G93.41 METABOLIC ENCEPHALOPATHY: ICD-10-CM

## 2020-01-01 DIAGNOSIS — E78.49 OTHER HYPERLIPIDEMIA: ICD-10-CM

## 2020-01-01 DIAGNOSIS — B34.9 VIRAL INFECTION, UNSPECIFIED: ICD-10-CM

## 2020-01-01 DIAGNOSIS — J18.9 PNEUMONIA, UNSPECIFIED ORGANISM: ICD-10-CM

## 2020-01-01 LAB
4/8 RATIO: 5.7 RATIO — HIGH (ref 0.86–4.14)
ABS CD8: 22 /UL — LOW (ref 90–775)
ALBUMIN SERPL ELPH-MCNC: 3.1 G/DL — LOW (ref 3.3–5)
ALBUMIN SERPL ELPH-MCNC: 3.2 G/DL — LOW (ref 3.3–5)
ALBUMIN SERPL ELPH-MCNC: 3.4 G/DL — SIGNIFICANT CHANGE UP (ref 3.3–5)
ALBUMIN SERPL ELPH-MCNC: 3.5 G/DL — SIGNIFICANT CHANGE UP (ref 3.3–5)
ALBUMIN SERPL ELPH-MCNC: 3.5 G/DL — SIGNIFICANT CHANGE UP (ref 3.3–5)
ALBUMIN SERPL ELPH-MCNC: 3.8 G/DL — SIGNIFICANT CHANGE UP (ref 3.3–5)
ALBUMIN SERPL ELPH-MCNC: 4 G/DL — SIGNIFICANT CHANGE UP (ref 3.3–5)
ALP SERPL-CCNC: 54 U/L — SIGNIFICANT CHANGE UP (ref 40–120)
ALP SERPL-CCNC: 56 U/L — SIGNIFICANT CHANGE UP (ref 40–120)
ALP SERPL-CCNC: 56 U/L — SIGNIFICANT CHANGE UP (ref 40–120)
ALP SERPL-CCNC: 57 U/L — SIGNIFICANT CHANGE UP (ref 40–120)
ALP SERPL-CCNC: 65 U/L — SIGNIFICANT CHANGE UP (ref 40–120)
ALP SERPL-CCNC: 74 U/L — SIGNIFICANT CHANGE UP (ref 40–120)
ALP SERPL-CCNC: 82 U/L — SIGNIFICANT CHANGE UP (ref 40–120)
ALT FLD-CCNC: 51 U/L — HIGH (ref 10–45)
ALT FLD-CCNC: 54 U/L — HIGH (ref 10–45)
ALT FLD-CCNC: 56 U/L — HIGH (ref 10–45)
ALT FLD-CCNC: 66 U/L — HIGH (ref 10–45)
ALT FLD-CCNC: 83 U/L — HIGH (ref 10–45)
ANION GAP SERPL CALC-SCNC: 12 MMOL/L — SIGNIFICANT CHANGE UP (ref 5–17)
ANION GAP SERPL CALC-SCNC: 12 MMOL/L — SIGNIFICANT CHANGE UP (ref 5–17)
ANION GAP SERPL CALC-SCNC: 13 MMOL/L — SIGNIFICANT CHANGE UP (ref 5–17)
ANION GAP SERPL CALC-SCNC: 14 MMOL/L — SIGNIFICANT CHANGE UP (ref 5–17)
ANION GAP SERPL CALC-SCNC: 15 MMOL/L — SIGNIFICANT CHANGE UP (ref 5–17)
ANION GAP SERPL CALC-SCNC: 20 MMOL/L — HIGH (ref 5–17)
APPEARANCE UR: CLEAR — SIGNIFICANT CHANGE UP
APTT BLD: 28.4 SEC — SIGNIFICANT CHANGE UP (ref 27.5–36.3)
APTT BLD: 55.2 SEC — HIGH (ref 27.5–36.3)
AST SERPL-CCNC: 42 U/L — HIGH (ref 10–40)
AST SERPL-CCNC: 46 U/L — HIGH (ref 10–40)
AST SERPL-CCNC: 57 U/L — HIGH (ref 10–40)
AST SERPL-CCNC: 63 U/L — HIGH (ref 10–40)
AST SERPL-CCNC: 65 U/L — HIGH (ref 10–40)
AST SERPL-CCNC: 72 U/L — HIGH (ref 10–40)
AST SERPL-CCNC: 72 U/L — HIGH (ref 10–40)
B PERT DNA SPEC QL NAA+PROBE: SIGNIFICANT CHANGE UP
BACTERIA # UR AUTO: NEGATIVE — SIGNIFICANT CHANGE UP
BASE EXCESS BLDA CALC-SCNC: 1.6 MMOL/L — SIGNIFICANT CHANGE UP (ref -2–2)
BASE EXCESS BLDA CALC-SCNC: 2.3 MMOL/L — HIGH (ref -2–2)
BASE EXCESS BLDV CALC-SCNC: 1.5 MMOL/L — SIGNIFICANT CHANGE UP (ref -2–2)
BASE EXCESS BLDV CALC-SCNC: 2 MMOL/L — SIGNIFICANT CHANGE UP (ref -2–2)
BASOPHILS # BLD AUTO: 0.01 K/UL — SIGNIFICANT CHANGE UP (ref 0–0.2)
BASOPHILS # BLD AUTO: 0.02 K/UL — SIGNIFICANT CHANGE UP (ref 0–0.2)
BASOPHILS # BLD AUTO: 0.02 K/UL — SIGNIFICANT CHANGE UP (ref 0–0.2)
BASOPHILS # BLD AUTO: 0.03 K/UL — SIGNIFICANT CHANGE UP (ref 0–0.2)
BASOPHILS NFR BLD AUTO: 0.2 % — SIGNIFICANT CHANGE UP (ref 0–2)
BASOPHILS NFR BLD AUTO: 0.4 % — SIGNIFICANT CHANGE UP (ref 0–2)
BASOPHILS NFR BLD AUTO: 0.5 % — SIGNIFICANT CHANGE UP (ref 0–2)
BASOPHILS NFR BLD AUTO: 0.9 % — SIGNIFICANT CHANGE UP (ref 0–2)
BILIRUB DIRECT SERPL-MCNC: 0.2 MG/DL — SIGNIFICANT CHANGE UP (ref 0–0.2)
BILIRUB INDIRECT FLD-MCNC: 0.6 MG/DL — SIGNIFICANT CHANGE UP (ref 0.2–1)
BILIRUB SERPL-MCNC: 0.8 MG/DL — SIGNIFICANT CHANGE UP (ref 0.2–1.2)
BILIRUB SERPL-MCNC: 0.9 MG/DL — SIGNIFICANT CHANGE UP (ref 0.2–1.2)
BILIRUB SERPL-MCNC: 1 MG/DL — SIGNIFICANT CHANGE UP (ref 0.2–1.2)
BILIRUB SERPL-MCNC: 1.2 MG/DL — SIGNIFICANT CHANGE UP (ref 0.2–1.2)
BILIRUB SERPL-MCNC: 1.2 MG/DL — SIGNIFICANT CHANGE UP (ref 0.2–1.2)
BILIRUB UR-MCNC: NEGATIVE — SIGNIFICANT CHANGE UP
BUN SERPL-MCNC: 20 MG/DL — SIGNIFICANT CHANGE UP (ref 7–23)
BUN SERPL-MCNC: 22 MG/DL — SIGNIFICANT CHANGE UP (ref 7–23)
BUN SERPL-MCNC: 23 MG/DL — SIGNIFICANT CHANGE UP (ref 7–23)
BUN SERPL-MCNC: 24 MG/DL — HIGH (ref 7–23)
BUN SERPL-MCNC: 24 MG/DL — HIGH (ref 7–23)
BUN SERPL-MCNC: 29 MG/DL — HIGH (ref 7–23)
BUN SERPL-MCNC: 33 MG/DL — HIGH (ref 7–23)
BUN SERPL-MCNC: 33 MG/DL — HIGH (ref 7–23)
BUN SERPL-MCNC: 61 MG/DL — HIGH (ref 7–23)
C PNEUM DNA SPEC QL NAA+PROBE: SIGNIFICANT CHANGE UP
CA-I SERPL-SCNC: 1.1 MMOL/L — LOW (ref 1.12–1.3)
CA-I SERPL-SCNC: 1.12 MMOL/L — SIGNIFICANT CHANGE UP (ref 1.12–1.3)
CALCIUM SERPL-MCNC: 8.5 MG/DL — SIGNIFICANT CHANGE UP (ref 8.4–10.5)
CALCIUM SERPL-MCNC: 8.6 MG/DL — SIGNIFICANT CHANGE UP (ref 8.4–10.5)
CALCIUM SERPL-MCNC: 8.8 MG/DL — SIGNIFICANT CHANGE UP (ref 8.4–10.5)
CALCIUM SERPL-MCNC: 9.1 MG/DL — SIGNIFICANT CHANGE UP (ref 8.4–10.5)
CD3 BLASTS SPEC-ACNC: 146 /UL — LOW (ref 396–2024)
CD3 BLASTS SPEC-ACNC: 51 % — LOW (ref 58–84)
CD4 %: 43 % — SIGNIFICANT CHANGE UP (ref 30–56)
CD8 %: 8 % — LOW (ref 11–43)
CHLORIDE BLDV-SCNC: 100 MMOL/L — SIGNIFICANT CHANGE UP (ref 96–108)
CHLORIDE BLDV-SCNC: 97 MMOL/L — SIGNIFICANT CHANGE UP (ref 96–108)
CHLORIDE SERPL-SCNC: 90 MMOL/L — LOW (ref 96–108)
CHLORIDE SERPL-SCNC: 90 MMOL/L — LOW (ref 96–108)
CHLORIDE SERPL-SCNC: 91 MMOL/L — LOW (ref 96–108)
CHLORIDE SERPL-SCNC: 91 MMOL/L — LOW (ref 96–108)
CHLORIDE SERPL-SCNC: 93 MMOL/L — LOW (ref 96–108)
CHLORIDE SERPL-SCNC: 93 MMOL/L — LOW (ref 96–108)
CHLORIDE SERPL-SCNC: 94 MMOL/L — LOW (ref 96–108)
CHLORIDE SERPL-SCNC: 95 MMOL/L — LOW (ref 96–108)
CHLORIDE SERPL-SCNC: 95 MMOL/L — LOW (ref 96–108)
CK MB BLD-MCNC: 1.5 % — SIGNIFICANT CHANGE UP (ref 0–3.5)
CK MB CFR SERPL CALC: 4.5 NG/ML — SIGNIFICANT CHANGE UP (ref 0–6.7)
CK SERPL-CCNC: 214 U/L — HIGH (ref 30–200)
CK SERPL-CCNC: 292 U/L — HIGH (ref 30–200)
CO2 BLDA-SCNC: 28 MMOL/L — SIGNIFICANT CHANGE UP (ref 22–30)
CO2 BLDA-SCNC: 28 MMOL/L — SIGNIFICANT CHANGE UP (ref 22–30)
CO2 BLDV-SCNC: 27 MMOL/L — SIGNIFICANT CHANGE UP (ref 22–30)
CO2 BLDV-SCNC: 31 MMOL/L — HIGH (ref 22–30)
CO2 SERPL-SCNC: 20 MMOL/L — LOW (ref 22–31)
CO2 SERPL-SCNC: 22 MMOL/L — SIGNIFICANT CHANGE UP (ref 22–31)
CO2 SERPL-SCNC: 24 MMOL/L — SIGNIFICANT CHANGE UP (ref 22–31)
CO2 SERPL-SCNC: 25 MMOL/L — SIGNIFICANT CHANGE UP (ref 22–31)
CO2 SERPL-SCNC: 26 MMOL/L — SIGNIFICANT CHANGE UP (ref 22–31)
CO2 SERPL-SCNC: 27 MMOL/L — SIGNIFICANT CHANGE UP (ref 22–31)
CO2 SERPL-SCNC: 27 MMOL/L — SIGNIFICANT CHANGE UP (ref 22–31)
COLOR SPEC: YELLOW — SIGNIFICANT CHANGE UP
CREAT SERPL-MCNC: 1.12 MG/DL — SIGNIFICANT CHANGE UP (ref 0.5–1.3)
CREAT SERPL-MCNC: 1.18 MG/DL — SIGNIFICANT CHANGE UP (ref 0.5–1.3)
CREAT SERPL-MCNC: 1.18 MG/DL — SIGNIFICANT CHANGE UP (ref 0.5–1.3)
CREAT SERPL-MCNC: 1.22 MG/DL — SIGNIFICANT CHANGE UP (ref 0.5–1.3)
CREAT SERPL-MCNC: 1.24 MG/DL — SIGNIFICANT CHANGE UP (ref 0.5–1.3)
CREAT SERPL-MCNC: 1.31 MG/DL — HIGH (ref 0.5–1.3)
CREAT SERPL-MCNC: 1.43 MG/DL — HIGH (ref 0.5–1.3)
CREAT SERPL-MCNC: 1.67 MG/DL — HIGH (ref 0.5–1.3)
CREAT SERPL-MCNC: 2.7 MG/DL — HIGH (ref 0.5–1.3)
CRP SERPL-MCNC: 10.81 MG/DL — HIGH (ref 0–0.4)
CRP SERPL-MCNC: 2.69 MG/DL — HIGH (ref 0–0.4)
CRP SERPL-MCNC: 2.87 MG/DL — HIGH (ref 0–0.4)
CRP SERPL-MCNC: 9.5 MG/DL — HIGH (ref 0–0.4)
CULTURE RESULTS: SIGNIFICANT CHANGE UP
D DIMER BLD IA.RAPID-MCNC: 1082 NG/ML DDU — HIGH
D DIMER BLD IA.RAPID-MCNC: 1128 NG/ML DDU — HIGH
DIFF PNL FLD: NEGATIVE — SIGNIFICANT CHANGE UP
EOSINOPHIL # BLD AUTO: 0 K/UL — SIGNIFICANT CHANGE UP (ref 0–0.5)
EOSINOPHIL NFR BLD AUTO: 0 % — SIGNIFICANT CHANGE UP (ref 0–6)
EPI CELLS # UR: 1 /HPF — SIGNIFICANT CHANGE UP
ERYTHROCYTE [SEDIMENTATION RATE] IN BLOOD: 64 MM/HR — HIGH (ref 0–20)
FERRITIN SERPL-MCNC: 2164 NG/ML — HIGH (ref 30–400)
FERRITIN SERPL-MCNC: 737 NG/ML — HIGH (ref 30–400)
FLU A RESULT: SIGNIFICANT CHANGE UP
FLU A RESULT: SIGNIFICANT CHANGE UP
FLUAV AG NPH QL: SIGNIFICANT CHANGE UP
FLUAV H1 2009 PAND RNA SPEC QL NAA+PROBE: SIGNIFICANT CHANGE UP
FLUAV H1 RNA SPEC QL NAA+PROBE: SIGNIFICANT CHANGE UP
FLUAV H3 RNA SPEC QL NAA+PROBE: SIGNIFICANT CHANGE UP
FLUAV SUBTYP SPEC NAA+PROBE: SIGNIFICANT CHANGE UP
FLUBV AG NPH QL: SIGNIFICANT CHANGE UP
FLUBV RNA SPEC QL NAA+PROBE: SIGNIFICANT CHANGE UP
GAS PNL BLDA: SIGNIFICANT CHANGE UP
GAS PNL BLDV: 128 MMOL/L — LOW (ref 135–145)
GAS PNL BLDV: 131 MMOL/L — LOW (ref 135–145)
GAS PNL BLDV: SIGNIFICANT CHANGE UP
GIANT PLATELETS BLD QL SMEAR: PRESENT — SIGNIFICANT CHANGE UP
GLUCOSE BLDC GLUCOMTR-MCNC: 119 MG/DL — HIGH (ref 70–99)
GLUCOSE BLDV-MCNC: 112 MG/DL — HIGH (ref 70–99)
GLUCOSE BLDV-MCNC: 126 MG/DL — HIGH (ref 70–99)
GLUCOSE SERPL-MCNC: 103 MG/DL — HIGH (ref 70–99)
GLUCOSE SERPL-MCNC: 107 MG/DL — HIGH (ref 70–99)
GLUCOSE SERPL-MCNC: 109 MG/DL — HIGH (ref 70–99)
GLUCOSE SERPL-MCNC: 111 MG/DL — HIGH (ref 70–99)
GLUCOSE SERPL-MCNC: 125 MG/DL — HIGH (ref 70–99)
GLUCOSE SERPL-MCNC: 133 MG/DL — HIGH (ref 70–99)
GLUCOSE SERPL-MCNC: 148 MG/DL — HIGH (ref 70–99)
GLUCOSE SERPL-MCNC: 170 MG/DL — HIGH (ref 70–99)
GLUCOSE SERPL-MCNC: 96 MG/DL — SIGNIFICANT CHANGE UP (ref 70–99)
GLUCOSE UR QL: ABNORMAL
HADV DNA SPEC QL NAA+PROBE: SIGNIFICANT CHANGE UP
HCO3 BLDA-SCNC: 26 MMOL/L — SIGNIFICANT CHANGE UP (ref 21–29)
HCO3 BLDA-SCNC: 27 MMOL/L — SIGNIFICANT CHANGE UP (ref 21–29)
HCO3 BLDV-SCNC: 26 MMOL/L — SIGNIFICANT CHANGE UP (ref 21–29)
HCO3 BLDV-SCNC: 29 MMOL/L — SIGNIFICANT CHANGE UP (ref 21–29)
HCOV PNL SPEC NAA+PROBE: SIGNIFICANT CHANGE UP
HCT VFR BLD CALC: 40.7 % — SIGNIFICANT CHANGE UP (ref 39–50)
HCT VFR BLD CALC: 42.4 % — SIGNIFICANT CHANGE UP (ref 39–50)
HCT VFR BLD CALC: 43.2 % — SIGNIFICANT CHANGE UP (ref 39–50)
HCT VFR BLD CALC: 43.3 % — SIGNIFICANT CHANGE UP (ref 39–50)
HCT VFR BLD CALC: 43.3 % — SIGNIFICANT CHANGE UP (ref 39–50)
HCT VFR BLD CALC: 43.5 % — SIGNIFICANT CHANGE UP (ref 39–50)
HCT VFR BLD CALC: 44.3 % — SIGNIFICANT CHANGE UP (ref 39–50)
HCT VFR BLD CALC: 45.7 % — SIGNIFICANT CHANGE UP (ref 39–50)
HCT VFR BLD CALC: 47.2 % — SIGNIFICANT CHANGE UP (ref 39–50)
HCT VFR BLDA CALC: 46 % — SIGNIFICANT CHANGE UP (ref 39–50)
HCT VFR BLDA CALC: 48 % — SIGNIFICANT CHANGE UP (ref 39–50)
HGB BLD CALC-MCNC: 15 G/DL — SIGNIFICANT CHANGE UP (ref 13–17)
HGB BLD CALC-MCNC: 15.6 G/DL — SIGNIFICANT CHANGE UP (ref 13–17)
HGB BLD-MCNC: 13.4 G/DL — SIGNIFICANT CHANGE UP (ref 13–17)
HGB BLD-MCNC: 13.8 G/DL — SIGNIFICANT CHANGE UP (ref 13–17)
HGB BLD-MCNC: 14 G/DL — SIGNIFICANT CHANGE UP (ref 13–17)
HGB BLD-MCNC: 14 G/DL — SIGNIFICANT CHANGE UP (ref 13–17)
HGB BLD-MCNC: 14.1 G/DL — SIGNIFICANT CHANGE UP (ref 13–17)
HGB BLD-MCNC: 14.1 G/DL — SIGNIFICANT CHANGE UP (ref 13–17)
HGB BLD-MCNC: 14.3 G/DL — SIGNIFICANT CHANGE UP (ref 13–17)
HGB BLD-MCNC: 14.6 G/DL — SIGNIFICANT CHANGE UP (ref 13–17)
HGB BLD-MCNC: 15.2 G/DL — SIGNIFICANT CHANGE UP (ref 13–17)
HMPV RNA SPEC QL NAA+PROBE: SIGNIFICANT CHANGE UP
HOROWITZ INDEX BLDA+IHG-RTO: 100 — SIGNIFICANT CHANGE UP
HOROWITZ INDEX BLDA+IHG-RTO: 100 — SIGNIFICANT CHANGE UP
HPIV1 RNA SPEC QL NAA+PROBE: SIGNIFICANT CHANGE UP
HPIV2 RNA SPEC QL NAA+PROBE: SIGNIFICANT CHANGE UP
HPIV3 RNA SPEC QL NAA+PROBE: SIGNIFICANT CHANGE UP
HPIV4 RNA SPEC QL NAA+PROBE: SIGNIFICANT CHANGE UP
HYALINE CASTS # UR AUTO: 0 /LPF — SIGNIFICANT CHANGE UP (ref 0–2)
IMM GRANULOCYTES NFR BLD AUTO: 0.8 % — SIGNIFICANT CHANGE UP (ref 0–1.5)
IMM GRANULOCYTES NFR BLD AUTO: 1 % — SIGNIFICANT CHANGE UP (ref 0–1.5)
IMM GRANULOCYTES NFR BLD AUTO: 1.3 % — SIGNIFICANT CHANGE UP (ref 0–1.5)
INR BLD: 1.04 RATIO — SIGNIFICANT CHANGE UP (ref 0.88–1.16)
INR BLD: 1.07 RATIO — SIGNIFICANT CHANGE UP (ref 0.88–1.16)
INR BLD: 1.11 RATIO — SIGNIFICANT CHANGE UP (ref 0.88–1.16)
KETONES UR-MCNC: SIGNIFICANT CHANGE UP
LACTATE BLDV-MCNC: 1.1 MMOL/L — SIGNIFICANT CHANGE UP (ref 0.7–2)
LACTATE BLDV-MCNC: 1.7 MMOL/L — SIGNIFICANT CHANGE UP (ref 0.7–2)
LDH SERPL L TO P-CCNC: 241 U/L — SIGNIFICANT CHANGE UP (ref 50–242)
LDH SERPL L TO P-CCNC: 433 U/L — HIGH (ref 50–242)
LDH SERPL L TO P-CCNC: 550 U/L — HIGH (ref 50–242)
LEGIONELLA AG UR QL: NEGATIVE — SIGNIFICANT CHANGE UP
LEUKOCYTE ESTERASE UR-ACNC: NEGATIVE — SIGNIFICANT CHANGE UP
LYMPHOCYTES # BLD AUTO: 0.12 K/UL — LOW (ref 1–3.3)
LYMPHOCYTES # BLD AUTO: 0.37 K/UL — LOW (ref 1–3.3)
LYMPHOCYTES # BLD AUTO: 0.57 K/UL — LOW (ref 1–3.3)
LYMPHOCYTES # BLD AUTO: 0.71 K/UL — LOW (ref 1–3.3)
LYMPHOCYTES # BLD AUTO: 11.2 % — LOW (ref 13–44)
LYMPHOCYTES # BLD AUTO: 17.8 % — SIGNIFICANT CHANGE UP (ref 13–44)
LYMPHOCYTES # BLD AUTO: 3.5 % — LOW (ref 13–44)
LYMPHOCYTES # BLD AUTO: 6.9 % — LOW (ref 13–44)
MAGNESIUM SERPL-MCNC: 2.2 MG/DL — SIGNIFICANT CHANGE UP (ref 1.6–2.6)
MAGNESIUM SERPL-MCNC: 2.2 MG/DL — SIGNIFICANT CHANGE UP (ref 1.6–2.6)
MAGNESIUM SERPL-MCNC: 2.7 MG/DL — HIGH (ref 1.6–2.6)
MANUAL SMEAR VERIFICATION: SIGNIFICANT CHANGE UP
MCHC RBC-ENTMCNC: 31.4 PG — SIGNIFICANT CHANGE UP (ref 27–34)
MCHC RBC-ENTMCNC: 31.5 PG — SIGNIFICANT CHANGE UP (ref 27–34)
MCHC RBC-ENTMCNC: 31.6 PG — SIGNIFICANT CHANGE UP (ref 27–34)
MCHC RBC-ENTMCNC: 31.7 PG — SIGNIFICANT CHANGE UP (ref 27–34)
MCHC RBC-ENTMCNC: 31.9 GM/DL — LOW (ref 32–36)
MCHC RBC-ENTMCNC: 31.9 GM/DL — LOW (ref 32–36)
MCHC RBC-ENTMCNC: 32 PG — SIGNIFICANT CHANGE UP (ref 27–34)
MCHC RBC-ENTMCNC: 32.1 PG — SIGNIFICANT CHANGE UP (ref 27–34)
MCHC RBC-ENTMCNC: 32.2 GM/DL — SIGNIFICANT CHANGE UP (ref 32–36)
MCHC RBC-ENTMCNC: 32.2 PG — SIGNIFICANT CHANGE UP (ref 27–34)
MCHC RBC-ENTMCNC: 32.3 GM/DL — SIGNIFICANT CHANGE UP (ref 32–36)
MCHC RBC-ENTMCNC: 32.3 GM/DL — SIGNIFICANT CHANGE UP (ref 32–36)
MCHC RBC-ENTMCNC: 32.4 GM/DL — SIGNIFICANT CHANGE UP (ref 32–36)
MCHC RBC-ENTMCNC: 32.6 GM/DL — SIGNIFICANT CHANGE UP (ref 32–36)
MCHC RBC-ENTMCNC: 32.9 GM/DL — SIGNIFICANT CHANGE UP (ref 32–36)
MCHC RBC-ENTMCNC: 33 GM/DL — SIGNIFICANT CHANGE UP (ref 32–36)
MCV RBC AUTO: 96.9 FL — SIGNIFICANT CHANGE UP (ref 80–100)
MCV RBC AUTO: 97.1 FL — SIGNIFICANT CHANGE UP (ref 80–100)
MCV RBC AUTO: 97.3 FL — SIGNIFICANT CHANGE UP (ref 80–100)
MCV RBC AUTO: 97.4 FL — SIGNIFICANT CHANGE UP (ref 80–100)
MCV RBC AUTO: 97.5 FL — SIGNIFICANT CHANGE UP (ref 80–100)
MCV RBC AUTO: 98.3 FL — SIGNIFICANT CHANGE UP (ref 80–100)
MCV RBC AUTO: 98.5 FL — SIGNIFICANT CHANGE UP (ref 80–100)
MCV RBC AUTO: 98.9 FL — SIGNIFICANT CHANGE UP (ref 80–100)
MCV RBC AUTO: 99.1 FL — SIGNIFICANT CHANGE UP (ref 80–100)
MONOCYTES # BLD AUTO: 0.21 K/UL — SIGNIFICANT CHANGE UP (ref 0–0.9)
MONOCYTES # BLD AUTO: 0.28 K/UL — SIGNIFICANT CHANGE UP (ref 0–0.9)
MONOCYTES # BLD AUTO: 0.5 K/UL — SIGNIFICANT CHANGE UP (ref 0–0.9)
MONOCYTES # BLD AUTO: 0.62 K/UL — SIGNIFICANT CHANGE UP (ref 0–0.9)
MONOCYTES NFR BLD AUTO: 12.2 % — SIGNIFICANT CHANGE UP (ref 2–14)
MONOCYTES NFR BLD AUTO: 12.5 % — SIGNIFICANT CHANGE UP (ref 2–14)
MONOCYTES NFR BLD AUTO: 5.3 % — SIGNIFICANT CHANGE UP (ref 2–14)
MONOCYTES NFR BLD AUTO: 6.2 % — SIGNIFICANT CHANGE UP (ref 2–14)
MYELOCYTES NFR BLD: 1.8 % — HIGH (ref 0–0)
NEUTROPHILS # BLD AUTO: 2.74 K/UL — SIGNIFICANT CHANGE UP (ref 1.8–7.4)
NEUTROPHILS # BLD AUTO: 3 K/UL — SIGNIFICANT CHANGE UP (ref 1.8–7.4)
NEUTROPHILS # BLD AUTO: 3.84 K/UL — SIGNIFICANT CHANGE UP (ref 1.8–7.4)
NEUTROPHILS # BLD AUTO: 4.6 K/UL — SIGNIFICANT CHANGE UP (ref 1.8–7.4)
NEUTROPHILS NFR BLD AUTO: 68.4 % — SIGNIFICANT CHANGE UP (ref 43–77)
NEUTROPHILS NFR BLD AUTO: 75.2 % — SIGNIFICANT CHANGE UP (ref 43–77)
NEUTROPHILS NFR BLD AUTO: 76.1 % — SIGNIFICANT CHANGE UP (ref 43–77)
NEUTROPHILS NFR BLD AUTO: 86.3 % — HIGH (ref 43–77)
NEUTS BAND # BLD: 11.5 % — HIGH (ref 0–8)
NITRITE UR-MCNC: NEGATIVE — SIGNIFICANT CHANGE UP
NRBC # BLD: 0 /100 WBCS — SIGNIFICANT CHANGE UP (ref 0–0)
NT-PROBNP SERPL-SCNC: 466 PG/ML — HIGH (ref 0–300)
OSMOLALITY UR: 782 MOSM/KG — SIGNIFICANT CHANGE UP (ref 50–1200)
OTHER CELLS CSF MANUAL: 15 ML/DL — LOW (ref 18–22)
OTHER CELLS CSF MANUAL: 17 ML/DL — LOW (ref 18–22)
PCO2 BLDA: 41 MMHG — SIGNIFICANT CHANGE UP (ref 32–46)
PCO2 BLDA: 48 MMHG — HIGH (ref 32–46)
PCO2 BLDV: 42 MMHG — SIGNIFICANT CHANGE UP (ref 35–50)
PCO2 BLDV: 56 MMHG — HIGH (ref 35–50)
PH BLDA: 7.37 — SIGNIFICANT CHANGE UP (ref 7.35–7.45)
PH BLDA: 7.42 — SIGNIFICANT CHANGE UP (ref 7.35–7.45)
PH BLDV: 7.33 — LOW (ref 7.35–7.45)
PH BLDV: 7.41 — SIGNIFICANT CHANGE UP (ref 7.35–7.45)
PH UR: 6 — SIGNIFICANT CHANGE UP (ref 5–8)
PHOSPHATE SERPL-MCNC: 2.6 MG/DL — SIGNIFICANT CHANGE UP (ref 2.5–4.5)
PHOSPHATE SERPL-MCNC: 3.8 MG/DL — SIGNIFICANT CHANGE UP (ref 2.5–4.5)
PHOSPHATE SERPL-MCNC: 7.5 MG/DL — HIGH (ref 2.5–4.5)
PLAT MORPH BLD: ABNORMAL
PLATELET # BLD AUTO: 100 K/UL — LOW (ref 150–400)
PLATELET # BLD AUTO: 105 K/UL — LOW (ref 150–400)
PLATELET # BLD AUTO: 109 K/UL — LOW (ref 150–400)
PLATELET # BLD AUTO: 113 K/UL — LOW (ref 150–400)
PLATELET # BLD AUTO: 123 K/UL — LOW (ref 150–400)
PLATELET # BLD AUTO: 136 K/UL — LOW (ref 150–400)
PLATELET # BLD AUTO: 168 K/UL — SIGNIFICANT CHANGE UP (ref 150–400)
PLATELET # BLD AUTO: 97 K/UL — LOW (ref 150–400)
PLATELET # BLD AUTO: 98 K/UL — LOW (ref 150–400)
PO2 BLDA: 57 MMHG — LOW (ref 74–108)
PO2 BLDA: 83 MMHG — SIGNIFICANT CHANGE UP (ref 74–108)
PO2 BLDV: 40 MMHG — SIGNIFICANT CHANGE UP (ref 25–45)
PO2 BLDV: 48 MMHG — HIGH (ref 25–45)
POTASSIUM BLDV-SCNC: 4.1 MMOL/L — SIGNIFICANT CHANGE UP (ref 3.5–5.3)
POTASSIUM BLDV-SCNC: 4.2 MMOL/L — SIGNIFICANT CHANGE UP (ref 3.5–5.3)
POTASSIUM SERPL-MCNC: 3.9 MMOL/L — SIGNIFICANT CHANGE UP (ref 3.5–5.3)
POTASSIUM SERPL-MCNC: 4.2 MMOL/L — SIGNIFICANT CHANGE UP (ref 3.5–5.3)
POTASSIUM SERPL-MCNC: 4.5 MMOL/L — SIGNIFICANT CHANGE UP (ref 3.5–5.3)
POTASSIUM SERPL-MCNC: 4.8 MMOL/L — SIGNIFICANT CHANGE UP (ref 3.5–5.3)
POTASSIUM SERPL-MCNC: 4.9 MMOL/L — SIGNIFICANT CHANGE UP (ref 3.5–5.3)
POTASSIUM SERPL-MCNC: 5.7 MMOL/L — HIGH (ref 3.5–5.3)
POTASSIUM SERPL-SCNC: 3.9 MMOL/L — SIGNIFICANT CHANGE UP (ref 3.5–5.3)
POTASSIUM SERPL-SCNC: 4.2 MMOL/L — SIGNIFICANT CHANGE UP (ref 3.5–5.3)
POTASSIUM SERPL-SCNC: 4.5 MMOL/L — SIGNIFICANT CHANGE UP (ref 3.5–5.3)
POTASSIUM SERPL-SCNC: 4.8 MMOL/L — SIGNIFICANT CHANGE UP (ref 3.5–5.3)
POTASSIUM SERPL-SCNC: 4.9 MMOL/L — SIGNIFICANT CHANGE UP (ref 3.5–5.3)
POTASSIUM SERPL-SCNC: 5.7 MMOL/L — HIGH (ref 3.5–5.3)
PROCALCITONIN SERPL-MCNC: 0.27 NG/ML — HIGH (ref 0.02–0.1)
PROCALCITONIN SERPL-MCNC: 0.43 NG/ML — HIGH (ref 0.02–0.1)
PROT SERPL-MCNC: 6.5 G/DL — SIGNIFICANT CHANGE UP (ref 6–8.3)
PROT SERPL-MCNC: 6.6 G/DL — SIGNIFICANT CHANGE UP (ref 6–8.3)
PROT SERPL-MCNC: 6.6 G/DL — SIGNIFICANT CHANGE UP (ref 6–8.3)
PROT SERPL-MCNC: 6.8 G/DL — SIGNIFICANT CHANGE UP (ref 6–8.3)
PROT SERPL-MCNC: 6.8 G/DL — SIGNIFICANT CHANGE UP (ref 6–8.3)
PROT SERPL-MCNC: 7 G/DL — SIGNIFICANT CHANGE UP (ref 6–8.3)
PROT SERPL-MCNC: 7.3 G/DL — SIGNIFICANT CHANGE UP (ref 6–8.3)
PROT UR-MCNC: ABNORMAL
PROTHROM AB SERPL-ACNC: 12 SEC — SIGNIFICANT CHANGE UP (ref 10–12.9)
PROTHROM AB SERPL-ACNC: 12.3 SEC — SIGNIFICANT CHANGE UP (ref 10–12.9)
PROTHROM AB SERPL-ACNC: 12.8 SEC — SIGNIFICANT CHANGE UP (ref 10–12.9)
RAPID RVP RESULT: SIGNIFICANT CHANGE UP
RBC # BLD: 4.18 M/UL — LOW (ref 4.2–5.8)
RBC # BLD: 4.35 M/UL — SIGNIFICANT CHANGE UP (ref 4.2–5.8)
RBC # BLD: 4.37 M/UL — SIGNIFICANT CHANGE UP (ref 4.2–5.8)
RBC # BLD: 4.46 M/UL — SIGNIFICANT CHANGE UP (ref 4.2–5.8)
RBC # BLD: 4.47 M/UL — SIGNIFICANT CHANGE UP (ref 4.2–5.8)
RBC # BLD: 4.64 M/UL — SIGNIFICANT CHANGE UP (ref 4.2–5.8)
RBC # BLD: 4.8 M/UL — SIGNIFICANT CHANGE UP (ref 4.2–5.8)
RBC # FLD: 14 % — SIGNIFICANT CHANGE UP (ref 10.3–14.5)
RBC # FLD: 14.1 % — SIGNIFICANT CHANGE UP (ref 10.3–14.5)
RBC # FLD: 14.2 % — SIGNIFICANT CHANGE UP (ref 10.3–14.5)
RBC # FLD: 14.3 % — SIGNIFICANT CHANGE UP (ref 10.3–14.5)
RBC # FLD: 15 % — HIGH (ref 10.3–14.5)
RBC BLD AUTO: SIGNIFICANT CHANGE UP
RBC CASTS # UR COMP ASSIST: 1 /HPF — SIGNIFICANT CHANGE UP (ref 0–4)
RSV RESULT: SIGNIFICANT CHANGE UP
RSV RNA RESP QL NAA+PROBE: SIGNIFICANT CHANGE UP
RSV RNA SPEC QL NAA+PROBE: SIGNIFICANT CHANGE UP
RV+EV RNA SPEC QL NAA+PROBE: SIGNIFICANT CHANGE UP
SAO2 % BLDA: 89 % — LOW (ref 92–96)
SAO2 % BLDA: 96 % — SIGNIFICANT CHANGE UP (ref 92–96)
SAO2 % BLDV: 72 % — SIGNIFICANT CHANGE UP (ref 67–88)
SAO2 % BLDV: 80 % — SIGNIFICANT CHANGE UP (ref 67–88)
SARS-COV-2 RNA SPEC QL NAA+PROBE: DETECTED
SARS-COV-2 RNA SPEC QL NAA+PROBE: SIGNIFICANT CHANGE UP
SARS-COV-2 RNA SPEC QL NAA+PROBE: SIGNIFICANT CHANGE UP
SODIUM SERPL-SCNC: 128 MMOL/L — LOW (ref 135–145)
SODIUM SERPL-SCNC: 130 MMOL/L — LOW (ref 135–145)
SODIUM SERPL-SCNC: 130 MMOL/L — LOW (ref 135–145)
SODIUM SERPL-SCNC: 131 MMOL/L — LOW (ref 135–145)
SODIUM SERPL-SCNC: 131 MMOL/L — LOW (ref 135–145)
SODIUM SERPL-SCNC: 132 MMOL/L — LOW (ref 135–145)
SODIUM UR-SCNC: <35 MMOL/L — SIGNIFICANT CHANGE UP
SP GR SPEC: 1.02 — SIGNIFICANT CHANGE UP (ref 1.01–1.02)
SPECIMEN SOURCE: SIGNIFICANT CHANGE UP
T-CELL CD4 SUBSET PNL BLD: 125 /UL — LOW (ref 325–1251)
TROPONIN T, HIGH SENSITIVITY RESULT: 34 NG/L — SIGNIFICANT CHANGE UP (ref 0–51)
TROPONIN T, HIGH SENSITIVITY RESULT: 34 NG/L — SIGNIFICANT CHANGE UP (ref 0–51)
UROBILINOGEN FLD QL: NEGATIVE — SIGNIFICANT CHANGE UP
WBC # BLD: 3.42 K/UL — LOW (ref 3.8–10.5)
WBC # BLD: 4 K/UL — SIGNIFICANT CHANGE UP (ref 3.8–10.5)
WBC # BLD: 4.01 K/UL — SIGNIFICANT CHANGE UP (ref 3.8–10.5)
WBC # BLD: 4.05 K/UL — SIGNIFICANT CHANGE UP (ref 3.8–10.5)
WBC # BLD: 4.36 K/UL — SIGNIFICANT CHANGE UP (ref 3.8–10.5)
WBC # BLD: 4.76 K/UL — SIGNIFICANT CHANGE UP (ref 3.8–10.5)
WBC # BLD: 5.1 K/UL — SIGNIFICANT CHANGE UP (ref 3.8–10.5)
WBC # BLD: 5.33 K/UL — SIGNIFICANT CHANGE UP (ref 3.8–10.5)
WBC # BLD: 8.12 K/UL — SIGNIFICANT CHANGE UP (ref 3.8–10.5)
WBC # FLD AUTO: 3.42 K/UL — LOW (ref 3.8–10.5)
WBC # FLD AUTO: 4 K/UL — SIGNIFICANT CHANGE UP (ref 3.8–10.5)
WBC # FLD AUTO: 4.01 K/UL — SIGNIFICANT CHANGE UP (ref 3.8–10.5)
WBC # FLD AUTO: 4.05 K/UL — SIGNIFICANT CHANGE UP (ref 3.8–10.5)
WBC # FLD AUTO: 4.36 K/UL — SIGNIFICANT CHANGE UP (ref 3.8–10.5)
WBC # FLD AUTO: 4.76 K/UL — SIGNIFICANT CHANGE UP (ref 3.8–10.5)
WBC # FLD AUTO: 5.1 K/UL — SIGNIFICANT CHANGE UP (ref 3.8–10.5)
WBC # FLD AUTO: 5.33 K/UL — SIGNIFICANT CHANGE UP (ref 3.8–10.5)
WBC # FLD AUTO: 8.12 K/UL — SIGNIFICANT CHANGE UP (ref 3.8–10.5)
WBC UR QL: 1 /HPF — SIGNIFICANT CHANGE UP (ref 0–5)

## 2020-01-01 PROCEDURE — 93010 ELECTROCARDIOGRAM REPORT: CPT

## 2020-01-01 PROCEDURE — 71045 X-RAY EXAM CHEST 1 VIEW: CPT | Mod: 26

## 2020-01-01 PROCEDURE — 99497 ADVNCD CARE PLAN 30 MIN: CPT

## 2020-01-01 PROCEDURE — 99223 1ST HOSP IP/OBS HIGH 75: CPT

## 2020-01-01 PROCEDURE — 99285 EMERGENCY DEPT VISIT HI MDM: CPT

## 2020-01-01 PROCEDURE — 36620 INSERTION CATHETER ARTERY: CPT

## 2020-01-01 PROCEDURE — 99232 SBSQ HOSP IP/OBS MODERATE 35: CPT

## 2020-01-01 PROCEDURE — 99397 PER PM REEVAL EST PAT 65+ YR: CPT

## 2020-01-01 PROCEDURE — 71260 CT THORAX DX C+: CPT | Mod: 26

## 2020-01-01 PROCEDURE — 71045 X-RAY EXAM CHEST 1 VIEW: CPT | Mod: 26,77

## 2020-01-01 PROCEDURE — 99291 CRITICAL CARE FIRST HOUR: CPT

## 2020-01-01 PROCEDURE — 99233 SBSQ HOSP IP/OBS HIGH 50: CPT

## 2020-01-01 PROCEDURE — 99223 1ST HOSP IP/OBS HIGH 75: CPT | Mod: GC

## 2020-01-01 PROCEDURE — 74177 CT ABD & PELVIS W/CONTRAST: CPT | Mod: 26

## 2020-01-01 PROCEDURE — 99497 ADVNCD CARE PLAN 30 MIN: CPT | Mod: 25

## 2020-01-01 PROCEDURE — 36556 INSERT NON-TUNNEL CV CATH: CPT

## 2020-01-01 RX ORDER — ATORVASTATIN CALCIUM 80 MG/1
80 TABLET, FILM COATED ORAL AT BEDTIME
Refills: 0 | Status: DISCONTINUED | OUTPATIENT
Start: 2020-01-01 | End: 2020-01-01

## 2020-01-01 RX ORDER — ACETAMINOPHEN 500 MG
975 TABLET ORAL ONCE
Refills: 0 | Status: COMPLETED | OUTPATIENT
Start: 2020-01-01 | End: 2020-01-01

## 2020-01-01 RX ORDER — HEPARIN SODIUM 5000 [USP'U]/ML
5000 INJECTION INTRAVENOUS; SUBCUTANEOUS EVERY 12 HOURS
Refills: 0 | Status: DISCONTINUED | OUTPATIENT
Start: 2020-01-01 | End: 2020-01-01

## 2020-01-01 RX ORDER — FENTANYL CITRATE 50 UG/ML
100 INJECTION INTRAVENOUS ONCE
Refills: 0 | Status: DISCONTINUED | OUTPATIENT
Start: 2020-01-01 | End: 2020-01-01

## 2020-01-01 RX ORDER — FUROSEMIDE 40 MG
20 TABLET ORAL ONCE
Refills: 0 | Status: DISCONTINUED | OUTPATIENT
Start: 2020-01-01 | End: 2020-01-01

## 2020-01-01 RX ORDER — AZITHROMYCIN 500 MG/1
500 TABLET, FILM COATED ORAL ONCE
Refills: 0 | Status: COMPLETED | OUTPATIENT
Start: 2020-01-01 | End: 2020-01-01

## 2020-01-01 RX ORDER — ALBUTEROL 90 UG/1
2 AEROSOL, METERED ORAL
Qty: 0 | Refills: 0 | DISCHARGE

## 2020-01-01 RX ORDER — HEPARIN SODIUM 5000 [USP'U]/ML
7500 INJECTION INTRAVENOUS; SUBCUTANEOUS EVERY 8 HOURS
Refills: 0 | Status: DISCONTINUED | OUTPATIENT
Start: 2020-01-01 | End: 2020-01-01

## 2020-01-01 RX ORDER — CHLORHEXIDINE GLUCONATE 213 G/1000ML
15 SOLUTION TOPICAL EVERY 12 HOURS
Refills: 0 | Status: DISCONTINUED | OUTPATIENT
Start: 2020-01-01 | End: 2020-01-01

## 2020-01-01 RX ORDER — PROPOFOL 10 MG/ML
75 INJECTION, EMULSION INTRAVENOUS
Qty: 1000 | Refills: 0 | Status: DISCONTINUED | OUTPATIENT
Start: 2020-01-01 | End: 2020-01-01

## 2020-01-01 RX ORDER — ACETAMINOPHEN 500 MG
650 TABLET ORAL ONCE
Refills: 0 | Status: COMPLETED | OUTPATIENT
Start: 2020-01-01 | End: 2020-01-01

## 2020-01-01 RX ORDER — SODIUM ZIRCONIUM CYCLOSILICATE 10 G/10G
10 POWDER, FOR SUSPENSION ORAL ONCE
Refills: 0 | Status: COMPLETED | OUTPATIENT
Start: 2020-01-01 | End: 2020-01-01

## 2020-01-01 RX ORDER — FENTANYL CITRATE 50 UG/ML
0.5 INJECTION INTRAVENOUS
Qty: 2500 | Refills: 0 | Status: DISCONTINUED | OUTPATIENT
Start: 2020-01-01 | End: 2020-01-01

## 2020-01-01 RX ORDER — SODIUM CHLORIDE 9 MG/ML
2500 INJECTION, SOLUTION INTRAVENOUS ONCE
Refills: 0 | Status: COMPLETED | OUTPATIENT
Start: 2020-01-01 | End: 2020-01-01

## 2020-01-01 RX ORDER — FENTANYL CITRATE 50 UG/ML
0.5 INJECTION INTRAVENOUS
Qty: 5000 | Refills: 0 | Status: DISCONTINUED | OUTPATIENT
Start: 2020-01-01 | End: 2020-01-01

## 2020-01-01 RX ORDER — FAMOTIDINE 10 MG/ML
20 INJECTION INTRAVENOUS DAILY
Refills: 0 | Status: DISCONTINUED | OUTPATIENT
Start: 2020-01-01 | End: 2020-01-01

## 2020-01-01 RX ORDER — DILTIAZEM HCL 120 MG
180 CAPSULE, EXT RELEASE 24 HR ORAL DAILY
Refills: 0 | Status: DISCONTINUED | OUTPATIENT
Start: 2020-01-01 | End: 2020-01-01

## 2020-01-01 RX ORDER — HYDROXYCHLOROQUINE SULFATE 200 MG
200 TABLET ORAL
Refills: 0 | Status: DISCONTINUED | OUTPATIENT
Start: 2020-01-01 | End: 2020-01-01

## 2020-01-01 RX ORDER — ROFLUMILAST 500 UG/1
500 TABLET ORAL DAILY
Refills: 0 | Status: DISCONTINUED | OUTPATIENT
Start: 2020-01-01 | End: 2020-01-01

## 2020-01-01 RX ORDER — ACETAMINOPHEN 500 MG
650 TABLET ORAL EVERY 6 HOURS
Refills: 0 | Status: DISCONTINUED | OUTPATIENT
Start: 2020-01-01 | End: 2020-01-01

## 2020-01-01 RX ORDER — BUDESONIDE AND FORMOTEROL FUMARATE DIHYDRATE 160; 4.5 UG/1; UG/1
2 AEROSOL RESPIRATORY (INHALATION)
Refills: 0 | Status: DISCONTINUED | OUTPATIENT
Start: 2020-01-01 | End: 2020-01-01

## 2020-01-01 RX ORDER — TAMSULOSIN HYDROCHLORIDE 0.4 MG/1
0.4 CAPSULE ORAL AT BEDTIME
Refills: 0 | Status: DISCONTINUED | OUTPATIENT
Start: 2020-01-01 | End: 2020-01-01

## 2020-01-01 RX ORDER — OMEPRAZOLE 10 MG/1
1 CAPSULE, DELAYED RELEASE ORAL
Qty: 0 | Refills: 0 | DISCHARGE

## 2020-01-01 RX ORDER — ALBUTEROL 90 UG/1
2 AEROSOL, METERED ORAL EVERY 6 HOURS
Refills: 0 | Status: DISCONTINUED | OUTPATIENT
Start: 2020-01-01 | End: 2020-01-01

## 2020-01-01 RX ORDER — UMECLIDINIUM BROMIDE AND VILANTEROL TRIFENATATE 62.5; 25 UG/1; UG/1
1 POWDER RESPIRATORY (INHALATION)
Qty: 0 | Refills: 0 | DISCHARGE

## 2020-01-01 RX ORDER — BUPROPION HYDROCHLORIDE 150 MG/1
150 TABLET, EXTENDED RELEASE ORAL DAILY
Refills: 0 | Status: DISCONTINUED | OUTPATIENT
Start: 2020-01-01 | End: 2020-01-01

## 2020-01-01 RX ORDER — IPRATROPIUM BROMIDE 0.2 MG/ML
1 SOLUTION, NON-ORAL INHALATION EVERY 6 HOURS
Refills: 0 | Status: DISCONTINUED | OUTPATIENT
Start: 2020-01-01 | End: 2020-01-01

## 2020-01-01 RX ORDER — HYDROXYCHLOROQUINE SULFATE 200 MG
400 TABLET ORAL
Refills: 0 | Status: COMPLETED | OUTPATIENT
Start: 2020-01-01 | End: 2020-01-01

## 2020-01-01 RX ORDER — PIPERACILLIN AND TAZOBACTAM 4; .5 G/20ML; G/20ML
3.38 INJECTION, POWDER, LYOPHILIZED, FOR SOLUTION INTRAVENOUS ONCE
Refills: 0 | Status: COMPLETED | OUTPATIENT
Start: 2020-01-01 | End: 2020-01-01

## 2020-01-01 RX ORDER — EZETIMIBE 10 MG/1
1 TABLET ORAL
Qty: 0 | Refills: 0 | DISCHARGE

## 2020-01-01 RX ORDER — NOREPINEPHRINE BITARTRATE/D5W 8 MG/250ML
0.1 PLASTIC BAG, INJECTION (ML) INTRAVENOUS
Qty: 8 | Refills: 0 | Status: DISCONTINUED | OUTPATIENT
Start: 2020-01-01 | End: 2020-01-01

## 2020-01-01 RX ORDER — FENTANYL CITRATE 50 UG/ML
50 INJECTION INTRAVENOUS ONCE
Refills: 0 | Status: DISCONTINUED | OUTPATIENT
Start: 2020-01-01 | End: 2020-01-01

## 2020-01-01 RX ORDER — AZITHROMYCIN 500 MG/1
TABLET, FILM COATED ORAL
Refills: 0 | Status: DISCONTINUED | OUTPATIENT
Start: 2020-01-01 | End: 2020-01-01

## 2020-01-01 RX ORDER — SODIUM CHLORIDE 9 MG/ML
4100 INJECTION, SOLUTION INTRAVENOUS ONCE
Refills: 0 | Status: DISCONTINUED | OUTPATIENT
Start: 2020-01-01 | End: 2020-01-01

## 2020-01-01 RX ORDER — TAMSULOSIN HYDROCHLORIDE 0.4 MG/1
1 CAPSULE ORAL
Qty: 0 | Refills: 0 | DISCHARGE

## 2020-01-01 RX ORDER — DILTIAZEM HCL 120 MG
1 CAPSULE, EXT RELEASE 24 HR ORAL
Qty: 0 | Refills: 0 | DISCHARGE

## 2020-01-01 RX ORDER — TIOTROPIUM BROMIDE 18 UG/1
1 CAPSULE ORAL; RESPIRATORY (INHALATION) DAILY
Refills: 0 | Status: DISCONTINUED | OUTPATIENT
Start: 2020-01-01 | End: 2020-01-01

## 2020-01-01 RX ORDER — ASPIRIN/CALCIUM CARB/MAGNESIUM 324 MG
81 TABLET ORAL DAILY
Refills: 0 | Status: DISCONTINUED | OUTPATIENT
Start: 2020-01-01 | End: 2020-01-01

## 2020-01-01 RX ORDER — FAMOTIDINE 10 MG/ML
20 INJECTION INTRAVENOUS
Refills: 0 | Status: DISCONTINUED | OUTPATIENT
Start: 2020-01-01 | End: 2020-01-01

## 2020-01-01 RX ORDER — VANCOMYCIN HCL 1 G
1250 VIAL (EA) INTRAVENOUS EVERY 12 HOURS
Refills: 0 | Status: DISCONTINUED | OUTPATIENT
Start: 2020-01-01 | End: 2020-01-01

## 2020-01-01 RX ORDER — IPRATROPIUM/ALBUTEROL SULFATE 18-103MCG
3 AEROSOL WITH ADAPTER (GRAM) INHALATION EVERY 6 HOURS
Refills: 0 | Status: DISCONTINUED | OUTPATIENT
Start: 2020-01-01 | End: 2020-01-01

## 2020-01-01 RX ORDER — LOSARTAN POTASSIUM 100 MG/1
50 TABLET, FILM COATED ORAL DAILY
Refills: 0 | Status: DISCONTINUED | OUTPATIENT
Start: 2020-01-01 | End: 2020-01-01

## 2020-01-01 RX ORDER — ATORVASTATIN CALCIUM 80 MG/1
1 TABLET, FILM COATED ORAL
Qty: 0 | Refills: 0 | DISCHARGE

## 2020-01-01 RX ORDER — AZITHROMYCIN 500 MG/1
500 TABLET, FILM COATED ORAL EVERY 24 HOURS
Refills: 0 | Status: DISCONTINUED | OUTPATIENT
Start: 2020-01-01 | End: 2020-01-01

## 2020-01-01 RX ORDER — PANTOPRAZOLE SODIUM 20 MG/1
40 TABLET, DELAYED RELEASE ORAL DAILY
Refills: 0 | Status: DISCONTINUED | OUTPATIENT
Start: 2020-01-01 | End: 2020-01-01

## 2020-01-01 RX ORDER — PANTOPRAZOLE SODIUM 20 MG/1
40 TABLET, DELAYED RELEASE ORAL
Refills: 0 | Status: DISCONTINUED | OUTPATIENT
Start: 2020-01-01 | End: 2020-01-01

## 2020-01-01 RX ORDER — PIPERACILLIN AND TAZOBACTAM 4; .5 G/20ML; G/20ML
3.38 INJECTION, POWDER, LYOPHILIZED, FOR SOLUTION INTRAVENOUS EVERY 8 HOURS
Refills: 0 | Status: DISCONTINUED | OUTPATIENT
Start: 2020-01-01 | End: 2020-01-01

## 2020-01-01 RX ORDER — CEFTRIAXONE 500 MG/1
1000 INJECTION, POWDER, FOR SOLUTION INTRAMUSCULAR; INTRAVENOUS EVERY 24 HOURS
Refills: 0 | Status: DISCONTINUED | OUTPATIENT
Start: 2020-01-01 | End: 2020-01-01

## 2020-01-01 RX ORDER — CEFTRIAXONE 500 MG/1
1000 INJECTION, POWDER, FOR SOLUTION INTRAMUSCULAR; INTRAVENOUS ONCE
Refills: 0 | Status: COMPLETED | OUTPATIENT
Start: 2020-01-01 | End: 2020-01-01

## 2020-01-01 RX ORDER — MIDAZOLAM HYDROCHLORIDE 1 MG/ML
4 INJECTION, SOLUTION INTRAMUSCULAR; INTRAVENOUS ONCE
Refills: 0 | Status: DISCONTINUED | OUTPATIENT
Start: 2020-01-01 | End: 2020-01-01

## 2020-01-01 RX ORDER — ACETAMINOPHEN 500 MG
650 TABLET ORAL ONCE
Refills: 0 | Status: DISCONTINUED | OUTPATIENT
Start: 2020-01-01 | End: 2020-01-01

## 2020-01-01 RX ORDER — VASOPRESSIN 20 [USP'U]/ML
0.1 INJECTION INTRAVENOUS
Qty: 50 | Refills: 0 | Status: DISCONTINUED | OUTPATIENT
Start: 2020-01-01 | End: 2020-01-01

## 2020-01-01 RX ORDER — BUPROPION HYDROCHLORIDE 150 MG/1
1 TABLET, EXTENDED RELEASE ORAL
Qty: 0 | Refills: 0 | DISCHARGE

## 2020-01-01 RX ORDER — ALBUTEROL 90 UG/1
2 AEROSOL, METERED ORAL EVERY 4 HOURS
Refills: 0 | Status: DISCONTINUED | OUTPATIENT
Start: 2020-01-01 | End: 2020-01-01

## 2020-01-01 RX ADMIN — ROFLUMILAST 500 MICROGRAM(S): 500 TABLET ORAL at 15:07

## 2020-01-01 RX ADMIN — Medication 200 MILLIGRAM(S): at 06:10

## 2020-01-01 RX ADMIN — ATORVASTATIN CALCIUM 80 MILLIGRAM(S): 80 TABLET, FILM COATED ORAL at 21:10

## 2020-01-01 RX ADMIN — ALBUTEROL 2 PUFF(S): 90 AEROSOL, METERED ORAL at 17:09

## 2020-01-01 RX ADMIN — CEFTRIAXONE 100 MILLIGRAM(S): 500 INJECTION, POWDER, FOR SOLUTION INTRAMUSCULAR; INTRAVENOUS at 14:13

## 2020-01-01 RX ADMIN — ATORVASTATIN CALCIUM 80 MILLIGRAM(S): 80 TABLET, FILM COATED ORAL at 21:09

## 2020-01-01 RX ADMIN — BUDESONIDE AND FORMOTEROL FUMARATE DIHYDRATE 2 PUFF(S): 160; 4.5 AEROSOL RESPIRATORY (INHALATION) at 18:26

## 2020-01-01 RX ADMIN — PIPERACILLIN AND TAZOBACTAM 25 GRAM(S): 4; .5 INJECTION, POWDER, LYOPHILIZED, FOR SOLUTION INTRAVENOUS at 13:31

## 2020-01-01 RX ADMIN — BUDESONIDE AND FORMOTEROL FUMARATE DIHYDRATE 2 PUFF(S): 160; 4.5 AEROSOL RESPIRATORY (INHALATION) at 17:09

## 2020-01-01 RX ADMIN — TAMSULOSIN HYDROCHLORIDE 0.4 MILLIGRAM(S): 0.4 CAPSULE ORAL at 21:26

## 2020-01-01 RX ADMIN — FENTANYL CITRATE 3.11 MICROGRAM(S)/KG/HR: 50 INJECTION INTRAVENOUS at 04:15

## 2020-01-01 RX ADMIN — CEFTRIAXONE 100 MILLIGRAM(S): 500 INJECTION, POWDER, FOR SOLUTION INTRAMUSCULAR; INTRAVENOUS at 13:54

## 2020-01-01 RX ADMIN — SODIUM CHLORIDE 2500 MILLILITER(S): 9 INJECTION, SOLUTION INTRAVENOUS at 11:55

## 2020-01-01 RX ADMIN — VASOPRESSIN 6 UNIT(S)/MIN: 20 INJECTION INTRAVENOUS at 09:44

## 2020-01-01 RX ADMIN — Medication 40 MILLIGRAM(S): at 16:34

## 2020-01-01 RX ADMIN — Medication 20 MILLIGRAM(S): at 17:11

## 2020-01-01 RX ADMIN — CHLORHEXIDINE GLUCONATE 15 MILLILITER(S): 213 SOLUTION TOPICAL at 17:30

## 2020-01-01 RX ADMIN — Medication 650 MILLIGRAM(S): at 20:15

## 2020-01-01 RX ADMIN — HEPARIN SODIUM 7500 UNIT(S): 5000 INJECTION INTRAVENOUS; SUBCUTANEOUS at 21:09

## 2020-01-01 RX ADMIN — PANTOPRAZOLE SODIUM 40 MILLIGRAM(S): 20 TABLET, DELAYED RELEASE ORAL at 05:49

## 2020-01-01 RX ADMIN — Medication 81 MILLIGRAM(S): at 13:54

## 2020-01-01 RX ADMIN — Medication 81 MILLIGRAM(S): at 12:56

## 2020-01-01 RX ADMIN — HEPARIN SODIUM 7500 UNIT(S): 5000 INJECTION INTRAVENOUS; SUBCUTANEOUS at 05:24

## 2020-01-01 RX ADMIN — HEPARIN SODIUM 5000 UNIT(S): 5000 INJECTION INTRAVENOUS; SUBCUTANEOUS at 05:11

## 2020-01-01 RX ADMIN — ATORVASTATIN CALCIUM 80 MILLIGRAM(S): 80 TABLET, FILM COATED ORAL at 20:15

## 2020-01-01 RX ADMIN — Medication 166.67 MILLIGRAM(S): at 17:11

## 2020-01-01 RX ADMIN — HEPARIN SODIUM 5000 UNIT(S): 5000 INJECTION INTRAVENOUS; SUBCUTANEOUS at 05:17

## 2020-01-01 RX ADMIN — CEFTRIAXONE 100 MILLIGRAM(S): 500 INJECTION, POWDER, FOR SOLUTION INTRAMUSCULAR; INTRAVENOUS at 12:16

## 2020-01-01 RX ADMIN — LOSARTAN POTASSIUM 50 MILLIGRAM(S): 100 TABLET, FILM COATED ORAL at 05:16

## 2020-01-01 RX ADMIN — Medication 650 MILLIGRAM(S): at 18:59

## 2020-01-01 RX ADMIN — HEPARIN SODIUM 7500 UNIT(S): 5000 INJECTION INTRAVENOUS; SUBCUTANEOUS at 21:10

## 2020-01-01 RX ADMIN — SODIUM ZIRCONIUM CYCLOSILICATE 10 GRAM(S): 10 POWDER, FOR SUSPENSION ORAL at 05:18

## 2020-01-01 RX ADMIN — BUPROPION HYDROCHLORIDE 150 MILLIGRAM(S): 150 TABLET, EXTENDED RELEASE ORAL at 12:56

## 2020-01-01 RX ADMIN — TAMSULOSIN HYDROCHLORIDE 0.4 MILLIGRAM(S): 0.4 CAPSULE ORAL at 20:15

## 2020-01-01 RX ADMIN — TAMSULOSIN HYDROCHLORIDE 0.4 MILLIGRAM(S): 0.4 CAPSULE ORAL at 21:09

## 2020-01-01 RX ADMIN — Medication 200 MILLIGRAM(S): at 17:17

## 2020-01-01 RX ADMIN — HEPARIN SODIUM 7500 UNIT(S): 5000 INJECTION INTRAVENOUS; SUBCUTANEOUS at 13:31

## 2020-01-01 RX ADMIN — HEPARIN SODIUM 5000 UNIT(S): 5000 INJECTION INTRAVENOUS; SUBCUTANEOUS at 05:49

## 2020-01-01 RX ADMIN — Medication 200 MILLIGRAM(S): at 06:43

## 2020-01-01 RX ADMIN — ALBUTEROL 2 PUFF(S): 90 AEROSOL, METERED ORAL at 13:14

## 2020-01-01 RX ADMIN — ATORVASTATIN CALCIUM 80 MILLIGRAM(S): 80 TABLET, FILM COATED ORAL at 21:28

## 2020-01-01 RX ADMIN — AZITHROMYCIN 250 MILLIGRAM(S): 500 TABLET, FILM COATED ORAL at 05:15

## 2020-01-01 RX ADMIN — Medication 180 MILLIGRAM(S): at 05:11

## 2020-01-01 RX ADMIN — Medication 200 MILLIGRAM(S): at 05:11

## 2020-01-01 RX ADMIN — Medication 650 MILLIGRAM(S): at 19:29

## 2020-01-01 RX ADMIN — BUDESONIDE AND FORMOTEROL FUMARATE DIHYDRATE 2 PUFF(S): 160; 4.5 AEROSOL RESPIRATORY (INHALATION) at 05:10

## 2020-01-01 RX ADMIN — Medication 975 MILLIGRAM(S): at 13:21

## 2020-01-01 RX ADMIN — BUDESONIDE AND FORMOTEROL FUMARATE DIHYDRATE 2 PUFF(S): 160; 4.5 AEROSOL RESPIRATORY (INHALATION) at 17:17

## 2020-01-01 RX ADMIN — Medication 180 MILLIGRAM(S): at 05:17

## 2020-01-01 RX ADMIN — PIPERACILLIN AND TAZOBACTAM 200 GRAM(S): 4; .5 INJECTION, POWDER, LYOPHILIZED, FOR SOLUTION INTRAVENOUS at 13:13

## 2020-01-01 RX ADMIN — PANTOPRAZOLE SODIUM 40 MILLIGRAM(S): 20 TABLET, DELAYED RELEASE ORAL at 05:09

## 2020-01-01 RX ADMIN — ROFLUMILAST 500 MICROGRAM(S): 500 TABLET ORAL at 21:09

## 2020-01-01 RX ADMIN — Medication 180 MILLIGRAM(S): at 05:49

## 2020-01-01 RX ADMIN — ALBUTEROL 2 PUFF(S): 90 AEROSOL, METERED ORAL at 23:38

## 2020-01-01 RX ADMIN — BUDESONIDE AND FORMOTEROL FUMARATE DIHYDRATE 2 PUFF(S): 160; 4.5 AEROSOL RESPIRATORY (INHALATION) at 05:30

## 2020-01-01 RX ADMIN — FENTANYL CITRATE 50 MICROGRAM(S): 50 INJECTION INTRAVENOUS at 03:15

## 2020-01-01 RX ADMIN — AZITHROMYCIN 250 MILLIGRAM(S): 500 TABLET, FILM COATED ORAL at 05:22

## 2020-01-01 RX ADMIN — Medication 166.67 MILLIGRAM(S): at 06:11

## 2020-01-01 RX ADMIN — Medication 23.3 MICROGRAM(S)/KG/MIN: at 02:45

## 2020-01-01 RX ADMIN — Medication 20 MILLIGRAM(S): at 23:09

## 2020-01-01 RX ADMIN — Medication 3 MILLILITER(S): at 17:20

## 2020-01-01 RX ADMIN — HEPARIN SODIUM 7500 UNIT(S): 5000 INJECTION INTRAVENOUS; SUBCUTANEOUS at 05:23

## 2020-01-01 RX ADMIN — Medication 81 MILLIGRAM(S): at 12:41

## 2020-01-01 RX ADMIN — TAMSULOSIN HYDROCHLORIDE 0.4 MILLIGRAM(S): 0.4 CAPSULE ORAL at 21:11

## 2020-01-01 RX ADMIN — FAMOTIDINE 100 MILLIGRAM(S): 10 INJECTION INTRAVENOUS at 05:17

## 2020-01-01 RX ADMIN — Medication 81 MILLIGRAM(S): at 14:11

## 2020-01-01 RX ADMIN — PROPOFOL 56 MICROGRAM(S)/KG/MIN: 10 INJECTION, EMULSION INTRAVENOUS at 02:45

## 2020-01-01 RX ADMIN — BUPROPION HYDROCHLORIDE 150 MILLIGRAM(S): 150 TABLET, EXTENDED RELEASE ORAL at 14:13

## 2020-01-01 RX ADMIN — Medication 3 MILLILITER(S): at 23:09

## 2020-01-01 RX ADMIN — FENTANYL CITRATE 50 MICROGRAM(S): 50 INJECTION INTRAVENOUS at 02:45

## 2020-01-01 RX ADMIN — ATORVASTATIN CALCIUM 80 MILLIGRAM(S): 80 TABLET, FILM COATED ORAL at 21:11

## 2020-01-01 RX ADMIN — LOSARTAN POTASSIUM 50 MILLIGRAM(S): 100 TABLET, FILM COATED ORAL at 05:30

## 2020-01-01 RX ADMIN — Medication 200 MILLIGRAM(S): at 17:29

## 2020-01-01 RX ADMIN — Medication 650 MILLIGRAM(S): at 23:11

## 2020-01-01 RX ADMIN — Medication 180 MILLIGRAM(S): at 05:15

## 2020-01-01 RX ADMIN — FAMOTIDINE 100 MILLIGRAM(S): 10 INJECTION INTRAVENOUS at 10:11

## 2020-01-01 RX ADMIN — HEPARIN SODIUM 5000 UNIT(S): 5000 INJECTION INTRAVENOUS; SUBCUTANEOUS at 17:17

## 2020-01-01 RX ADMIN — ALBUTEROL 2 PUFF(S): 90 AEROSOL, METERED ORAL at 06:31

## 2020-01-01 RX ADMIN — CEFTRIAXONE 100 MILLIGRAM(S): 500 INJECTION, POWDER, FOR SOLUTION INTRAMUSCULAR; INTRAVENOUS at 13:35

## 2020-01-01 RX ADMIN — AZITHROMYCIN 250 MILLIGRAM(S): 500 TABLET, FILM COATED ORAL at 13:21

## 2020-01-01 RX ADMIN — VASOPRESSIN 6 UNIT(S)/MIN: 20 INJECTION INTRAVENOUS at 21:10

## 2020-01-01 RX ADMIN — ALBUTEROL 2 PUFF(S): 90 AEROSOL, METERED ORAL at 03:12

## 2020-01-01 RX ADMIN — HEPARIN SODIUM 7500 UNIT(S): 5000 INJECTION INTRAVENOUS; SUBCUTANEOUS at 15:08

## 2020-01-01 RX ADMIN — BUPROPION HYDROCHLORIDE 150 MILLIGRAM(S): 150 TABLET, EXTENDED RELEASE ORAL at 12:41

## 2020-01-01 RX ADMIN — Medication 400 MILLIGRAM(S): at 18:26

## 2020-01-01 RX ADMIN — Medication 650 MILLIGRAM(S): at 22:41

## 2020-01-01 RX ADMIN — FAMOTIDINE 100 MILLIGRAM(S): 10 INJECTION INTRAVENOUS at 17:30

## 2020-01-01 RX ADMIN — Medication 400 MILLIGRAM(S): at 05:21

## 2020-01-01 RX ADMIN — PIPERACILLIN AND TAZOBACTAM 25 GRAM(S): 4; .5 INJECTION, POWDER, LYOPHILIZED, FOR SOLUTION INTRAVENOUS at 06:11

## 2020-01-01 RX ADMIN — CHLORHEXIDINE GLUCONATE 15 MILLILITER(S): 213 SOLUTION TOPICAL at 05:23

## 2020-01-01 RX ADMIN — PANTOPRAZOLE SODIUM 40 MILLIGRAM(S): 20 TABLET, DELAYED RELEASE ORAL at 05:30

## 2020-01-01 RX ADMIN — LOSARTAN POTASSIUM 50 MILLIGRAM(S): 100 TABLET, FILM COATED ORAL at 05:11

## 2020-01-01 RX ADMIN — LOSARTAN POTASSIUM 50 MILLIGRAM(S): 100 TABLET, FILM COATED ORAL at 05:49

## 2020-01-01 RX ADMIN — BUDESONIDE AND FORMOTEROL FUMARATE DIHYDRATE 2 PUFF(S): 160; 4.5 AEROSOL RESPIRATORY (INHALATION) at 06:31

## 2020-01-01 RX ADMIN — CEFTRIAXONE 1000 MILLIGRAM(S): 500 INJECTION, POWDER, FOR SOLUTION INTRAMUSCULAR; INTRAVENOUS at 13:21

## 2020-01-01 RX ADMIN — HEPARIN SODIUM 7500 UNIT(S): 5000 INJECTION INTRAVENOUS; SUBCUTANEOUS at 05:15

## 2020-01-01 RX ADMIN — PANTOPRAZOLE SODIUM 40 MILLIGRAM(S): 20 TABLET, DELAYED RELEASE ORAL at 05:16

## 2020-01-01 RX ADMIN — Medication 975 MILLIGRAM(S): at 11:25

## 2020-01-01 RX ADMIN — BUPROPION HYDROCHLORIDE 150 MILLIGRAM(S): 150 TABLET, EXTENDED RELEASE ORAL at 13:54

## 2020-01-01 RX ADMIN — BUDESONIDE AND FORMOTEROL FUMARATE DIHYDRATE 2 PUFF(S): 160; 4.5 AEROSOL RESPIRATORY (INHALATION) at 05:49

## 2020-01-01 RX ADMIN — TAMSULOSIN HYDROCHLORIDE 0.4 MILLIGRAM(S): 0.4 CAPSULE ORAL at 21:10

## 2020-01-01 RX ADMIN — PROPOFOL 56 MICROGRAM(S)/KG/MIN: 10 INJECTION, EMULSION INTRAVENOUS at 05:17

## 2020-01-01 RX ADMIN — PIPERACILLIN AND TAZOBACTAM 25 GRAM(S): 4; .5 INJECTION, POWDER, LYOPHILIZED, FOR SOLUTION INTRAVENOUS at 21:09

## 2020-01-01 RX ADMIN — HEPARIN SODIUM 5000 UNIT(S): 5000 INJECTION INTRAVENOUS; SUBCUTANEOUS at 18:26

## 2020-03-20 NOTE — ED PROVIDER NOTE - PROGRESS NOTE DETAILS
Spoke with radiology, no acute pathology in CT abdomen. From CT chest likely viral PNA. Will admit to hospitalist. - Amanda Dawkins PA-C Spoke with patient about CT results, possibly COVID-19. Patient understands. Also spoke with patient's wife Nydia [phone], about results - Amanda Dawkins PA-C Spoke with radiology, no acute pathology in CT abdomen, AAA stable. From CT chest likely viral PNA. Will admit to hospitalist. - Amanda Dawkins PA-C

## 2020-03-20 NOTE — ED ADULT NURSE REASSESSMENT NOTE - NS ED NURSE REASSESS COMMENT FT1
Pt. resting in stretcher in no acute distress. Pt. reports pain has improved, but reports feeling light-headed. TAMARA Patel made aware, instructed to give pt. food. VSS. Will continue to reassess.

## 2020-03-20 NOTE — H&P ADULT - PROBLEM SELECTOR PLAN 1
- patient does require supplemental oxygen at home PRN, however he remarks that at home he has had increased needs; currently saturating at 91-95% on 3L nasal canula  - will hold off on cpap

## 2020-03-20 NOTE — H&P ADULT - NSHPREVIEWOFSYSTEMS_GEN_ALL_CORE
GENERAL: + fevers, + chills.  EYES: No blurry vision,  No photophobia  ENT: No sore throat.  No dysphagia  Cardiovascular: No chest pain, palpitations, orthopnea  Pulmonary: + cough, no wheezing. + shortness of breath  Gastrointestinal: No abdominal pain, no diarrhea, no constipation.    Musculoskeletal: No weakness.  No myalgias.  Dermatology:  No rashes.  Neuro: No Headache.  No vertigo.  No dizziness.  Psych: No anxiety, no depression.  Denies suicidal thoughts.

## 2020-03-20 NOTE — ED PROVIDER NOTE - OBJECTIVE STATEMENT
70 y/o M with PMH JENSEN, AAA, RBBB, HLD, CAD, former smoker presents to ED with lower back pain. Patient states that pain started yesterday, located "between kidneys." Pain described as sharp, worse with movement, although improved at this time. Patient states that he was feeling more SOB last night as well. Patient has O2 at home, unknown amount. Denies increased need for O2 though. At baseline patient states that he has a dry cough, that he has had for a long time.   Patient states that this morning he had a BM that was looser than usual, although non watery. +dizziness, patient states that he tried to stand today and almost fell.   Denies fever, chest pain, abdominal pain, nausea, vomiting, sick contacts, known COVID + contacts, recent travel, diarrhea, hematuria, dysuria.   Patient unsure of home medications, states that he is on 7 medications.

## 2020-03-20 NOTE — H&P ADULT - ATTENDING COMMENTS
signed out to ed obed phan at [phone]  wife Nydia updated by the ER    Ivette Croft D.O.  Hospitalist Pager # [phone]

## 2020-03-20 NOTE — ED PROVIDER NOTE - PHYSICAL EXAMINATION
GEN: Wearing NC, speaking in full sentences  HEENT: NC/AT, Symm Facies. EOMI  CV: +S1S2, RRR w/o m/g/r  RESP: mildly diffuse crackles b/l  ABD: Soft, nt/nd, +BS. No guarding/rebound.   EXT/MSK: No lower extremity edema or calf tenderness. Lower back non tender to palpation   SKIN: No erythema, lesions or rash to abdomen. Skin hot to touch  Neuro: Grossly intact, AOX3 with normal speech, No focal deficits

## 2020-03-20 NOTE — ED PROVIDER NOTE - CARE PLAN
Principal Discharge DX:	Viral illness  Secondary Diagnosis:	Fever  Secondary Diagnosis:	SOB (shortness of breath)

## 2020-03-20 NOTE — H&P ADULT - HISTORY OF PRESENT ILLNESS
Patient is a 69 year old current smoker male with JESNEN on home o2, CAD, HTN, CKD stage 3, HLD and AAA presents to the ED with shortness of breath and lower back pain. He remarks that shortness of breath has been worsening for the last 2-3 days. He does endorse subjective fevers, chills. He also admits to dry cough that he has chronically but it has worsened for the last 2-3 days. He denies recent travel or sick contacts. He has had 1-2 loose bowel movements today. He endorses profound weakness; denies near syncope or syncope. He denies pain/ burning with urination, diarrhea, chest pain, palpitations.

## 2020-03-20 NOTE — ED PROVIDER NOTE - NS ED ATTENDING STATEMENT MOD
I have personally performed a face to face diagnostic evaluation on this patient. I have reviewed the ACP note and agree with the history, exam and plan of care, except as noted.
7

## 2020-03-20 NOTE — H&P ADULT - NSHPLABSRESULTS_GEN_ALL_CORE
.  LABS:                         15.2   5.10  )-----------( 113      ( 20 Mar 2020 11:49 )             47.2     03-20    132<L>  |  95<L>  |  24<H>  ----------------------------<  125<H>  4.2   |  22  |  1.43<H>    Ca    8.6      20 Mar 2020 11:49    TPro  7.3  /  Alb  4.0  /  TBili  0.9  /  DBili  x   /  AST  63<H>  /  ALT  83<H>  /  AlkPhos  82  03-20    PT/INR - ( 20 Mar 2020 11:49 )   PT: 12.8 sec;   INR: 1.11 ratio         PTT - ( 20 Mar 2020 11:49 )  PTT:28.4 sec  Urinalysis Basic - ( 20 Mar 2020 14:42 )    Color: Yellow / Appearance: Clear / S.022 / pH: x  Gluc: x / Ketone: Trace  / Bili: Negative / Urobili: Negative   Blood: x / Protein: 30 mg/dL / Nitrite: Negative   Leuk Esterase: Negative / RBC: 1 /hpf / WBC 1 /HPF   Sq Epi: x / Non Sq Epi: 1 /hpf / Bacteria: Negative            RADIOLOGY, EKG & ADDITIONAL TESTS: EKG PENDING

## 2020-03-20 NOTE — ED ADULT NURSE NOTE - NSIMPLEMENTINTERV_GEN_ALL_ED
Implemented All Fall Risk Interventions:  [place] to call system. Call bell, personal items and telephone within reach. Instruct patient to call for assistance. Room bathroom lighting operational. Non-slip footwear when patient is off stretcher. Physically safe environment: no spills, clutter or unnecessary equipment. Stretcher in lowest position, wheels locked, appropriate side rails in place. Provide visual cue, wrist band, yellow gown, etc. Monitor gait and stability. Monitor for mental status changes and reorient to person, place, and time. Review medications for side effects contributing to fall risk. Reinforce activity limits and safety measures with patient and family.

## 2020-03-20 NOTE — PATIENT PROFILE ADULT - NSPROMUTINFOINDIVIDFT_GEN_A_NUR
You can access the FollowMyHealth Patient Portal offered by [facility] by registering at the following website: [email or web address]. By joining [employer]’s FollowMyHealth portal, you will also be able to view your health information using other applications (apps) compatible with our system. none

## 2020-03-20 NOTE — ED PROVIDER NOTE - NS ED ROS FT
Constitutional: No fever or chills  Eyes: No visual changes  HEENT: No throat pain, ear pain, nasal pain. No nose bleeding.  CV: No chest pain or lower extremity edema  Resp: +SOB + chronic cough  GI: No abd pain. No nausea or vomiting. +loose stools  : No dysuria, hematuria.   MSK: +lower back pain   Skin: No rash  Psych: No complaints   Neuro: No numbness or tingling. No weakness.

## 2020-03-20 NOTE — ED PROVIDER NOTE - ATTENDING CONTRIBUTION TO CARE
I have seen and evaluated this patient with the [place] practice clinician.   I agree with the findings  unless other wise stated. I have amended notes where needed.  After my face to face bedside evaluation, I am notin68 y/o M with PMH JENSEN, AAA, RBBB, HLD, CAD, former smoker presents to ED with lower back pain. Patient states that pain started yesterday, located "between kidneys." Pain described as sharp, worse with movement, although improved at this time. Patient states that he was feeling more SOB last night as well. Patient has O2 at home, unknown amount. Denies increased need for O2 though. At baseline patient states that he has a dry cough, that he has had for a long time.   Patient states that this morning he had a BM that was looser than usual, although non watery. +dizziness, patient states that he tried to stand today and almost fell. Denies fever, chest pain, abdominal pain, nausea, vomiting, sick contacts, known COVID + contacts, recent travel, diarrhea, hematuria, dysuria.   Patient unsure of home medications, states that he is on 7 medications. Alert morbid obesity mild respiratory distress tachypnea Heart tachycardia Lungs air entry diminished lower lung fields abdomen soft No signs of meningeal irritation concern for PNA ? COVID sepsis evaluation antibiotcs admission --[place]a

## 2020-03-20 NOTE — H&P ADULT - PROBLEM SELECTOR PLAN 3
- bp elevated (patient has not taken any medications all day  - c/w cardizem and start losartan (as irbesartan substitute)   - vitals q4h

## 2020-03-20 NOTE — ED PROVIDER NOTE - CLINICAL SUMMARY MEDICAL DECISION MAKING FREE TEXT BOX
68 y/o M with PMH JENSEN, AAA, RBBB, HLD, CAD, former smoker presents to ED with lower back pain. Patient states that pain started yesterday, located "between kidneys." Pain described as sharp, worse with movement, although improved at this time. Patient states that he was feeling more SOB last night as well. Patient has O2 at home, unknown amount. Denies increased need for O2 though. At baseline patient states that he has a dry cough, that he has had for a long time.   Patient states that this morning he had a BM that was looser than usual, although non watery. +dizziness, patient states that he tried to stand today and almost fell. Denies fever, chest pain, abdominal pain, nausea, vomiting, sick contacts, known COVID + contacts, recent travel, diarrhea, hematuria, dysuria.   Patient unsure of home medications, states that he is on 7 medications. Alert morbid obesity mild respiratory distress tachypnea Heart tachycardia Lungs air entry diminished lower lung fields abdomen soft No signs of meningeal irritation concern for PNA ? COVID sepsis evaluation antibiotcs admission --[place]

## 2020-03-20 NOTE — H&P ADULT - ASSESSMENT
Patient is a 69 year old current smoker male with JENSEN on home o2, CAD, HTN, CKD stage 3, HLD and AAA presents to the ED with shortness of breath, cough and fevers admitted for COVID-19 rule out.

## 2020-03-20 NOTE — H&P ADULT - PROBLEM SELECTOR PLAN 2
- CT lung findings of bilateral ground glass opacities, lymphopenia with viral illness symptoms very suspicious for COVID-19 infection  - will obtain EKG STAT-- if QTC not prolonged, will start plaquenil (400 mg po q12h x 1 day, then 200 mg q12h)-- discussed with ED NP ERIKA  - STAT CRP, LDH, Ferritin ordered  - procal mildly elevated  - consult placed to infection disease-- discussed with ED NP Erika  - pneumonia unlikely but will send urine legionella and hold off on further antibiotics for now  - blood cultures x 2 sent  - flu and RSV NEGATIVE

## 2020-03-20 NOTE — ED PROVIDER NOTE - PSH
S/P hernia repair  5/2012   Umbilical with Mesh  S/P hernia repair  ' 2007   Umbilical with Mesh  S/P laminectomy  ' 90's   Lumbar  S/P tonsillectomy  childhood

## 2020-03-20 NOTE — ED ADULT NURSE NOTE - OBJECTIVE STATEMENT
68 y/o male coming in via EMS from home complaining of severe back pain. AOx4, ambulatory, PMH HTN, aortic aneurysm, COPD 68 y/o male coming in via EMS from home complaining of severe back pain. AOx4, ambulatory, PMH HTN, aortic aneurysm, COPD. Pt. states he began to have severe back pain last night that felt similar to the last time he had an AA. Pt. came to the ED this morning for worsening pain. Pt. usually O2 at home, unknown amt, but reports not needing any extra oxygen. Pt. currently satting 92% on 3 lpm via NC. Pt. also reports feeling extremely dizzy upon ambulating, reports near fall, but was able to catch himself prior to falling. Pt. denying any cough, but currently febrile and low O2 sat, COVID precautions initiated. Pt. denies any recent travel or contact with sick persons. Denies any changes in urination. Reports loose bowel movement this morning, denies any blood in the stool. 18G LAC, 18G RAC. Pt. placed in hospital gown and on cardiac monitor. EKG done at bedside. VSS. Will continue to reassess.

## 2020-03-21 NOTE — PROGRESS NOTE ADULT - PROBLEM SELECTOR PLAN 1
- patient does require supplemental oxygen at home PRN, however he remarks that at home he has had increased needs;   currently saturating at 93-95% on 5L nasal canula, required NRB overnight  - will hold off on cpap

## 2020-03-21 NOTE — CHART NOTE - NSCHARTNOTEFT_GEN_A_CORE
Chart reviewed. I discussed case with hospitalist. Patient with worsening respiratory status, planned to start Plaquenil. Concern for elevated QTC. Given worsening status from suspected COVID, and risk of progression, I expect that the current expected benefits (based on available data) outweigh the risks of QT prolongation. Would continue for now.  - Continue Plaquenil (stop after 5 days)  - Follow up COVID PCR (if negative, stop plaquenil)  - Monitor EKGs, reevaluate role for plaquenil depending on degree of QTC and patient clinical status  - Will hold on formal ID consult for present time, but please call with specific questions or change in status (will undergo formal consult as needed)    Domingo Dixon MD  Pager [phone]  After 5pm and on weekends call [phone] Chart reviewed. I discussed case with hospitalist. Patient with worsening respiratory status, planned to start Plaquenil. Concern for elevated QTC. Given worsening status from suspected COVID, and risk of progression, I expect that the current expected benefits (based on available data) outweigh the risks of QT prolongation. Would continue for now.  - Continue Plaquenil (stop after 5 days)  - Monitor EKGs, reevaluate role for plaquenil depending on degree of QTC and patient clinical status  - Will hold on formal ID consult for present time, but please call with specific questions or change in status (will undergo formal consult as needed)    **addendum--Patient COVID PCR negative. Patient clinical scenario (ground glass opacities, lymphopenia, fevers, resp deterioration) concerning for COVID; alternate diagnosis (if certain was not COVID) would be bacterial pneumonia, I would recommend repeating COVID PCR and maintaining isolation for now    Would also add Ceftriaxone for possible bacterial pneumonia given uncertainty and worsening resp status; consider Azithro but note prolonged QTC    Domingo Dixon MD  Pager [phone]  After 5pm and on weekends call [phone]

## 2020-03-21 NOTE — PROGRESS NOTE ADULT - SUBJECTIVE AND OBJECTIVE BOX
Patient is a 70y old  Male who presents with a chief complaint of shortness of breath (20 Mar 2020 18:34)      SUBJECTIVE / OVERNIGHT EVENTS: Patient seen and examined at bedside. He complains of dizziness (chronic for 5-6 years), denies CP, SOB at this time. complains of cough. he required NRB overnight due to hypoxia, now transition to NC.     ROS:  All other review of systems negative    Allergies    No Known Allergies    Intolerances        MEDICATIONS  (STANDING):  aspirin  chewable 81 milliGRAM(s) Oral daily  atorvastatin 80 milliGRAM(s) Oral at bedtime  budesonide 160 MICROgram(s)/formoterol 4.5 MICROgram(s) Inhaler 2 Puff(s) Inhalation two times a day  buPROPion XL . 150 milliGRAM(s) Oral daily  cefTRIAXone   IVPB 1000 milliGRAM(s) IV Intermittent every 24 hours  diltiazem    milliGRAM(s) Oral daily  heparin  Injectable 5000 Unit(s) SubCutaneous every 12 hours  hydroxychloroquine 400 milliGRAM(s) Oral two times a day  losartan 50 milliGRAM(s) Oral daily  pantoprazole    Tablet 40 milliGRAM(s) Oral before breakfast  tamsulosin 0.4 milliGRAM(s) Oral at bedtime    MEDICATIONS  (PRN):  ALBUTerol    90 MICROgram(s) HFA Inhaler 2 Puff(s) Inhalation every 6 hours PRN Bronchospasm      Vital Signs Last 24 Hrs  T(C): 37.4 (21 Mar 2020 08:59), Max: 39.2 (20 Mar 2020 20:21)  T(F): 99.3 (21 Mar 2020 08:59), Max: 102.6 (20 Mar 2020 20:21)  HR: 83 (21 Mar 2020 08:59) (68 - 83)  BP: 160/80 (21 Mar 2020 08:59) (148/85 - 194/101)  BP(mean): --  RR: 20 (21 Mar 2020 08:59) (20 - 23)  SpO2: 95% (21 Mar 2020 08:59) (93% - 97%)  CAPILLARY BLOOD GLUCOSE        I&O's Summary    20 Mar 2020 07:01  -  21 Mar 2020 07:00  --------------------------------------------------------  IN: 0 mL / OUT: 500 mL / NET: -500 mL    21 Mar 2020 07:01  -  21 Mar 2020 14:16  --------------------------------------------------------  IN: 460 mL / OUT: 400 mL / NET: 60 mL        PHYSICAL EXAM:  GENERAL: NAD, well-developed  HEAD:  Atraumatic, Normocephalic  EYES: EOMI, PERRLA, conjunctiva and sclera clear  NECK: Supple, No JVD  CHEST/LUNG: Clear to auscultation bilaterally; No wheeze  HEART: Regular rate and rhythm; No murmurs, rubs, or gallops  ABDOMEN: Soft, Nontender, Nondistended; Bowel sounds present  EXTREMITIES:  2+ Peripheral Pulses, No clubbing, cyanosis, or edema  NEUROLOGY: AAOx3, non-focal  PSYCH: calm  SKIN: No rashes or lesions    LABS:                        14.6   4.00  )-----------( 105      ( 21 Mar 2020 08:00 )             45.7     03-21    132<L>  |  95<L>  |  20  ----------------------------<  96  3.9   |  24  |  1.12    Ca    8.6      21 Mar 2020 08:00    TPro  7.0  /  Alb  3.8  /  TBili  0.8  /  DBili  x   /  AST  46<H>  /  ALT  66<H>  /  AlkPhos  74  03-21    PT/INR - ( 20 Mar 2020 11:49 )   PT: 12.8 sec;   INR: 1.11 ratio         PTT - ( 20 Mar 2020 11:49 )  PTT:28.4 sec      Urinalysis Basic - ( 20 Mar 2020 14:42 )    Color: Yellow / Appearance: Clear / S.022 / pH: x  Gluc: x / Ketone: Trace  / Bili: Negative / Urobili: Negative   Blood: x / Protein: 30 mg/dL / Nitrite: Negative   Leuk Esterase: Negative / RBC: 1 /hpf / WBC 1 /HPF   Sq Epi: x / Non Sq Epi: 1 /hpf / Bacteria: Negative        RADIOLOGY & ADDITIONAL TESTS:    Consultant(s) Notes Reviewed:  ID note reviewed     Care Discussed with Consultants/Other Providers: Medicine NP and Dr. Dixon

## 2020-03-21 NOTE — PROGRESS NOTE ADULT - PROBLEM SELECTOR PLAN 3
- bp elevated (patient has not taken any medications all day  - c/w cardizem and  losartan (as irbesartan substitute)   - vitals q4h

## 2020-03-21 NOTE — PROVIDER CONTACT NOTE (OTHER) - ACTION/TREATMENT ORDERED:
NP made aware. As per NP, give inhaler and increase O2 to 6L and pt to remain on NC. will continue to monitor. pt safety maintained

## 2020-03-21 NOTE — PROVIDER CONTACT NOTE (OTHER) - ASSESSMENT
pt resting comfortably in bed, no c/o pain, no s/s of distress. all VSS at this time. Pt with PMH of JENSEN, AAA and HTN.

## 2020-03-21 NOTE — PROGRESS NOTE ADULT - PROBLEM SELECTOR PLAN 2
- CT lung findings of bilateral ground glass opacities, lymphopenia with viral illness symptoms very suspicious for COVID-19 infection  - EGDL Qtc yesterday 500s, will obtain repeat EKG to reassess   - d/w Dr. Dixon, pt would benefit from plaquenil due to worsening respiratory status   - started on CTX, hold off on azithromycin for now as it may prolong Qtc as well  - initial COVID19 neg, d/w ID pt is high risk for will get repeat COVID19 PCR today  - f/u blood cultures   - flu and RSV NEGATIVE

## 2020-03-22 NOTE — CHART NOTE - NSCHARTNOTEFT_GEN_A_CORE
Patient is a 69 year old current smoker male with JENSEN on home o2, CAD, HTN, CKD stage 3, HLD and AAA presents to the ED with shortness of breath, cough and fevers admitted for COVID-19 rule out.  Called by Rn that pt. with minimal movement to side of bed desaturating on NRB.   Pt. encouraged to stay in bed , NRB in place and sat@95%  Chest x-ray ordered urgently.   Micu consulted.   Disucssed case with Dr. Thomas   Will await further reccs.   [facility]-BC

## 2020-03-22 NOTE — CONSULT NOTE ADULT - ATTENDING COMMENTS
I have examined pt at bedside with resident and agree with above exam and plan. 71 y/o male with morbid obesity , blue  with CPAP at night , 02dependent COPD, admitted for worsening SOB and oxygenation. CT chest with RLL ground glass opacity . Covid -19 negative x1. Second pending. Pt is oxygenating adequately on 100% non rebreather.   Agree that if covid negative again start  nebulized bronchodilators, steroid and CPAP.    Pt is not a MICU candidate at this time.

## 2020-03-22 NOTE — CHART NOTE - NSCHARTNOTEFT_GEN_A_CORE
Cath Fellow Brief Note    Asked to eval this pt for abnormal ECG and concern for STEMI. Briefly, 69 y/o M w/ PMH of HTN, HLD, CKD3, AAA s/p EVAR, ILD, COPD on 3L home O2, JENSEN on CPAP c/o dyspnea w/ concern for COVID19. Team was getting serial ECGs and noted abnormal morphology. Pt reports worsening dyspnea over last 2W, but denies any CP. No CP currently. No palps. No N/V.    I have reviewed ECG and it reveals a markedly wide QRS w/ RBBB morphology, and possibly isolated ST elev in V1, but no other ST changes elsewhere.    A/P:  -Does not meet ECG criteria for STEMI  -Clinical suspicion for ACS is low    #D/w IC attg Dr. Benites  #Call w/ any Qs    Gregory Crowder MD  Cardiology Fellow  [phone]  All Cardiology service information can be found 24/7 on [email or web address], password: [email or web address]

## 2020-03-22 NOTE — CONSULT NOTE ADULT - ASSESSMENT
70 year old current smoker male with JENSEN on home o2, CAD, HTN, CKD stage 3, HLD and AAA admitted with worsening chronic cough and SOB initial Corvid - 19 (-)     Pulmonary:   Hypoxic respiratory failure     - Please send urine legionella  - may also re check RVP    - if febrile please repeat blood- urine and sputum cx   - f/u with repeat CORVID 19 PCR   - if patient symptoms persist please maintain NPO status   - May also consider sending serum Fungitll   - if CORVID 19 PCR (-) may benefit from solumedrol 20 TID with dick   - continue with all MDI   - Please consider Azithromycin 500 mg daily for at least 3 days   - Obtain Blood gas   - Please Place patient back on Cpap q HS and O2 during the day as tolerated if COVID repeat is (-) 70 year old current smoker male with JENSEN on home o2, CAD, HTN, CKD stage 3, HLD and AAA admitted with worsening chronic cough and SOB initial Corvid - 19 (-)     Pulmonary:   Hypoxic respiratory failure     - Please send urine legionella  - may also re check RVP    - if febrile please repeat blood- urine and sputum cx   - f/u with repeat CORVID 19 PCR   - if patient symptoms persist please maintain NPO status   - May also consider sending serum Fungitll   - if CORVID 19 PCR (-) may benefit from solumedrol 20 TID with dick   - continue with all MDI   - Please consider Azithromycin 500 mg daily for at least 3 days   - Obtain Blood gas   - Please Place patient back on Cpap q HS and O2 during the day as tolerated if COVID repeat is (-)   - check EKG for QTC while on Plaquenil 70M with COPD on home 3L NC, JENSEN on CPAP, CAD, HTN, CKD stage 3, HLD and AAA admitted with worsening chronic cough and SOB, found to have acute on chronic hypoxic respiratory failure  initial COVID- 19 (-)     #Acute on chronic hypoxic respiratory failure   - Please send urine legionella  - if febrile please repeat blood- urine and sputum cx   - f/u with repeat COVID 19 PCR   - if patient symptoms persist please maintain NPO status   - if COVID 19 PCR (-) may benefit from solumedrol 20 TID with taper   - continue with all albuterol MDI and symbicort   - Please consider Azithromycin 500 mg daily for at least 3 days   - Obtain ABG  - When COVID negative, please resume Cpap q HS and O2 during the day as tolerated   - check EKG for QTC while on Plaquenil     Patient currently not a candidate for MICU at this time. Please reconsult if worsening respiratory status.

## 2020-03-22 NOTE — PROGRESS NOTE ADULT - ASSESSMENT
69 year old current smoker male with JENSEN on home o2, CAD, HTN, CKD stage 3, HLD and AAA presents to the ED with shortness of breath, cough and fevers admitted for COVID-19 rule out. Currently acute on chronic respiratory failure with hypoxia.

## 2020-03-22 NOTE — CONSULT NOTE ADULT - SUBJECTIVE AND OBJECTIVE BOX
CHIEF COMPLAINT: Worsening Hypoxic respiratory failure     70 year old current smoker male with JENSEN on home o2, CAD, HTN, CKD stage 3, HLD and AAA Originally presented with  shortness of breath and lower back pain. Per chart notes respiratory status has been worsening for the last 2-3 days without associated fevers or chills but reported worsening of chronic cough 2-3 days. The patient was admitted to the Medical team service for further management.  Blood and urine cx and RVP  have been negative.  Initial CORVID -19 and was found to be negative. However, test has been re checked due to worsening respiratory status. Currently the patient is maintained on 100%. Plaquenil and Ceftriaxone have also been initiated. MICU Corvid unit consulted at this time for worsening hypoxic respiratory failure.    PAST MEDICAL & SURGICAL HISTORY:  JENSEN (obstructive sleep apnea)  HTN (hypertension)  Current smoker: 1ppd X 40 years  Umbilical hernia: 2012 surgically treated  Inguinal hernia: [facility]   &#x27; 2007 surgically treated  Herniated lumbar intervertebral disc: surgically treated &#x27; 90&#x27;s.    dx: 10/2012: recurrent herniated Lumbar Discs. Furthur workup to be done after AAA repair  Hyperlipidemia  Heart murmur  Right bundle branch block  CAD (coronary artery disease)  AAA (abdominal aortic aneurysm): dx: 10/2012  S/P laminectomy: &#x27; 90&#x27;s   Lumbar  S/P hernia repair: 2012   Umbilical with Mesh  S/P hernia repair: &#x27; 2007   Umbilical with Mesh  S/P tonsillectomy: childhood      FAMILY HISTORY:  FH: hypertension      Allergies    No Known Allergies    Intolerances      HOME MEDICATIONS:    REVIEW OF SYSTEMS:  Constitutional: [ ] fevers [ ] chills [ ] weight loss [ ] weight gain  HEENT: [ ] dry eyes [ ] eye irritation [ ] postnasal drip [ ] nasal congestion  CV: [ ] chest pain [ ] orthopnea [ ] palpitations [ ] murmur  Resp: [ ] cough [ ] shortness of breath [ ] dyspnea [ ] wheezing [ ] sputum [ ] hemoptysis  GI: [ ] nausea [ ] vomiting [ ] diarrhea [ ] constipation [ ] abd pain [ ] dysphagia   : [ ] dysuria [ ] nocturia [ ] hematuria [ ] increased urinary frequency  Musculoskeletal: [ ] back pain [ ] myalgias [ ] arthralgias [ ] fracture  Skin: [ ] rash [ ] itch  Neurological: [ ] headache [ ] dizziness [ ] syncope [ ] weakness [ ] numbness  Psychiatric: [ ] anxiety [ ] depression  Endocrine: [ ] diabetes [ ] thyroid problem  Hematologic/Lymphatic: [ ] anemia [ ] bleeding problem  Allergic/Immunologic: [ ] itchy eyes [ ] nasal discharge [ ] hives [ ] angioedema  [ ] All other systems negative  [ ] Unable to assess ROS because ________    OBJECTIVE:  ICU Vital Signs Last 24 Hrs  T(C): 37 (22 Mar 2020 13:05), Max: 38.8 (21 Mar 2020 18:00)  T(F): 98.6 (22 Mar 2020 13:05), Max: 101.8 (21 Mar 2020 18:00)  HR: 67 (22 Mar 2020 13:05) (64 - 85)  BP: 147/78 (22 Mar 2020 13:05) (147/78 - 170/89)  BP(mean): --  ABP: --  ABP(mean): --  RR: 20 (22 Mar 2020 13:05) (20 - 22)  SpO2: 97% (22 Mar 2020 13:05) (88% - 97%)         @ 07:  -   @ 07:00  --------------------------------------------------------  IN: 560 mL / OUT: 1700 mL / NET: -1140 mL     @ 07:01   @ 13:47  --------------------------------------------------------  IN: 460 mL / OUT: 400 mL / NET: 60 mL      CAPILLARY BLOOD GLUCOSE          PHYSICAL EXAM:  General:   HEENT:   Lymph Nodes:  Neck:   Respiratory:   Cardiovascular:   Abdomen:   Extremities:   Skin:   Neurological:  Psychiatry:    LINES:     HOSPITAL MEDICATIONS:  MEDICATIONS  (STANDING):  aspirin  chewable 81 milliGRAM(s) Oral daily  atorvastatin 80 milliGRAM(s) Oral at bedtime  budesonide 160 MICROgram(s)/formoterol 4.5 MICROgram(s) Inhaler 2 Puff(s) Inhalation two times a day  buPROPion XL . 150 milliGRAM(s) Oral daily  cefTRIAXone   IVPB 1000 milliGRAM(s) IV Intermittent every 24 hours  diltiazem    milliGRAM(s) Oral daily  heparin  Injectable 5000 Unit(s) SubCutaneous every 12 hours  hydroxychloroquine 200 milliGRAM(s) Oral two times a day  losartan 50 milliGRAM(s) Oral daily  pantoprazole    Tablet 40 milliGRAM(s) Oral before breakfast  tamsulosin 0.4 milliGRAM(s) Oral at bedtime    MEDICATIONS  (PRN):  acetaminophen   Tablet .. 650 milliGRAM(s) Oral every 6 hours PRN Temp greater or equal to 38C (100.4F)  ALBUTerol    90 MICROgram(s) HFA Inhaler 2 Puff(s) Inhalation every 6 hours PRN Bronchospasm      LABS:                        14.3   4.36  )-----------( 100      ( 22 Mar 2020 06:48 )             44.3     Hgb Trend: 14.3<--, 14.6<--, 15.2<--  03-22    132<L>  |  93<L>  |  22  ----------------------------<  107<H>  4.8   |  27  |  1.22    Ca    8.5      22 Mar 2020 06:48    TPro  6.8  /  Alb  3.5  /  TBili  0.8  /  DBili  x   /  AST  42<H>  /  ALT  51<H>  /  AlkPhos  65  03-22    Creatinine Trend: 1.22<--, 1.12<--, 1.43<--    Urinalysis Basic - ( 20 Mar 2020 14:42 )    Color: Yellow / Appearance: Clear / S.022 / pH: x  Gluc: x / Ketone: Trace  / Bili: Negative / Urobili: Negative   Blood: x / Protein: 30 mg/dL / Nitrite: Negative   Leuk Esterase: Negative / RBC: 1 /hpf / WBC 1 /HPF   Sq Epi: x / Non Sq Epi: 1 /hpf / Bacteria: Negative            MICROBIOLOGY:   Culture - Blood (20 @ 18:00)    Specimen Source: .Blood Blood-Peripheral    Culture Results:   No growth to date.    Culture - Blood (20 @ 18:00)    Specimen Source: .Blood Blood-Peripheral    Culture Results:   No growth to date.    Culture - Urine (20 @ 17:13)    Specimen Source: .Urine Clean Catch (Midstream)    Culture Results:   <10,000 CFU/mL Normal Urogenital Ammy          RADIOLOGY:  < from: Xray Chest 1 View- PORTABLE-Urgent (20 @ 12:29) >    IMPRESSION:   1. Persistent left retrocardiac opacity - likely due to infection.  2. Interval increase in right lung patchy opacities likely due to multifocal infection (viral pneumonia is a consideration - including Covid-19).    < end of copied text >    < from: CT Abdomen and Pelvis w/ IV Cont (20 @ 15:12) >  MPRESSION:     No evidence of aortic dissection or intramural hematoma.    Abdominal aortic aneurysm has increased in sized from 8.3 cm in diameter to 9.2 cm since 2019.    < end of copied text >      EKG:  < from: 12 Lead ECG (20 @ 18:59) >    Ventricular Rate 72 BPM    Atrial Rate 72 BPM    P-R Interval 188 ms    QRS Duration 158 ms    Q-T Interval 498 ms    QTC Calculation(Bezet) 545 ms    P Axis 43 degrees    R Axis 9 degrees    T Axis 36 degrees    Diagnosis Line NORMAL SINUS RHYTHM  RIGHT BUNDLE BRANCH BLOCK  INFERIOR INFARCT , AGE UNDETERMINED  Possible septal hypertrophy  ABNORMAL ECG  LAE  Confirmed by Uriel Menendez ([id number]) on 3/21/2020 12:27:16 PM    < end of copied text >      Trish Weathers [facility]- BC ex [id number] CHIEF COMPLAINT: Worsening Hypoxic respiratory failure     70M with COPD on home 3L NC, JENSEN on CPAP, CAD, HTN, CKD stage 3, HLD and AAA Originally presented with  shortness of breath and lower back pain. Per chart notes respiratory status has been worsening for the last 2-3 days without associated fevers or chills but reported worsening of chronic cough 2-3 days. The patient was admitted to the Medical team service for further management.  Blood and urine cx and RVP  have been negative.  CT chest noting small rounded GGO. Initial CORVID -19 and was found to be negative. However, test has been rechecked due to worsening respiratory status. Currently the patient is maintained on 100%. Plaquenil and Ceftriaxone have also been initiated. MICU COVID unit consulted at this time for worsening hypoxic respiratory failure.    Patient endorsing inability to lie flat. He has been unable to use his CPAP machine since admission. With sitting upright, his sx had improved. He was started on NRB mask however patient currently not wearing.         PAST MEDICAL & SURGICAL HISTORY:  JENSEN (obstructive sleep apnea)  HTN (hypertension)  Current smoker: 1ppd X 40 years  Umbilical hernia: 2012 surgically treated  Inguinal hernia: [facility]   &#x27; 2007 surgically treated  Herniated lumbar intervertebral disc: surgically treated &#x27; 90&#x27;s.    dx: 10/2012: recurrent herniated Lumbar Discs. Furthur workup to be done after AAA repair  Hyperlipidemia  Heart murmur  Right bundle branch block  CAD (coronary artery disease)  AAA (abdominal aortic aneurysm): dx: 10/2012  S/P laminectomy: &#x27; 90&#x27;s   Lumbar  S/P hernia repair: 2012   Umbilical with Mesh  S/P hernia repair: &#x27; 2007   Umbilical with Mesh  S/P tonsillectomy: childhood      FAMILY HISTORY:  FH: hypertension      Allergies    No Known Allergies    Intolerances      HOME MEDICATIONS:    REVIEW OF SYSTEMS:    CONSTITUTIONAL: No weakness, + fevers or chills  EYES/ENT: No visual changes;  No vertigo or throat pain   NECK: No pain or stiffness  RESPIRATORY: No cough, wheezing, hemoptysis; + shortness of breath  CARDIOVASCULAR: No chest pain or palpitations' + chest tightness  GASTROINTESTINAL: No abdominal pain; nausea, vomiting;  diarrhea or constipation. No hemetemesis, melena or hematochezia.  GENITOURINARY: No dysuria, frequency or hematuria  NEUROLOGICAL: No numbness or weakness  SKIN: No itching, burning, rashes, or lesions   All other review of systems is negative unless indicated above.    OBJECTIVE:  ICU Vital Signs Last 24 Hrs  T(C): 37 (22 Mar 2020 13:05), Max: 38.8 (21 Mar 2020 18:00)  T(F): 98.6 (22 Mar 2020 13:05), Max: 101.8 (21 Mar 2020 18:00)  HR: 67 (22 Mar 2020 13:) (64 - 85)  BP: 147/78 (22 Mar 2020 13:05) (147/78 - 170/89)  BP(mean): --  ABP: --  ABP(mean): --  RR: 20 (22 Mar 2020 13:05) (20 - 22)  SpO2: 97% (22 Mar 2020 13:05) (88% - 97%)         @ 07:01  -   @ 07:00  --------------------------------------------------------  IN: 560 mL / OUT: 1700 mL / NET: -1140 mL     @ 07:01  -   @ 13:47  --------------------------------------------------------  IN: 460 mL / OUT: 400 mL / NET: 60 mL      CAPILLARY BLOOD GLUCOSE          PHYSICAL EXAM:    General: No acute distress. Obese body habitus  HEENT: NCAT.  PERRL.  EOMI.   Neck: Supple. Large neck circumference  Heart: RRR.  Normal S1 and S2.  No murmurs, rubs, or gallops.   Lungs: Trace right lower crackle   Abdomen: BS+, soft, NT/ND.  No organomegaly.  Skin: Warm and dry.  No rashes.  Extremities: No edema.  2+ peripheral pulses b/l.  Musculoskeletal: No deformities.  No spinal or paraspinal tenderness.  Neuro: A&Ox3.        HOSPITAL MEDICATIONS:  MEDICATIONS  (STANDING):  aspirin  chewable 81 milliGRAM(s) Oral daily  atorvastatin 80 milliGRAM(s) Oral at bedtime  budesonide 160 MICROgram(s)/formoterol 4.5 MICROgram(s) Inhaler 2 Puff(s) Inhalation two times a day  buPROPion XL . 150 milliGRAM(s) Oral daily  cefTRIAXone   IVPB 1000 milliGRAM(s) IV Intermittent every 24 hours  diltiazem    milliGRAM(s) Oral daily  heparin  Injectable 5000 Unit(s) SubCutaneous every 12 hours  hydroxychloroquine 200 milliGRAM(s) Oral two times a day  losartan 50 milliGRAM(s) Oral daily  pantoprazole    Tablet 40 milliGRAM(s) Oral before breakfast  tamsulosin 0.4 milliGRAM(s) Oral at bedtime    MEDICATIONS  (PRN):  acetaminophen   Tablet .. 650 milliGRAM(s) Oral every 6 hours PRN Temp greater or equal to 38C (100.4F)  ALBUTerol    90 MICROgram(s) HFA Inhaler 2 Puff(s) Inhalation every 6 hours PRN Bronchospasm      LABS:                        14.3   4.36  )-----------( 100      ( 22 Mar 2020 06:48 )             44.3     Hgb Trend: 14.3<--, 14.6<--, 15.2<--  03-22    132<L>  |  93<L>  |  22  ----------------------------<  107<H>  4.8   |  27  |  1.22    Ca    8.5      22 Mar 2020 06:48    TPro  6.8  /  Alb  3.5  /  TBili  0.8  /  DBili  x   /  AST  42<H>  /  ALT  51<H>  /  AlkPhos  65  03-22    Creatinine Trend: 1.22<--, 1.12<--, 1.43<--    Urinalysis Basic - ( 20 Mar 2020 14:42 )    Color: Yellow / Appearance: Clear / S.022 / pH: x  Gluc: x / Ketone: Trace  / Bili: Negative / Urobili: Negative   Blood: x / Protein: 30 mg/dL / Nitrite: Negative   Leuk Esterase: Negative / RBC: 1 /hpf / WBC 1 /HPF   Sq Epi: x / Non Sq Epi: 1 /hpf / Bacteria: Negative            MICROBIOLOGY:   Culture - Blood (20 @ 18:00)    Specimen Source: .Blood Blood-Peripheral    Culture Results:   No growth to date.    Culture - Blood (20 @ 18:00)    Specimen Source: .Blood Blood-Peripheral    Culture Results:   No growth to date.    Culture - Urine (20 @ 17:13)    Specimen Source: .Urine Clean Catch (Midstream)    Culture Results:   <10,000 CFU/mL Normal Urogenital Ammy          RADIOLOGY:  < from: Xray Chest 1 View- PORTABLE-Urgent (20 @ 12:29) >    IMPRESSION:   1. Persistent left retrocardiac opacity - likely due to infection.  2. Interval increase in right lung patchy opacities likely due to multifocal infection (viral pneumonia is a consideration - including Covid-19).    < end of copied text >    < from: CT Abdomen and Pelvis w/ IV Cont (20 @ 15:12) >  MPRESSION:     No evidence of aortic dissection or intramural hematoma.    Abdominal aortic aneurysm has increased in sized from 8.3 cm in diameter to 9.2 cm since 2019.    < end of copied text >      EKG:  < from: 12 Lead ECG (20 @ 18:59) >    Ventricular Rate 72 BPM    Atrial Rate 72 BPM    P-R Interval 188 ms    QRS Duration 158 ms    Q-T Interval 498 ms    QTC Calculation(Bezet) 545 ms    P Axis 43 degrees    R Axis 9 degrees    T Axis 36 degrees    Diagnosis Line NORMAL SINUS RHYTHM  RIGHT BUNDLE BRANCH BLOCK  INFERIOR INFARCT , AGE UNDETERMINED  Possible septal hypertrophy  ABNORMAL ECG  LAE  Confirmed by Uriel Menendez ([id number]) on 3/21/2020 12:27:16 PM    < end of copied text >      Trish Weathers [facility]- BC ex [phone]

## 2020-03-22 NOTE — PROGRESS NOTE ADULT - SUBJECTIVE AND OBJECTIVE BOX
[facility] Division of Hospital Medicine  Dariel Thomas MD, MIA  Pager (MARLINE-BRIGITTE, 8A-5P): [phone]  Other Times:  [phone]    Patient is a 70y old  Male who presents with a chief complaint of shortness of breath (22 Mar 2020 13:36)      SUBJECTIVE / OVERNIGHT EVENTS:  No events overnight. This morning the patient become more hypoxic requiring nonrebreather. The patient is c/o lethargy and dizziness.     MEDICATIONS  (STANDING):  aspirin  chewable 81 milliGRAM(s) Oral daily  atorvastatin 80 milliGRAM(s) Oral at bedtime  budesonide 160 MICROgram(s)/formoterol 4.5 MICROgram(s) Inhaler 2 Puff(s) Inhalation two times a day  buPROPion XL . 150 milliGRAM(s) Oral daily  cefTRIAXone   IVPB 1000 milliGRAM(s) IV Intermittent every 24 hours  diltiazem    milliGRAM(s) Oral daily  heparin  Injectable 5000 Unit(s) SubCutaneous every 12 hours  hydroxychloroquine 200 milliGRAM(s) Oral two times a day  losartan 50 milliGRAM(s) Oral daily  pantoprazole    Tablet 40 milliGRAM(s) Oral before breakfast  tamsulosin 0.4 milliGRAM(s) Oral at bedtime    MEDICATIONS  (PRN):  acetaminophen   Tablet .. 650 milliGRAM(s) Oral every 6 hours PRN Temp greater or equal to 38C (100.4F)  ALBUTerol    90 MICROgram(s) HFA Inhaler 2 Puff(s) Inhalation every 6 hours PRN Bronchospasm        I&O's Summary    21 Mar 2020 07:01  -  22 Mar 2020 07:00  --------------------------------------------------------  IN: 560 mL / OUT: 1700 mL / NET: -1140 mL    22 Mar 2020 07:01  -  22 Mar 2020 15:31  --------------------------------------------------------  IN: 460 mL / OUT: 550 mL / NET: -90 mL        PHYSICAL EXAM:  Vital Signs Last 24 Hrs  T(C): 37 (22 Mar 2020 13:05), Max: 38.8 (21 Mar 2020 18:00)  T(F): 98.6 (22 Mar 2020 13:05), Max: 101.8 (21 Mar 2020 18:00)  HR: 67 (22 Mar 2020 13:05) (64 - 78)  BP: 147/78 (22 Mar 2020 13:05) (147/78 - 170/89)  BP(mean): --  RR: 20 (22 Mar 2020 13:05) (20 - 22)  SpO2: 97% (22 Mar 2020 13:05) (88% - 97%)    GENERAL: obese elderly man, on NRB, no distress  HEAD:  Atraumatic, Normocephalic  EYES: EOMI, conjunctiva and sclera clear  NECK: Supple, No JVD  CHEST/LUNG: mildly diminished air entry bilaterally.  HEART: Regular rate and rhythm; No murmurs, rubs, or gallops  ABDOMEN: Soft, Nontender, Nondistended; Bowel sounds present  EXTREMITIES:  No clubbing, cyanosis, or edema  NEUROLOGY: AAOx3, non-focal  PSYCH: calm      LABS:                        14.3   4.36  )-----------( 100      ( 22 Mar 2020 06:48 )             44.3     03-22    132<L>  |  93<L>  |  22  ----------------------------<  107<H>  4.8   |  27  |  1.22    Ca    8.5      22 Mar 2020 06:48    TPro  6.8  /  Alb  3.5  /  TBili  0.8  /  DBili  x   /  AST  42<H>  /  ALT  51<H>  /  AlkPhos  65  03-22      Culture - Blood (collected 20 Mar 2020 18:00)  Source: .Blood Blood-Peripheral  Preliminary Report (21 Mar 2020 19:01):    No growth to date.    Culture - Blood (collected 20 Mar 2020 18:00)  Source: .Blood Blood-Peripheral  Preliminary Report (21 Mar 2020 19:01):    No growth to date.    Culture - Urine (collected 20 Mar 2020 17:13)  Source: .Urine Clean Catch (Midstream)  Final Report (21 Mar 2020 12:47):    <10,000 CFU/mL Normal Urogenital Ammy        RADIOLOGY & ADDITIONAL TESTS:    Lab Results Reviewed: mild hyponatremia, otherwise stable    Imaging Personally Reviewed:   CXR- increase in right lung patchy opacities likely due to multifocal infection (suspicion for COVID19)       COORDINATION OF CARE:  Care Discussed with Consultants/Other Providers:  MICU team re: hypoxia

## 2020-03-22 NOTE — PROVIDER CONTACT NOTE (CHANGE IN STATUS NOTIFICATION) - BACKGROUND
r/o COVID-19, awaiting reswab from 3/21/20  started plaquenil PO @ 0600 today  overnight, pt utilized NRB (hx JENSEN)

## 2020-03-22 NOTE — PROGRESS NOTE ADULT - PROBLEM SELECTOR PLAN 2
- CT lung findings of bilateral ground glass opacities, lymphopenia with viral illness symptoms very suspicious for COVID-19 infection  - EGDL Qtc 500s, obtain repeat EKG to reassess   - d/w Dr. Dixon, pt would benefit from plaquenil due to worsening respiratory status   - started on CTX, hold off on azithromycin for now as it may prolong Qtc as well  - initial COVID19 neg, d/w ID pt is high risk for will get repeat COVID19 PCR yesterday  - f/u blood cultures 3/20 - negative to date  - flu and RSV NEGATIVE

## 2020-03-22 NOTE — PROGRESS NOTE ADULT - PROBLEM SELECTOR PLAN 1
Currently acute on chronic respiratory failure with hypoxia.   - This morning increased for NRB. Urgent CXR with increasing opacities consistent with worsening PNA (COVID 19?)  - Repeat COVID 19 PCR testing pending from 3/21.   - Plan discussed with patient's spouse by phone  - MICU evaluation appreciated- consider Solu-Medrol IV if COVID19 testing negative, consider broadening antibiotics (+fever, but no leukocytosis)

## 2020-03-22 NOTE — PROVIDER CONTACT NOTE (CHANGE IN STATUS NOTIFICATION) - ASSESSMENT
unable to down titrate to 6L NC (O2 sat 88%)  hypoxic (low to mid 80s) with any activity including position changes in bed on NRB  currently O2 sat 94% on NRB  pt c/o worsening dizzy spells

## 2020-03-22 NOTE — CHART NOTE - NSCHARTNOTEFT_GEN_A_CORE
Chart/Event Note  [facility] 2MON [address]  WYATT GATES, 70y, Male  [id number]    Reason for Notification:   Notified by RN that the above patient had an EKG with concerning computer interpretation.     Events/History of Present Illness  Went immediately to bedside with nursing staff to evaluate patient. On isolation/PPE utilized as per hospital policy. Patient awake, alert, and conversive. Sitting up in bed. Patient reports mild light handedness but denies other complaints. On NRB at 100% FiO2  .chart    Review of Systems:  Constitutional: No fever, chills, or fatigue.  Neurologic: No headache, dizziness, vision/speech changes, numbness, or weakness.  Respiratory: No cough, wheezing, shortness of breath, or dypsnea.   Cardiovascular: No chest pain, pressure, or palpitations.   GI: No abdominal pain, nausea, vomiting, diarrhea, constipation.   : No dysuria, burning, frequency, incontinence, or retention.     T(C): 37.9 (03-22-20 @ 21:00), Max: 37.9 (03-22-20 @ 21:00)  HR: 70 (03-22-20 @ 21:00) (67 - 78)  BP: 178/88 (03-22-20 @ 21:00) (147/78 - 178/88)  RR: 20 (03-22-20 @ 21:00) (20 - 20)  SpO2: 95% (03-22-20 @ 21:00) (88% - 97%)                        14.3   4.36  )-----------( 100      ( 22 Mar 2020 06:48 )             44.3      COVID-19 PCR: NotDetec (03-20-20 @ 17:15)    Physical Examination:  GENERAL: No acute distress noted during examination.   NERVOUS SYSTEM:  Alert & oriented X3 with appropriate concentration. Motor strength is 5/5 in both bilateral upper and lower extremities. No focal or lateralizing neurologic deficits noted.   HEAD: Atraumatic and normocephalic.  EYES: EOMI/PERRLA. Conjunctiva and sclera clear  ENMT: Moist mucous membranes.   NECK: Supple with no JVD.   CHEST: Clear to ascultation bilaterally. No rales, rhonchi, wheezing, or rubs heard. Symmetrical/bilateral chest wall rise.   HEART: Regular rate and rhythm. Normotensive.  ABDOMEN: Soft, nontender, and nondistended.   EXTREMITIES:  2+ peripheral pulses without clubbing, cyanosis, or edema.     Medical Decision Making/Assessment/Plan:  70y-year-old Male with a past medical history of ______ , admitted for ___, now with _____ .     - Diagnosis:      1)   2)   3)   4) Will endorse the above diagnostics and findings to the day medicine team for review and follow up. Will additionally sign out to day medicine teams to follow with relevant specialties.     Tom Landon NP  Department of Medicine   # Chart/Event Note  [facility] 2MON [address]  WYATT GATES, 70y, Male  [id number]    Reason for Notification:   Notified by RN that the above patient had an EKG with concerning computer interpretation.     Events/History of Present Illness:  Patient's chart reviewed. On daily EKGs to monitor QTc. Today's EKG at 1930 had a widened QRS with RBBB and questionable ST segment changes. Comparable to previous EKG from 3/20, though QRS appears wider and there is a possible ST segment changes in V1.   Went immediately to bedside with nursing staff to evaluate patient. On isolation/PPE utilized as per hospital policy. Patient awake, alert, and conversive. Sitting up in bed. Patient reports mild light handedness but denies other complaints. On NRB at 100% FiO2 with SPO2 of 94%. Endorses some shortness of breath, though notes that this has been constant for past two days. Denies chest pain, pressure, or palpitations.     Review of Systems:  Constitutional: No fever, chills, or fatigue.  Neurologic: No headache, dizziness, vision/speech changes, numbness, or weakness.  Respiratory: Positive shortness of breath/dyspnea. No cough or wheezing.   Cardiovascular: No chest pain, pressure, or palpitations.   GI: No abdominal pain, nausea, vomiting, diarrhea, constipation.   : No dysuria, burning, frequency, incontinence, or retention.     T(C): 37.9 (03-22-20 @ 21:00), Max: 37.9 (03-22-20 @ 21:00)  HR: 70 (03-22-20 @ 21:00) (67 - 78)  BP: 178/88 (03-22-20 @ 21:00) (147/78 - 178/88)  RR: 20 (03-22-20 @ 21:00) (20 - 20)  SpO2: 95% (03-22-20 @ 21:00) (88% - 97%)                        14.3   4.36  )-----------( 100      ( 22 Mar 2020 06:48 )             44.3      ABG - ( 22 Mar 2020 20:00 )  pH, Arterial: 7.37  pH, Blood: x     /  pCO2: 48    /  pO2: 83    / HCO3: 27    / Base Excess: 1.6   /  SaO2: 96        COVID-19 PCR: NotDetec (03-20-20 @ 17:15)    Physical Examination:  NERVOUS SYSTEM: Alert & oriented X3 with appropriate concentration.   HEAD: Atraumatic and normocephalic.  EYES: EOMI/PERRLA. Conjunctiva and sclera clear  ENMT: Moist mucous membranes.   NECK: Supple with no JVD.   CHEST: Clear to ascultation bilaterally. Symmetrical/bilateral chest wall rise. Tachypnea to 20. SPO2 94% on NRB at 100% FiO2.   HEART: Regular rate and rhythm. Hypertensive.   ABDOMEN: Soft, nontender, and nondistended. Obese.   EXTREMITIES:  2+ peripheral pulses without clubbing, cyanosis, or edema.     Medical Decision Making/Assessment/Plan:  70y-year-old Male with a past medical history of coronary artery disease, COPD/JENSEN, hypertension, and CKD, admitted for respiratory failure secondary to suspected COVID-19 infection now with possible asymptomatic EKG changes.     - Diagnosis: Abnormal EKG  Computer interpretation of EKG indicated acute MI due to diffuse ST segment changes. Further review shows that this EKG is comparable to patient's EKG on 3/20, and an old one from 2017. Patient also without any active chest pain. Will obtain diagnostics and discuss with Cardiology.   1) Stat labs and cardiac enzymes. Could be potentially elevated in absence of acute MI due to demand from respiratory failure so will obtain delta.   2) Repeat EKG obtained and reviewed, comparable to previous.   3) Discussed with Cardiology Fellow Dr. Crowder whose assistance was appreciated: no STEMI morphology on EKG and low suspicion for ACS.   4) Will endorse the above diagnostics and findings to the day medicine team for review and follow up. Will additionally sign out to day medicine teams to follow with relevant specialties.     Tom Landon NP  Department of Medicine   #[id number] Chart/Event Note  [facility] 2MON [address]  WYATT GATES, 70y, Male  [id number]    Reason for Notification:   Notified by RN that the above patient had an EKG with concerning computer interpretation.     Events/History of Present Illness:  Patient's chart reviewed. On daily EKGs to monitor QTc. Today's EKG at 1930 had a widened QRS with RBBB and questionable ST segment changes. Comparable to previous EKG from 3/20, though QRS appears wider and there is a possible ST segment changes in V1.   Went immediately to bedside with nursing staff to evaluate patient. On isolation/PPE utilized as per hospital policy. Patient awake, alert, and conversive. Sitting up in bed. Patient reports mild light handedness but denies other complaints. On NRB at 100% FiO2 with SPO2 of 94%. Endorses some shortness of breath, though notes that this has been constant for past two days. Denies chest pain, pressure, or palpitations.     Review of Systems:  Constitutional: No fever, chills, or fatigue.  Neurologic: No headache, dizziness, vision/speech changes, numbness, or weakness.  Respiratory: Positive shortness of breath/dyspnea. No cough or wheezing.   Cardiovascular: No chest pain, pressure, or palpitations.   GI: No abdominal pain, nausea, vomiting, diarrhea, constipation.   : No dysuria, burning, frequency, incontinence, or retention.     T(C): 37.9 (03-22-20 @ 21:00), Max: 37.9 (03-22-20 @ 21:00)  HR: 70 (03-22-20 @ 21:00) (67 - 78)  BP: 178/88 (03-22-20 @ 21:00) (147/78 - 178/88)  RR: 20 (03-22-20 @ 21:00) (20 - 20)  SpO2: 95% (03-22-20 @ 21:00) (88% - 97%)                        14.3   4.36  )-----------( 100      ( 22 Mar 2020 06:48 )             44.3      ABG - ( 22 Mar 2020 20:00 )  pH, Arterial: 7.37  pH, Blood: x     /  pCO2: 48    /  pO2: 83    / HCO3: 27    / Base Excess: 1.6   /  SaO2: 96        COVID-19 PCR: NotDetec (03-20-20 @ 17:15)    Physical Examination:  NERVOUS SYSTEM: Alert & oriented X3 with appropriate concentration.   HEAD: Atraumatic and normocephalic.  EYES: EOMI/PERRLA. Conjunctiva and sclera clear  ENMT: Moist mucous membranes.   NECK: Supple with no JVD.   CHEST: Clear to ascultation bilaterally. Symmetrical/bilateral chest wall rise. Tachypnea to 20. SPO2 94% on NRB at 100% FiO2.   HEART: Regular rate and rhythm. Hypertensive.   ABDOMEN: Soft, nontender, and nondistended. Obese.   EXTREMITIES:  2+ peripheral pulses without clubbing, cyanosis, or edema.     Medical Decision Making/Assessment/Plan:  70y-year-old Male with a past medical history of coronary artery disease, COPD/JENSEN, hypertension, and CKD, admitted for respiratory failure secondary to suspected COVID-19 infection now with possible asymptomatic EKG changes.     - Diagnosis: Abnormal EKG  Computer interpretation of EKG indicated acute MI due to diffuse ST segment changes. Further review shows that this EKG is comparable to patient's EKG on 3/20, and an old one from 2017. Patient also without any active chest pain. Will obtain diagnostics and discuss with Cardiology.   1) Stat labs and cardiac enzymes. Could be potentially elevated in absence of acute MI due to demand from respiratory failure so will obtain delta.   2) Repeat EKG obtained and reviewed, comparable to previous.   3) Discussed with Cardiology Fellow Dr. Crowder whose assistance was appreciated: no STEMI morphology on EKG and low suspicion for ACS. Does not meet criteria for transfer to telemetry unit.   4) Will endorse the above diagnostics and findings to the day medicine team for review and follow up. Will additionally sign out to day medicine teams to follow with relevant specialties.     Tom Landon NP  Department of Medicine   #[id number] Chart/Event Note  [facility] 2MON [address]  WYATT GATES, 70y, Male  [id number]    Reason for Notification:   Notified by RN that the above patient had an EKG with concerning computer interpretation.     Events/History of Present Illness:  Patient's chart reviewed. On daily EKGs to monitor QTc. Today's EKG at 1930 had a widened QRS with RBBB and questionable ST segment changes. Comparable to previous EKG from 3/20, though QRS appears wider and there is a possible ST segment changes in V1.   Went immediately to bedside with nursing staff to evaluate patient. On isolation/PPE utilized as per hospital policy. Patient awake, alert, and conversive. Sitting up in bed. Patient reports mild light handedness but denies other complaints. On NRB at 100% FiO2 with SPO2 of 94%. Endorses some shortness of breath, though notes that this has been constant for past two days. Denies chest pain, pressure, or palpitations.     Review of Systems:  Constitutional: No fever, chills, or fatigue.  Neurologic: No headache, dizziness, vision/speech changes, numbness, or weakness.  Respiratory: Positive shortness of breath/dyspnea. No cough or wheezing.   Cardiovascular: No chest pain, pressure, or palpitations.   GI: No abdominal pain, nausea, vomiting, diarrhea, constipation.   : No dysuria, burning, frequency, incontinence, or retention.     T(C): 37.9 (03-22-20 @ 21:00), Max: 37.9 (03-22-20 @ 21:00)  HR: 70 (03-22-20 @ 21:00) (67 - 78)  BP: 178/88 (03-22-20 @ 21:00) (147/78 - 178/88)  RR: 20 (03-22-20 @ 21:00) (20 - 20)  SpO2: 95% (03-22-20 @ 21:00) (88% - 97%)                        14.3   4.36  )-----------( 100      ( 22 Mar 2020 06:48 )             44.3      ABG - ( 22 Mar 2020 20:00 )  pH, Arterial: 7.37  pH, Blood: x     /  pCO2: 48    /  pO2: 83    / HCO3: 27    / Base Excess: 1.6   /  SaO2: 96        COVID-19 PCR: NotDetec (03-20-20 @ 17:15)    Physical Examination:  NERVOUS SYSTEM: Alert & oriented X3 with appropriate concentration.   HEAD: Atraumatic and normocephalic.  EYES: EOMI/PERRLA. Conjunctiva and sclera clear  ENMT: Moist mucous membranes.   NECK: Supple with no JVD.   CHEST: Clear to ascultation bilaterally. Symmetrical/bilateral chest wall rise. Tachypnea to 20. SPO2 94% on NRB at 100% FiO2.   HEART: Regular rate and rhythm. Hypertensive.   ABDOMEN: Soft, nontender, and nondistended. Obese.   EXTREMITIES:  2+ peripheral pulses without clubbing, cyanosis, or edema.     Medical Decision Making/Assessment/Plan:  70y-year-old Male with a past medical history of coronary artery disease, COPD/JENSEN, hypertension, and CKD, admitted for respiratory failure secondary to suspected COVID-19 infection now with possible asymptomatic EKG changes.     - Diagnosis: Abnormal EKG  Computer interpretation of EKG indicated acute MI due to diffuse ST segment changes. Further review shows that this EKG is comparable to patient's EKG on 3/20, and an old one from 2017. Patient also without any active chest pain. Will obtain diagnostics and discuss with Cardiology.   1) Stat labs and cardiac enzymes. Could be potentially elevated in absence of acute MI due to demand from respiratory failure so will obtain delta if first is elevated.   2) Repeat EKG obtained and reviewed, comparable to previous.   3) Discussed with Cardiology Fellow Dr. Crowder whose assistance was appreciated: no STEMI morphology on EKG and low suspicion for ACS. Does not meet criteria for transfer to telemetry unit.   4) Will endorse the above diagnostics and findings to the day medicine team for review and follow up. Will additionally sign out to day medicine teams to follow with relevant specialties.     Tom Landon NP  Department of Medicine   #[id number]

## 2020-03-23 NOTE — PROGRESS NOTE ADULT - PROBLEM SELECTOR PLAN 1
Currently acute on chronic respiratory failure with hypoxia suspect COPD exacerbation due to underlying viral/bacterial pna.  - COVID19 negative x 2 - discussed with ID - would continue with airborne/contact isolation for now given no clear source of current symptoms, no plan for reswab right now  - c/w ceftriaxone iv 3/21- for now  - on nonrebreather - wean down O2 as tolerates  - prednisone 40 mg po daily started  - albuterol MDI q6h standing

## 2020-03-23 NOTE — PROGRESS NOTE ADULT - PROBLEM SELECTOR PLAN 9
DNR/DNI confirmed, MOLST filled out and in the chart  Pt expressed no wish for resuscitation or intubation.  18 min spent advanced care planning today.

## 2020-03-23 NOTE — PROGRESS NOTE ADULT - PROBLEM SELECTOR PLAN 5
- per CTA, No dissection but enlarged 9.2cm   - need close outpatient follow up for AAA repair pending GOC

## 2020-03-23 NOTE — PROGRESS NOTE ADULT - SUBJECTIVE AND OBJECTIVE BOX
Patient is a 70y old  Male who presents with a chief complaint of shortness of breath (22 Mar 2020 15:31)        SUBJECTIVE / OVERNIGHT EVENTS:  overnight no acute events.  still feels sob.  still with cough.  no n/v/f/chills, abd pain.  ambulating but quickly feels SOB.    CAPILLARY BLOOD GLUCOSE        I&O's Summary    22 Mar 2020 07:01  -  23 Mar 2020 07:00  --------------------------------------------------------  IN: 940 mL / OUT: 1250 mL / NET: -310 mL    23 Mar 2020 07:01  -  23 Mar 2020 13:59  --------------------------------------------------------  IN: 50 mL / OUT: 125 mL / NET: -75 mL        Vital Signs Last 24 Hrs  T(C): 37 (23 Mar 2020 12:57), Max: 37.9 (22 Mar 2020 21:00)  T(F): 98.6 (23 Mar 2020 12:57), Max: 100.3 (22 Mar 2020 21:00)  HR: 74 (23 Mar 2020 12:57) (66 - 75)  BP: 150/68 (23 Mar 2020 12:57) (149/72 - 178/88)  BP(mean): --  RR: 22 (23 Mar 2020 12:57) (20 - 22)  SpO2: 93% (23 Mar 2020 12:57) (93% - 100%)    PHYSICAL EXAM:  GENERAL: Obese, M, tachypneic, unable to complete sentence without feeling SOB  HEAD:  NCAT  EYES: PERRLA, conjunctiva clear  NECK: Supple  CHEST/LUNG: wheezing  HEART: Reg rate. Normal S1, S2. No m/r/g.   ABDOMEN: SNTND.  EXTREMITIES:  2+ Peripheral Pulses, No clubbing, cyanosis, edema.  PSYCH:  appropriate affect      LABS:                        14.1   4.05  )-----------( 98       ( 23 Mar 2020 07:22 )             43.5     03-23    128<L>  |  90<L>  |  23  ----------------------------<  111<H>  4.5   |  25  |  1.18    Ca    8.6      23 Mar 2020 07:21    TPro  6.8  /  Alb  3.5  /  TBili  0.8  /  DBili  x   /  AST  42<H>  /  ALT  51<H>  /  AlkPhos  65  03-22      CARDIAC MARKERS ( 22 Mar 2020 21:17 )  x     / x     / 292 U/L / x     / 4.5 ng/mL          Culture - Blood (collected 20 Mar 2020 18:00)  Source: .Blood Blood-Peripheral  Preliminary Report (21 Mar 2020 19:01):    No growth to date.    Culture - Blood (collected 20 Mar 2020 18:00)  Source: .Blood Blood-Peripheral  Preliminary Report (21 Mar 2020 19:01):    No growth to date.    Culture - Urine (collected 20 Mar 2020 17:13)  Source: .Urine Clean Catch (Midstream)  Final Report (21 Mar 2020 12:47):    <10,000 CFU/mL Normal Urogenital Ammy        RADIOLOGY & ADDITIONAL TESTS:  Consultant(s) Notes Reviewed:  micu  Care Discussed with Consultants/Other Providers: Floor NP/PA    MEDICATIONS  (STANDING):  aspirin  chewable 81 milliGRAM(s) Oral daily  atorvastatin 80 milliGRAM(s) Oral at bedtime  budesonide 160 MICROgram(s)/formoterol 4.5 MICROgram(s) Inhaler 2 Puff(s) Inhalation two times a day  buPROPion XL . 150 milliGRAM(s) Oral daily  cefTRIAXone   IVPB 1000 milliGRAM(s) IV Intermittent every 24 hours  diltiazem    milliGRAM(s) Oral daily  heparin  Injectable 5000 Unit(s) SubCutaneous every 12 hours  losartan 50 milliGRAM(s) Oral daily  pantoprazole    Tablet 40 milliGRAM(s) Oral before breakfast  predniSONE   Tablet 40 milliGRAM(s) Oral daily  tamsulosin 0.4 milliGRAM(s) Oral at bedtime    MEDICATIONS  (PRN):  acetaminophen   Tablet .. 650 milliGRAM(s) Oral every 6 hours PRN Temp greater or equal to 38C (100.4F)  ALBUTerol    90 MICROgram(s) HFA Inhaler 2 Puff(s) Inhalation every 6 hours PRN Bronchospasm

## 2020-03-23 NOTE — PROGRESS NOTE ADULT - PROBLEM SELECTOR PLAN 2
CT lung findings of bilateral ground glass opacities, lymphopenia with viral illness symptoms very suspicious for COVID-19 infection  - f/u blood cultures 3/20 - negative to date  - flu and RSV NEGATIVE  - COVID neg x 2.. but still with suspicion

## 2020-03-23 NOTE — PROGRESS NOTE ADULT - PROBLEM SELECTOR PLAN 10
DVT PPX: DVT PPX: hsq incr to 7500 unit q8h    Transitions of Care Status:  1.  Name of PCP: unknown  2.  PCP Contacted on Admission: [ ] Y    [ ] N    3.  PCP contacted at Discharge: [ ] Y    [ ] N    [ ] N/A  4.  Post-Discharge Appointment Date and Location:  5.  Summary of Handoff given to PCP:

## 2020-03-23 NOTE — PROGRESS NOTE ADULT - ASSESSMENT
71 yo M current smoker, COPD and chronic hypoxic respiratory failure on 3-4L home O2, JENSEN, CAD, HTN, CKD stage 3, HLD and AAA presents to the ED with shortness of breath, cough and fevers admitted with acute on chronic hypoxic respiratory failure, COVID negative x2 but still suspicion, with course c/b hyponatremia.

## 2020-03-24 NOTE — CONSULT NOTE ADULT - PROBLEM SELECTOR RECOMMENDATION 9
multifactorial dyspnea/resp failure-copd, PNA, CAD, debility--O 2 sat above 90% multifactorial dyspnea/resp failure-copd, PNA, CAD, debility, ?PE--O 2 sat above 90%  attempt transition to Hi-abebe during the day and bipap o/n  d-dimer--dopplers of LE if elevated

## 2020-03-24 NOTE — CONSULT NOTE ADULT - ATTENDING COMMENTS
as above- as above-  Multifactorial dyspnea-copd exacerbation, PNA, obesity, ?CHF, ? DVT/PE--keep sat 88%  Hypoxemia---can attempt transition to Hiflo-keep sat above 88%, continue bipap o/n w/ supplemental O2  copd-boost steroids to solumedrol 20 q6, symbicort 160 2 bid, spiriva 1 q day, duoneb q 6, incentive           spirometry/acapella  ?CAD--check BNP--if elevated echo, cards evaln     OSAS--bipap o/n and prn  PNA-zosyn/vanco D1/2--check procalcitonin             Nicotine addiction--nicotrol patch etc.  ? PE--check d-dimer--if positive then CTPA                     DVT/GI prophylaxis  PT consult/nutrition consult  Fabian Santoyo MD-Pulmonary   [phone]

## 2020-03-24 NOTE — PROGRESS NOTE ADULT - PROBLEM SELECTOR PLAN 2
on 3-4L O2 at home, PFTs outpatient c/w obstructive lung disease FEV1 63% and restrictive pattern 2/2 obesity  - sees Dr Sue pulm outpatient, d/w him care  - consulted pulm Dr Santoyo to see pt here  - c/w spiriva started here  - c/w albuterol prn, symbicort, and steroids

## 2020-03-24 NOTE — PROGRESS NOTE ADULT - SUBJECTIVE AND OBJECTIVE BOX
Patient is a 70y old  Male who presents with a chief complaint of shortness of breath (23 Mar 2020 13:58)        SUBJECTIVE / OVERNIGHT EVENTS:  overnight no acute events.  this morning, pt desatted to 80s on nonrebreather while brushing teeth and getting ready.  improved while lying in prone position.  pt seen twice - around 10am, was feeling improved.  seen again after placed on bipap, states his breathing feels ok, satting low 90s on 100% FIO2.  no cp, abd pain, n/v.    CAPILLARY BLOOD GLUCOSE        I&O's Summary    23 Mar 2020 07:01  -  24 Mar 2020 07:00  --------------------------------------------------------  IN: 410 mL / OUT: 725 mL / NET: -315 mL    24 Mar 2020 07:01  -  24 Mar 2020 12:53  --------------------------------------------------------  IN: 0 mL / OUT: 100 mL / NET: -100 mL        Vital Signs Last 24 Hrs  T(C): 37.2 (24 Mar 2020 11:26), Max: 37.6 (24 Mar 2020 09:25)  T(F): 99 (24 Mar 2020 11:26), Max: 99.6 (24 Mar 2020 09:25)  HR: 76 (24 Mar 2020 11:26) (61 - 79)  BP: 154/82 (24 Mar 2020 11:26) (120/61 - 154/82)  BP(mean): --  RR: 20 (24 Mar 2020 09:25) (18 - 22)  SpO2: 92% (24 Mar 2020 11:26) (90% - 99%)    PHYSICAL EXAM:  GENERAL: Obese, M, BIPAP on  HEAD:  NCAT  EYES: PERRLA, conjunctiva clear  NECK: Supple  CHEST/LUNG: wheezing, decr lung sounds  HEART: Reg rate. Normal S1, S2. No m/r/g.   ABDOMEN: SNTND.  EXTREMITIES:  2+ Peripheral Pulses, No clubbing, cyanosis, edema.  PSYCH:  appropriate affect    LABS:                        14.0   3.42  )-----------( 109      ( 24 Mar 2020 07:39 )             42.4     03-24    131<L>  |  91<L>  |  24<H>  ----------------------------<  109<H>  4.8   |  27  |  1.18    Ca    8.6      24 Mar 2020 07:39    TPro  6.6  /  Alb  3.4  /  TBili  0.8  /  DBili  0.2  /  AST  57<H>  /  ALT  51<H>  /  AlkPhos  56  03-24      CARDIAC MARKERS ( 22 Mar 2020 21:17 )  x     / x     / 292 U/L / x     / 4.5 ng/mL            RADIOLOGY & ADDITIONAL TESTS:  Care Discussed with Consultants/Other Providers: Floor NP/PA    MEDICATIONS  (STANDING):  ALBUTerol    90 MICROgram(s) HFA Inhaler 2 Puff(s) Inhalation every 6 hours  aspirin  chewable 81 milliGRAM(s) Oral daily  atorvastatin 80 milliGRAM(s) Oral at bedtime  budesonide 160 MICROgram(s)/formoterol 4.5 MICROgram(s) Inhaler 2 Puff(s) Inhalation two times a day  buPROPion XL . 150 milliGRAM(s) Oral daily  diltiazem    milliGRAM(s) Oral daily  heparin  Injectable 7500 Unit(s) SubCutaneous every 8 hours  losartan 50 milliGRAM(s) Oral daily  pantoprazole    Tablet 40 milliGRAM(s) Oral before breakfast  piperacillin/tazobactam IVPB. 3.375 Gram(s) IV Intermittent once  piperacillin/tazobactam IVPB.. 3.375 Gram(s) IV Intermittent every 8 hours  predniSONE   Tablet 40 milliGRAM(s) Oral daily  tamsulosin 0.4 milliGRAM(s) Oral at bedtime  tiotropium 18 MICROgram(s) Capsule 1 Capsule(s) Inhalation daily  vancomycin  IVPB 1250 milliGRAM(s) IV Intermittent every 12 hours    MEDICATIONS  (PRN):  acetaminophen   Tablet .. 650 milliGRAM(s) Oral every 6 hours PRN Temp greater or equal to 38C (100.4F)

## 2020-03-24 NOTE — PROGRESS NOTE ADULT - ASSESSMENT
71 yo M current smoker, COPD and chronic hypoxic respiratory failure on 3-4L home O2, JENSEN, CAD, HTN, CKD stage 3, HLD and AAA presents to the ED with shortness of breath, cough and fevers admitted with acute on chronic hypoxic respiratory failure d/t COPD exacerbation and underlying pneumonia, COVID negative x2, with course c/b hyponatremia.

## 2020-03-24 NOTE — CONSULT NOTE ADULT - PROBLEM SELECTOR RECOMMENDATION 2
change albuterol to duoneb via HHN q 6 change albuterol to duoneb via HHN q 6  change pred to solumedrol 20 q 6, symbicort 160 2 bid, spiriva 1 q day, daliresp 500 q day    incentive spirometry, acapella

## 2020-03-24 NOTE — CONSULT NOTE ADULT - SUBJECTIVE AND OBJECTIVE BOX
HPI: 70M with COPD on home 3L NC, JENSEN on CPAP, CAD, HTN, CKD stage 3, HLD and AAA Originally presented with  shortness of breath and lower back pain. Per chart notes respiratory status has been worsening for the last 2-3 days without associated fevers or chills but reported worsening of chronic cough 2-3 days. The patient was admitted to the Medical team service for further management.  Blood and urine cx and RVP  have been negative.  CT chest noting small rounded GGO. Initial CORVID -19 and was found to be negative. However, test has been rechecked due to worsening respiratory status. Currently the patient is maintained on 100%. Plaquenil and Ceftriaxone have also been initiated. MICU COVID unit consulted 3/22 for worsening hypoxic respiratory failure.      PAST MEDICAL & SURGICAL HISTORY:  JENSEN (obstructive sleep apnea)  HTN (hypertension)  Current smoker: 1ppd X 40 years  Umbilical hernia: 5/2012 surgically treated  Inguinal hernia: [facility]   &#x27; 2007 surgically treated  Herniated lumbar intervertebral disc: surgically treated &#x27; 90&#x27;s.    dx: 10/2012: recurrent herniated Lumbar Discs. Furthur workup to be done after AAA repair  Hyperlipidemia  Heart murmur  Right bundle branch block  CAD (coronary artery disease)  AAA (abdominal aortic aneurysm): dx: 10/2012  S/P laminectomy: &#x27; 90&#x27;s   Lumbar  S/P hernia repair: 5/2012   Umbilical with Mesh  S/P hernia repair: &#x27; 2007   Umbilical with Mesh  S/P tonsillectomy: childhood      FAMILY HISTORY:  FH: hypertension      SOCIAL HISTORY:  Smoking: __ packs x ___ years  EtOH Use:  Marital Status:  Occupation:  Exposures:  Recent Travel:    Allergies    No Known Allergies    Intolerances        HOME MEDICATIONS: Home Medications:  albuterol 90 mcg/inh inhalation aerosol: 2 puff(s) inhaled 3 times a day, As Needed (20 Mar 2020 18:20)  Anoro Ellipta 62.5 mcg-25 mcg/inh inhalation powder: 1 puff(s) inhaled once a day (20 Mar 2020 18:20)  aspirin 81 mg oral tablet, chewable: 1 tab(s) orally once a day (20 Mar 2020 18:20)  atorvastatin 80 mg oral tablet: 1 tab(s) orally once a day (20 Mar 2020 18:20)  dilTIAZem 180 mg/24 hours oral capsule, extended release: 1 cap(s) orally once a day (20 Mar 2020 18:20)  Flomax 0.4 mg oral capsule: 1 cap(s) orally once a day (20 Mar 2020 18:20)  irbesartan-hydroCHLOROthiazide 150mg-12.5mg oral tablet: 1 tab(s) orally once a day    NOTE: LAST FILLED ON AUG 2019 for 90 days supply (20 Mar 2020 18:20)  PriLOSEC 20 mg oral delayed release capsule: 1 cap(s) orally once a day (20 Mar 2020 18:20)  Wellbutrin  mg/24 hours oral tablet, extended release: 1 tab(s) orally once a day    NOTE: LAST FILLED ON FEB 8 2020 (20 Mar 2020 18:20)  Zetia 10 mg oral tablet: 1 tab(s) orally once a day (20 Mar 2020 18:20)      REVIEW OF SYSTEMS:  Constitutional: No fevers or chills. No weight loss. No fatigue or generalized malaise.  Eyes: No itching or discharge from the eyes  ENT: No ear pain. No ear discharge. No nasal congestion. No post nasal drip. No epistaxis. No throat pain. No sore throat. No difficulty swallowing.   CV: No chest pain. No palpitations. No lightheadedness or dizziness.   Resp: No dyspnea at rest. No dyspnea on exertion. No orthopnea. No wheezing. No cough. No stridor. No sputum production. No chest pain with respiration.  GI: No nausea. No vomiting. No diarrhea.  MSK: No joint pain or pain in any extremities  Integumentary: No skin lesions. No pedal edema.  Neurological: No gross motor weakness. No sensory changes.    [ ] All other systems negative  [ ] Unable to assess ROS because ________    OBJECTIVE:  ICU Vital Signs Last 24 Hrs  T(C): 37.3 (24 Mar 2020 12:30), Max: 37.6 (24 Mar 2020 09:25)  T(F): 99.1 (24 Mar 2020 12:30), Max: 99.6 (24 Mar 2020 09:25)  HR: 75 (24 Mar 2020 12:30) (61 - 79)  BP: 152/84 (24 Mar 2020 12:30) (120/61 - 154/82)  BP(mean): --  ABP: --  ABP(mean): --  RR: 20 (24 Mar 2020 12:30) (18 - 22)  SpO2: 92% (24 Mar 2020 12:30) (90% - 99%)        03-23 @ 07:01  - 03-24 @ 07:00  --------------------------------------------------------  IN: 410 mL / OUT: 725 mL / NET: -315 mL    03-24 @ 07:01 - 03-24 @ 13:45  --------------------------------------------------------  IN: 0 mL / OUT: 200 mL / NET: -200 mL      CAPILLARY BLOOD GLUCOSE          PHYSICAL EXAM:  General: Awake, alert, oriented X 3.   HEENT: Atraumatic, normocephalic.                 Mallampatti Grade                 No nasal congestion.                No tonsillar or pharyngeal exudates.  Lymph Nodes: No palpable lymphadenopathy  Neck: No JVD. No carotid bruit.   Respiratory: Normal chest expansion                         Normal percussion                         Normal and equal air entry                         No wheeze, rhonchi or rales.  Cardiovascular: S1 S2 normal. No murmurs, rubs or gallops.   Abdomen: Soft, non-tender, non-distended. No organomegaly. Normoactive bowel sounds.  Extremities: Warm to touch. Peripheral pulse palpable. No pedal edema.   Skin: No rashes or skin lesions  Neurological: Motor and sensory examination equal and normal in all four extremities.  Psychiatry: Appropriate mood and affect.    HOSPITAL MEDICATIONS:  MEDICATIONS  (STANDING):  ALBUTerol    90 MICROgram(s) HFA Inhaler 2 Puff(s) Inhalation every 6 hours  aspirin  chewable 81 milliGRAM(s) Oral daily  atorvastatin 80 milliGRAM(s) Oral at bedtime  budesonide 160 MICROgram(s)/formoterol 4.5 MICROgram(s) Inhaler 2 Puff(s) Inhalation two times a day  buPROPion XL . 150 milliGRAM(s) Oral daily  diltiazem    milliGRAM(s) Oral daily  heparin  Injectable 7500 Unit(s) SubCutaneous every 8 hours  losartan 50 milliGRAM(s) Oral daily  pantoprazole    Tablet 40 milliGRAM(s) Oral before breakfast  piperacillin/tazobactam IVPB.. 3.375 Gram(s) IV Intermittent every 8 hours  predniSONE   Tablet 40 milliGRAM(s) Oral daily  tamsulosin 0.4 milliGRAM(s) Oral at bedtime  tiotropium 18 MICROgram(s) Capsule 1 Capsule(s) Inhalation daily  vancomycin  IVPB 1250 milliGRAM(s) IV Intermittent every 12 hours    MEDICATIONS  (PRN):  acetaminophen   Tablet .. 650 milliGRAM(s) Oral every 6 hours PRN Temp greater or equal to 38C (100.4F)      LABS:                        14.0   3.42  )-----------( 109      ( 24 Mar 2020 07:39 )             42.4     03-24    131<L>  |  91<L>  |  24<H>  ----------------------------<  109<H>  4.8   |  27  |  1.18    Ca    8.6      24 Mar 2020 07:39    TPro  6.6  /  Alb  3.4  /  TBili  0.8  /  DBili  0.2  /  AST  57<H>  /  ALT  51<H>  /  AlkPhos  56  03-24        Arterial Blood Gas:  03-22 @ 20:00  7.37/48/83/27/96/1.6  ABG lactate: --    Venous Blood Gas:  03-22 @ 21:08  7.33/56/40/29/72  VBG Lactate: 1.1      MICROBIOLOGY:     RADIOLOGY: < from: CT Chest w/ IV Cont (03.20.20 @ 14:37) >    PROCEDURE:   CT Angiography of the Chest, Abdomen and Pelvis.   Precontrast imaging was performed through the chest followed by arterial phase imaging of the chest, abdomen and pelvis.  Intravenous contrast: 90 ml Omnipaque 350. 10 ml discarded.  Oral contrast: None.  Sagittal and coronal reformats were performed as well as 3D (MIP) reconstructions.    FINDINGS:    CHEST:     LUNGS AND LARGE AIRWAYS: Patent central airways. There are patchy bilateral, rounded groundglass opacities. Left basilar subsegmental atelectasis.  PLEURA: No pleural effusion.  VESSELS: Atherosclerotic disease of the thoracic aorta.  HEART: Heart size is normal. No pericardial effusion. Coronary artery calcifications.  MEDIASTINUM AND JAYNE: No lymphadenopathy.  CHEST WALL AND LOWER NECK: Within normal limits.    ABDOMEN AND PELVIS:    LIVER: Within normal limits.  BILE DUCTS: Normal caliber.  GALLBLADDER: Cholelithiasis. No pericholecystic fluid or gallbladder wall thickening.  SPLEEN: Within normal limits.  PANCREAS: Within normal limits.  ADRENALS: Within normal limits.  KIDNEYS/URETERS: Within normal limits.    BLADDER: Within normal limits.  REPRODUCTIVE ORGANS: Prostatic calcifications. Prostate measures 5.2 cm in transverse dimension.    BOWEL: Colonic diverticula. No evidence of diverticulitis. No bowel obstruction. Appendix is normal.  PERITONEUM: No ascites.  VESSELS: No aortic dissection or intramural hematoma identified. [id number] stent in place. Abdominal aortic aneurysm measuring 9.2 cm in diameter, which is increased from 8.3 cm in diameter. Atherosclerotic disease.  RETROPERITONEUM/LYMPH NODES: No lymphadenopathy.    ABDOMINAL WALL: Fat-containing left inguinal hernia.  BONES: Scoliosis and mild degenerative change of the spine.    IMPRESSION:     No evidence of aortic dissection or intramural hematoma.    Abdominal aortic aneurysm has increased in sized from 8.3 cm in diameter to 9.2 cm since 4/8/2019.    < from: Xray Chest 1 View- PORTABLE-Urgent (03.24.20 @ 12:08) >  INTERPRETATION:  Portable chest xray: hypoxia    Comparison: Most recent prior     FINDINGS:    Lines/Tubes: In expected locations    Heart and mediastinum:  Unchanged in appearance.    Lungs, pleura, and airways: Patchy bilateral airspace opacities have increased since previous exam    Bones and soft tissues: The bones and soft tissues are unchanged.    Impression:    Increasing patchy bilateral airspace opacities compared with prior      < end of copied text >    [ ] Reviewed and interpreted by me    Point of Care Ultrasound Findings;    PFT:    EKG: HPI: 70M with COPD on home 3L NC, JENSEN on CPAP, CAD, HTN, CKD stage 3, HLD and AAA Originally presented with  shortness of breath and lower back pain. Per chart notes respiratory status has been worsening for the last 2-3 days without associated fevers or chills but reported worsening of chronic cough 2-3 days. The patient was admitted to the Medical team service for further management.  Blood and urine cx and RVP  have been negative.  CT chest noting small rounded GGO. Initial COVID -19 and was found to be negative. However, test has been rechecked due to worsening respiratory status. Currently the patient is maintained on 100%. Plaquenil and Ceftriaxone have also been initiated. MICU COVID unit consulted 3/22 for worsening hypoxic respiratory failure.  Over past 2 days slightly better but no ambulation to properly assess; some mucus production.      PAST MEDICAL & SURGICAL HISTORY:  JENSEN (obstructive sleep apnea)  HTN (hypertension)  Current smoker: 1ppd X 40 years  Umbilical hernia: 5/2012 surgically treated  Inguinal hernia: [facility]   &#x27; 2007 surgically treated  Herniated lumbar intervertebral disc: surgically treated &#x27; 90&#x27;s.    dx: 10/2012: recurrent herniated Lumbar Discs. Furthur workup to be done after AAA repair  Hyperlipidemia  Heart murmur  Right bundle branch block  CAD (coronary artery disease)  AAA (abdominal aortic aneurysm): dx: 10/2012  S/P laminectomy: &#x27; 90&#x27;s   Lumbar  S/P hernia repair: 5/2012   Umbilical with Mesh  S/P hernia repair: &#x27; 2007   Umbilical with Mesh  S/P tonsillectomy: childhood      FAMILY HISTORY:  FH: hypertension  M-uterine ca, HTN, F-??    SOCIAL HISTORY:  Smoking: _2_ packs x _50__ years  EtOH Use: soc  Marital Status: M-2 kids  Occupation: , sanitiation  Exposures: none  Recent Travel: none    Allergies    No Known Allergies    Intolerances        HOME MEDICATIONS: Home Medications:  albuterol 90 mcg/inh inhalation aerosol: 2 puff(s) inhaled 3 times a day, As Needed (20 Mar 2020 18:20)  Anoro Ellipta 62.5 mcg-25 mcg/inh inhalation powder: 1 puff(s) inhaled once a day (20 Mar 2020 18:20)  aspirin 81 mg oral tablet, chewable: 1 tab(s) orally once a day (20 Mar 2020 18:20)  atorvastatin 80 mg oral tablet: 1 tab(s) orally once a day (20 Mar 2020 18:20)  dilTIAZem 180 mg/24 hours oral capsule, extended release: 1 cap(s) orally once a day (20 Mar 2020 18:20)  Flomax 0.4 mg oral capsule: 1 cap(s) orally once a day (20 Mar 2020 18:20)  irbesartan-hydroCHLOROthiazide 150mg-12.5mg oral tablet: 1 tab(s) orally once a day    NOTE: LAST FILLED ON AUG 2019 for 90 days supply (20 Mar 2020 18:20)  PriLOSEC 20 mg oral delayed release capsule: 1 cap(s) orally once a day (20 Mar 2020 18:20)  Wellbutrin  mg/24 hours oral tablet, extended release: 1 tab(s) orally once a day    NOTE: LAST FILLED ON FEB 8 2020 (20 Mar 2020 18:20)  Zetia 10 mg oral tablet: 1 tab(s) orally once a day (20 Mar 2020 18:20)      REVIEW OF SYSTEMS:  Constitutional: No fevers or chills. No weight loss. + fatigue or generalized malaise.  Eyes: No itching or discharge from the eyes  ENT: No ear pain. No ear discharge. No nasal congestion. No post nasal drip. No epistaxis. No throat pain. No sore throat. No difficulty swallowing.   CV: No chest pain. No palpitations. No lightheadedness or dizziness.   Resp: No dyspnea at rest. + dyspnea on exertion. No orthopnea. No wheezing. + cough. No stridor. + sputum production. No chest pain with respiration.  GI: No nausea. No vomiting. No diarrhea.  MSK: No joint pain or pain in any extremities  Integumentary: No skin lesions. No pedal edema.  Neurological: + gross motor weakness. No sensory changes.    [+ ] All other systems negative  [ ] Unable to assess ROS because ________    OBJECTIVE:  ICU Vital Signs Last 24 Hrs  T(C): 37.3 (24 Mar 2020 12:30), Max: 37.6 (24 Mar 2020 09:25)  T(F): 99.1 (24 Mar 2020 12:30), Max: 99.6 (24 Mar 2020 09:25)  HR: 75 (24 Mar 2020 12:30) (61 - 79)  BP: 152/84 (24 Mar 2020 12:30) (120/61 - 154/82)  BP(mean): --  ABP: --  ABP(mean): --  RR: 20 (24 Mar 2020 12:30) (18 - 22)  SpO2: 92% (24 Mar 2020 12:30) (90% - 99%)        03-23 @ 07:01  - 03-24 @ 07:00  --------------------------------------------------------  IN: 410 mL / OUT: 725 mL / NET: -315 mL    03-24 @ 07:01  - 03-24 @ 13:45  --------------------------------------------------------  IN: 0 mL / OUT: 200 mL / NET: -200 mL      CAPILLARY BLOOD GLUCOSE          PHYSICAL EXAM: NAD in bed on bipap-obesity  General: Awake, alert, oriented X 3.   HEENT: Atraumatic, normocephalic.                 Mallampatti Grade 3                No nasal congestion.                No tonsillar or pharyngeal exudates.  Lymph Nodes: No palpable lymphadenopathy  Neck: No JVD. No carotid bruit.   Respiratory: abormal chest expansion                         Normal percussion                         Normal and equal air entry                         + exp wheeze, no rhonchi but left over right basilar rales.  Cardiovascular: S1 S2 normal. No murmurs, rubs or gallops.   Abdomen: Soft, non-tender, non-distended. No organomegaly. Normoactive bowel sounds.  Extremities: Warm to touch. Peripheral pulse palpable. No pedal edema.   Skin: No rashes or skin lesions  Neurological: Motor and sensory examination equal and normal in all four extremities.  Psychiatry: Appropriate mood and affect.    HOSPITAL MEDICATIONS:  MEDICATIONS  (STANDING):  ALBUTerol    90 MICROgram(s) HFA Inhaler 2 Puff(s) Inhalation every 6 hours  aspirin  chewable 81 milliGRAM(s) Oral daily  atorvastatin 80 milliGRAM(s) Oral at bedtime  budesonide 160 MICROgram(s)/formoterol 4.5 MICROgram(s) Inhaler 2 Puff(s) Inhalation two times a day  buPROPion XL . 150 milliGRAM(s) Oral daily  diltiazem    milliGRAM(s) Oral daily  heparin  Injectable 7500 Unit(s) SubCutaneous every 8 hours  losartan 50 milliGRAM(s) Oral daily  pantoprazole    Tablet 40 milliGRAM(s) Oral before breakfast  piperacillin/tazobactam IVPB.. 3.375 Gram(s) IV Intermittent every 8 hours  predniSONE   Tablet 40 milliGRAM(s) Oral daily  tamsulosin 0.4 milliGRAM(s) Oral at bedtime  tiotropium 18 MICROgram(s) Capsule 1 Capsule(s) Inhalation daily  vancomycin  IVPB 1250 milliGRAM(s) IV Intermittent every 12 hours    MEDICATIONS  (PRN):  acetaminophen   Tablet .. 650 milliGRAM(s) Oral every 6 hours PRN Temp greater or equal to 38C (100.4F)      LABS:                        14.0   3.42  )-----------( 109      ( 24 Mar 2020 07:39 )             42.4     03-24    131<L>  |  91<L>  |  24<H>  ----------------------------<  109<H>  4.8   |  27  |  1.18    Ca    8.6      24 Mar 2020 07:39    TPro  6.6  /  Alb  3.4  /  TBili  0.8  /  DBili  0.2  /  AST  57<H>  /  ALT  51<H>  /  AlkPhos  56  03-24        Arterial Blood Gas:  03-22 @ 20:00  7.37/48/83/27/96/1.6  ABG lactate: --    Venous Blood Gas:  03-22 @ 21:08  7.33/56/40/29/72  VBG Lactate: 1.1      MICROBIOLOGY:     RADIOLOGY: < from: CT Chest w/ IV Cont (03.20.20 @ 14:37) >    PROCEDURE:   CT Angiography of the Chest, Abdomen and Pelvis.   Precontrast imaging was performed through the chest followed by arterial phase imaging of the chest, abdomen and pelvis.  Intravenous contrast: 90 ml Omnipaque 350. 10 ml discarded.  Oral contrast: None.  Sagittal and coronal reformats were performed as well as 3D (MIP) reconstructions.    FINDINGS:    CHEST:     LUNGS AND LARGE AIRWAYS: Patent central airways. There are patchy bilateral, rounded groundglass opacities. Left basilar subsegmental atelectasis.  PLEURA: No pleural effusion.  VESSELS: Atherosclerotic disease of the thoracic aorta.  HEART: Heart size is normal. No pericardial effusion. Coronary artery calcifications.  MEDIASTINUM AND JAYNE: No lymphadenopathy.  CHEST WALL AND LOWER NECK: Within normal limits.    ABDOMEN AND PELVIS:    LIVER: Within normal limits.  BILE DUCTS: Normal caliber.  GALLBLADDER: Cholelithiasis. No pericholecystic fluid or gallbladder wall thickening.  SPLEEN: Within normal limits.  PANCREAS: Within normal limits.  ADRENALS: Within normal limits.  KIDNEYS/URETERS: Within normal limits.    BLADDER: Within normal limits.  REPRODUCTIVE ORGANS: Prostatic calcifications. Prostate measures 5.2 cm in transverse dimension.    BOWEL: Colonic diverticula. No evidence of diverticulitis. No bowel obstruction. Appendix is normal.  PERITONEUM: No ascites.  VESSELS: No aortic dissection or intramural hematoma identified. [id number] stent in place. Abdominal aortic aneurysm measuring 9.2 cm in diameter, which is increased from 8.3 cm in diameter. Atherosclerotic disease.  RETROPERITONEUM/LYMPH NODES: No lymphadenopathy.    ABDOMINAL WALL: Fat-containing left inguinal hernia.  BONES: Scoliosis and mild degenerative change of the spine.    IMPRESSION:     No evidence of aortic dissection or intramural hematoma.    Abdominal aortic aneurysm has increased in sized from 8.3 cm in diameter to 9.2 cm since 4/8/2019.    < from: Xray Chest 1 View- PORTABLE-Urgent (03.24.20 @ 12:08) >  INTERPRETATION:  Portable chest xray: hypoxia    Comparison: Most recent prior     FINDINGS:    Lines/Tubes: In expected locations    Heart and mediastinum:  Unchanged in appearance.    Lungs, pleura, and airways: Patchy bilateral airspace opacities have increased since previous exam    Bones and soft tissues: The bones and soft tissues are unchanged.    Impression:    Increasing patchy bilateral airspace opacities compared with prior      < end of copied text >    [ ] Reviewed and interpreted by me    Point of Care Ultrasound Findings;    PFT:    EKG:

## 2020-03-24 NOTE — PROGRESS NOTE ADULT - PROBLEM SELECTOR PLAN 9
Pt has now reversed MOLST form and is NOW FULL CODE and wants trial of intubation  19 min spent advanced care planning today.

## 2020-03-24 NOTE — CONSULT NOTE ADULT - PROBLEM SELECTOR RECOMMENDATION 4
multiple meds--atorvastatin, diltiazem, losartan  formal cards evaln multiple meds--atorvastatin, diltiazem, losartan  formal cards evaln-check BNP

## 2020-03-24 NOTE — PROGRESS NOTE ADULT - PROBLEM SELECTOR PLAN 10
DVT PPX: hsq 7500 unit q8h    Transitions of Care Status:  1.  Name of PCP: Dr Sue  2.  PCP Contacted on Admission: [x] Y    [ ] N    3.  PCP contacted at Discharge: [ ] Y    [ ] N    [ ] N/A  4.  Post-Discharge Appointment Date and Location:  5.  Summary of Handoff given to PCP: d/w on 3/24

## 2020-03-24 NOTE — CONSULT NOTE ADULT - ASSESSMENT
70M with COPD on home 3L NC, JENSEN on CPAP, CAD, HTN, CKD stage 3, HLD and AAA admitted with worsening chronic cough and SOB, found to have acute on chronic hypoxic respiratory failure  initial COVID- 19 (-) 70M with COPD on home 3L NC, JENSEN on CPAP, CAD, HTN, CKD stage 3, HLD and AAA admitted with worsening chronic cough and SOB, found to have acute on chronic hypoxic respiratory failure  initial and f/up COVID- 19 (-)--currently SOB--acute upon chronic resp failure--copd flair and bibasilar PNA Left over right.

## 2020-03-24 NOTE — CHART NOTE - NSCHARTNOTEFT_GEN_A_CORE
Patient is a 70y old  Male who presents with a chief complaint of shortness of breath (24 Mar 2020 13:44)  Received pt from [facility] on 100%.  Pulse oximetry on NRB is 92%.  Pt is morbidly obese and a current smoker.     HPI:    Vital Signs Last 24 Hrs  T(C): 37.3 (24 Mar 2020 12:30), Max: 37.6 (24 Mar 2020 09:25)  T(F): 99.1 (24 Mar 2020 12:30), Max: 99.6 (24 Mar 2020 09:25)  HR: 76 (24 Mar 2020 16:55) (61 - 77)  BP: 152/84 (24 Mar 2020 12:30) (120/61 - 154/82)  BP(mean): --  RR: 21 (24 Mar 2020 16:55) (18 - 21)  SpO2: 93% (24 Mar 2020 16:55) (90% - 99%)    Gen: 71 y/o M morbidly obese, in respiratory distress.   Skin:  Cool and flushed.   Resp:  Crackles noted in upper lung field.  Diminished breath sounds in 1/2 bilateral lower lung fields.    CV: S1S2   Chest:  Pt utilizing accessory muscles.     Laboratory:  CARDIAC MARKERS ( 22 Mar 2020 21:17 )  x     / x     / 292 U/L / x     / 4.5 ng/mL                            14.0   3.42  )-----------( 109      ( 24 Mar 2020 07:39 )             42.4       BNP: Serum Pro-Brain Natriuretic Peptide: 466 pg/mL (03-22 @ 21:17)    Impression:   COPD  Possible PNA  COVID negative    Plan:  Pt placed on BIPAP 100%.     Seen by Pulmonary, Dr. Santoyo.   Pt later placed on 100% Hi-Flow.   Start Solumedrol,   Start Daliresp.   Continue IV Antibiotics.   D/C Proventil HFA and change to standing dose of Duonebs.   At 1400:  Pt was placed on 100% Hi-Flow.  Pt desaturated to 75%.  Pt immediately  returned to 100% BIPAP.   Will continue to monitor closely.        Vero Rodrigues DNP,ANP-BC  Spectralink #[id number]

## 2020-03-24 NOTE — PROGRESS NOTE ADULT - PROBLEM SELECTOR PLAN 1
Currently acute on chronic respiratory failure with hypoxia suspect COPD exacerbation due to underlying viral/bacterial pna.  - COVID19 negative x 2 - d/c'ed isolation as d/w Dr Mendoza ID  - c/w ceftriaxone iv 3/21- 3/23, switch to vanco/zosyn 3/24- given lack of improvement on current abx  - monitor vanco trough  - on nonrebreather, BIPAP prn now, f/u ABG  - prednisone 40 mg po daily 3/21-  - albuterol MDI q6h standing  - started spiriva, c/w symbicort

## 2020-03-25 NOTE — AIRWAY PLACEMENT NOTE ADULT - POST AIRWAY PLACEMENT ASSESSMENT:
positive end tidal CO2 noted/CXR pending/chest excursion noted/breath sounds bilateral/breath sounds equal

## 2020-03-25 NOTE — DIETITIAN INITIAL EVALUATION ADULT. - REASON INDICATOR FOR ASSESSMENT
Pt seen for LOS in CCU.   Source: medical chart, pt intubated/sedated at this time    Pt admitted c SOB, loose stools, pt c acute respiratory failure, S/P RRT overnight and now intubated/sedated. Noted per nursing notes, OGT is in place. pt found to be COVID-19 positive.

## 2020-03-25 NOTE — DIETITIAN INITIAL EVALUATION ADULT. - ENTERAL
If and when able to start tube feeds: 1. If/when Propofol less than 20ml/hr x24 hours, would consider Vital AF c a goal rate of 65ml/hr x24 hours (1560ml total volume, 1872cal, 117 Gm Prot) and one packet of No Prosource TF, in total pt would receive: 1912cal, 128 Gm Prot; ~15cal/kg based on wt of 124.4kg and 1.7 Gm Prot/kg based on wt of 75.2kg). 2. If pt to remain on Propofol at levels higher than 20ml/hr x24 hours, see below in additional recommendations

## 2020-03-25 NOTE — PROGRESS NOTE ADULT - SUBJECTIVE AND OBJECTIVE BOX
WYATT GATES   MRN#: [id number]     The patient is a 70y Male who was seen, evaluated, & examined with the ICU staff with a multidisciplinary care plan formulated & implemented.  All available clinical, laboratory, radiographic, pharmacologic, and electrocardiographic data were reviewed & analyzed.      The patient was in the ICU in critical condition secondary to:     persistent cardiopulmonary dysfunction  vasodilatory shock    For support and evaluation & prevention of further decompensation secondary to persistent cardiopulmonary dysfunction, respiratory status required:     supplemental oxygen with full ventilatory support / mechanical ventilation  continuous pulse oximetry monitoring  following ABGs with A-line monitoring  IV Propofol infusion  IV Fentanyl infusion     Invasive hemodynamic monitoring with     an A-line was required for continuous MAP/BP monitoring     to ensure adequate cardiovascular support and to evaluate for & help prevent decompensation while receiving     IV Levophed infusion  IV Vasopressin infusion    secondary to     vasodilatory shock    In addition:  Respiratory failure in the setting of COVID+  DNR; rescinded DNI, now intubated   Vasoplegic with pressor requirement  Family has said despite rescinding DNI he would not want to be intubated   Will not escalate care per wishes of family  May terminally extubate    The patient required critical care management and I personally provided 30 minutes of non-continuous care to the patient, excluding separate procedures, in addition to  discussing the patient and plan at length with the CTICU staff and helping coordinate care.

## 2020-03-25 NOTE — PROCEDURE NOTE - NSINDICATIONS_GEN_A_CORE
blood sampling/cannulation purposes/monitoring purposes/critical patient/arterial puncture to obtain ABG's
emergency venous access/critical illness/hemodynamic monitoring/volume resuscitation/venous access/transvenous pacemaker insertion

## 2020-03-25 NOTE — CONSULT NOTE ADULT - SUBJECTIVE AND OBJECTIVE BOX
DUE TO COVID 19 AND IN ORDER TO DECREASE PROVIDERS EXPOSURE AND USAGE OF PPE, H&P ROS OR PE WAS TAKEN FROM PRIMARY TEAM NOTE.        HPI:  Patient is a 69 year old current smoker male with JENSEN on home o2, CAD, HTN, CKD stage 3, HLD and AAA presents to the ED with shortness of breath and lower back pain. He remarks that shortness of breath has been worsening for the last 2-3 days. He does endorse subjective fevers, chills. He also admits to dry cough that he has chronically but it has worsened for the last 2-3 days. He denies recent travel or sick contacts. He has had 1-2 loose bowel movements today. He endorses profound weakness; denies near syncope or syncope. He denies pain/ burning with urination, diarrhea, chest pain, palpitations. (20 Mar 2020 18:34)    PERTINENT PM/SXH:   JENSEN (obstructive sleep apnea)  HTN (hypertension)  Current smoker  Umbilical hernia  Inguinal hernia  Herniated lumbar intervertebral disc  Hyperlipidemia  Heart murmur  Right bundle branch block  CAD (coronary artery disease)  AAA (abdominal aortic aneurysm)    S/P laminectomy  S/P hernia repair  S/P hernia repair  S/P tonsillectomy    FAMILY HISTORY:  FH: hypertension    ITEMS NOT CHECKED ARE NOT PRESENT    SOCIAL HISTORY:   Significant other/partner[x ]  Children[ ]  Pentecostal/Spirituality:Voodoo  Substance hx:  [ ]   Tobacco hx:  [ ]   Alcohol hx: [ ]   Home Opioid hx:  [ ] I-Stop Reference No:  Living Situation: [ x]Home  [ ]Long term care  [ ]Rehab [ ]Other    ADVANCE DIRECTIVES:    DNR  Yes  Yes  MOLST  [x ]  Living Will  [ ]   DECISION MAKER(s):  [ ] Health Care Proxy(s)  [x ] Surrogate(s)  [ ] Guardian           Name(s): Phone Number(s):  JAQUELINE MARQUES   BASELINE (I)ADL(s) (prior to admission):  [place]: [ ]Total  [X ] Moderate [ ]Dependent    Allergies    No Known Allergies    Intolerances    MEDICATIONS  (STANDING):  aspirin  chewable 81 milliGRAM(s) Oral daily  atorvastatin 80 milliGRAM(s) Oral at bedtime  azithromycin  IVPB      buPROPion XL . 150 milliGRAM(s) Oral daily  chlorhexidine 0.12% Liquid 15 milliLiter(s) Oral Mucosa every 12 hours  famotidine  IVPB 20 milliGRAM(s) IV Intermittent two times a day  fentaNYL   Infusion. 0.5 MICROgram(s)/kG/Hr (3.11 mL/Hr) IV Continuous <Continuous>  heparin  Injectable 7500 Unit(s) SubCutaneous every 8 hours  hydroxychloroquine 200 milliGRAM(s) Oral two times a day  ipratropium 17 MICROgram(s) HFA Inhaler 1 Puff(s) Inhalation every 6 hours  norepinephrine Infusion 0.1 MICROgram(s)/kG/Min (23.3 mL/Hr) IV Continuous <Continuous>  propofol Infusion 75 MICROgram(s)/kG/Min (56 mL/Hr) IV Continuous <Continuous>  roflumilast 500 MICROGram(s) Oral daily  tamsulosin 0.4 milliGRAM(s) Oral at bedtime  vasopressin Infusion 0.1 Unit(s)/Min (6 mL/Hr) IV Continuous <Continuous>    MEDICATIONS  (PRN):  acetaminophen   Tablet .. 650 milliGRAM(s) Oral every 6 hours PRN Temp greater or equal to 38C (100.4F)  ALBUTerol    90 MICROgram(s) HFA Inhaler 2 Puff(s) Inhalation every 4 hours PRN Shortness of Breath and/or Wheezing    PRESENT SYMPTOMS: [ X]Unable to obtain due to poor mentation   Source if other than patient:  [ ]Family   [ ]Team     Pain: [ ]yes [ ]no  QOL impact -   Location -                    Aggravating factors -  Quality -  Radiation -  Timing-  Severity (0-10 scale):  Minimal acceptable level (0-10 scale):       DUE TO COVID 19 AND IN ORDER TO DECREASE PROVIDERS EXPOSURE AND USAGE OF PPE, H&P ROS OR PE WAS TAKEN FROM PRIMARY TEAM NOTE.        PAIN AD Score:     [email or web address] (press ctrl +  left click to view)    Dyspnea:                           [ ]Mild [ ]Moderate [ ]Severe  Anxiety:                             [ ]Mild [ ]Moderate [ ]Severe  Fatigue:                             [ ]Mild [ ]Moderate [ ]Severe  Nausea:                             [ ]Mild [ ]Moderate [ ]Severe  Loss of appetite:              [ ]Mild [ ]Moderate [ ]Severe  Constipation:                    [ ]Mild [ ]Moderate [ ]Severe    Other Symptoms:  [ ]All other review of systems negative     Palliative Performance Status Version 2:   10      %    http://npcrc.org/files/news/palliative_performance_scale_ppsv2.pdf  PHYSICAL EXAM:  Vital Signs Last 24 Hrs  T(C): 37.3 (25 Mar 2020 09:00), Max: 37.4 (25 Mar 2020 04:00)  T(F): 99.1 (25 Mar 2020 09:00), Max: 99.4 (25 Mar 2020 04:00)  HR: 44 (25 Mar 2020 13:15) (44 - 79)  BP: 115/63 (25 Mar 2020 13:00) (85/46 - 164/80)  BP(mean): 81 (25 Mar 2020 13:00) (60 - 81)  RR: 11 (25 Mar 2020 13:15) (11 - 30)  SpO2: 93% (25 Mar 2020 13:15) (66% - 98%) I&O's Summary    24 Mar 2020 07:01  -  25 Mar 2020 07:00  --------------------------------------------------------  IN: 413 mL / OUT: 970 mL / NET: -557 mL    25 Mar 2020 07:01  -  25 Mar 2020 14:48  --------------------------------------------------------  IN: 474 mL / OUT: 160 mL / NET: 314 mL      DUE TO COVID 19 AND IN ORDER TO DECREASE PROVIDERS EXPOSURE AND USAGE OF PPE, H&P ROS OR PE WAS TAKEN FROM PRIMARY TEAM NOTE.        GENERAL:  [ ]Alert  [ ]Oriented x   [ ]Lethargic  [ ]Cachexia  [x ]Unarousable  [ ]Verbal  [x ]Non-Verbal  Behavioral:   [ ] Anxiety  [ ] Delirium [ ] Agitation [ ] Other  HEENT:  [ ]Normal   [ ]Dry mouth   [x ]ET Tube/Trach  [ ]Oral lesions  PULMONARY:   [ ]Clear [ ]Tachypnea  [ ]Audible excessive secretions   [ ]Rhonchi        [ ]Right [ ]Left [ ]Bilateral  [ ]Crackles        [ ]Right [ ]Left [ ]Bilateral  [ ]Wheezing     [ ]Right [ ]Left [ ]Bilatera  [ ]Diminished breath sounds [ ]right [ ]left [ ]bilateral  CARDIOVASCULAR:    [ ]Regular [ x]Irregular [ ]Tachy  [ ]Jay [ ]Murmur [ ]Other  GASTROINTESTINAL:  [x ]Soft  [ ]Distended   [x ]+BS  [ ]Non tender [ ]Tender  [ ]PEG [ ]OGT/ NGT  Last BM:     GENITOURINARY:  [ ]Normal [ ] Incontinent   [ ]Oliguria/Anuria   [ x][place]  MUSCULOSKELETAL:   [ ]Normal   [ ]Weakness  [ x]Bed/Wheelchair bound [ ]Edema  NEUROLOGIC:   [ ]No focal deficits  [ x]Cognitive impairment  [ ]Dysphagia [ ]Dysarthria [ ]Paresis [ ]Other   SKIN:   [ ]Normal    [ ]Rash  [ ]Pressure ulcer(s)       Present on admission [ ]y [ ]n    CRITICAL CARE:  [ ] Shock Present  [ ]Septic [x ]Cardiogenic [ ]Neurologic [ ]Hypovolemic  [ ]  Vasopressors [ ]  Inotropes   [x ]Respiratory failure present [x ]Mechanical ventilation [ ]Non-invasive ventilatory support [ ]High flow  [ ]Acute  [ ]Chronic [ ]Hypoxic  [ ]Hypercarbic [ ]Other  [ ]Other organ failure     LABS:                        13.8   5.33  )-----------( 136      ( 25 Mar 2020 03:56 )             43.2   03-25    130<L>  |  91<L>  |  33<H>  ----------------------------<  148<H>  4.9   |  25  |  1.67<H>    Ca    8.5      25 Mar 2020 03:56  Phos  3.8     03-25  Mg     2.2     03-25    TPro  6.5  /  Alb  3.2<L>  /  TBili  1.2  /  DBili  x   /  AST  72<H>  /  ALT  54<H>  /  AlkPhos  56  03-25  PT/INR - ( 25 Mar 2020 03:57 )   PT: 12.0 sec;   INR: 1.04 ratio         PTT - ( 25 Mar 2020 01:22 )  PTT:55.2 sec      RADIOLOGY & ADDITIONAL STUDIES:    < from: CT Chest w/ IV Cont (03.20.20 @ 14:37) >    INTERPRETATION:  CLINICAL INFORMATION: COPD and shortness of breath.    COMPARISON: Chest CT dated 8/9/2017.    PROCEDURE:   CT Angiography of the Chest, Abdomen and Pelvis.   Precontrast imaging was performed through the chest followed by arterial phase imaging of the chest, abdomen and pelvis.  Intravenous contrast: 90 ml Omnipaque 350. 10 ml discarded.  Oral contrast: None.  Sagittal and coronal reformats were performed as well as 3D (MIP) reconstructions.    FINDINGS:    CHEST:     LUNGS AND LARGE AIRWAYS: Patent central airways. There are patchy bilateral, rounded groundglass opacities. Left basilar subsegmental atelectasis.  PLEURA: No pleural effusion.  VESSELS: Atherosclerotic disease of the thoracic aorta.  HEART: Heart size is normal. No pericardial effusion. Coronary artery calcifications.  MEDIASTINUM AND JAYNE: No lymphadenopathy.  CHEST WALL AND LOWER NECK: Within normal limits.    ABDOMEN AND PELVIS:    LIVER: Within normal limits.  BILE DUCTS: Normal caliber.  GALLBLADDER: Cholelithiasis. No pericholecystic fluid or gallbladder wall thickening.  SPLEEN: Within normal limits.  PANCREAS: Within normal limits.  ADRENALS: Within normal limits.  KIDNEYS/URETERS: Within normal limits.    BLADDER: Within normal limits.  REPRODUCTIVE ORGANS: Prostatic calcifications. Prostate measures 5.2 cm in transverse dimension.    BOWEL: Colonic diverticula. No evidence of diverticulitis. No bowel obstruction. Appendix is normal.  PERITONEUM: No ascites.  VESSELS: No aortic dissection or intramural hematoma identified. [id number] stent in place. Abdominal aortic aneurysm measuring 9.2 cm in diameter, which is increased from 8.3 cm in diameter. Atherosclerotic disease.  RETROPERITONEUM/LYMPH NODES: No lymphadenopathy.    ABDOMINAL WALL: Fat-containing left inguinal hernia.  < from: CT Chest w/ IV Cont (03.20.20 @ 14:37) >  BONES: Scoliosis and mild degenerative change of the spine.    IMPRESSION:     No evidence of aortic dissection or intramural hematoma.    Abdominal aortic aneurysm has increased in sized from 8.3 cm in diameter to 9.2 cm since 4/8/2019.    Patchy bilateral, peripheral groundglass opacities. Differential this finding includes infection, including atypical infection such as viral pneumonia. If COVID-19 is suspected, correlate with RT-PCR.    Findings were discussed with TAMARA Dawkins 3/20/2020 3:12 PM by Dr. Gregorio with read back confirmation.              < end of copied text >    < end of copied text >        PROTEIN CALORIE MALNUTRITION PRESENT: [ ]mild [ ]moderate [ ]severe [ ]underweight [ ]morbid obesity  https://www.andeal.org/vault/2440/web/files/ONC/Table_Clinical%20Characteristics%20to%20Document%20Malnutrition-White%20JV%20et%20al%540919.pdf    Height (cm): 177.8 (03-21-20 @ 05:45), 177.8 (04-08-19 @ 19:17)  Weight (kg): 124.4 (03-21-20 @ 05:45), 123.8 (04-08-19 @ 19:17)  BMI (kg/m2): 39.4 (03-21-20 @ 05:45), 39.2 (04-08-19 @ 19:17)    [ ]PPSV2 < or = to 30% [ ]significant weight loss  [ ]poor nutritional intake  [ ]anasarca     Albumin, Serum: 3.2 g/dL (03-25-20 @ 03:56)   [ ]Artificial Nutrition      REFERRALS:   [ ]Chaplaincy  [ ]Hospice  [ ]Child Life  [ ]Social Work  [ ]Case management [ ]Holistic Therapy     Goals of Care Document:

## 2020-03-25 NOTE — DIETITIAN INITIAL EVALUATION ADULT. - PHYSICAL APPEARANCE
other (specify) Skin: no pressure injuries   Edema: +1 generalized edema, rhys. ankle    ht: 5'10", wt: 274.2 pounds, BMI: 39.3 kg/m2, IBW: 166 pounds +/- 10%

## 2020-03-25 NOTE — CHART NOTE - NSCHARTNOTEFT_GEN_A_CORE
CCU Accept Note    Transfer from:   ( x ) Telemetry- [facility]       HPI:  Patient is a 70 M with COPD on home 3L NC, JENSEN on CPAP, CAD, HTN, CKD stage 3, HLD and AAA who initially presented 3/20 c/o worsening SOB over 2-3 days associated with worsening of chronic non-productive cough and malaise. Patient denied fever, chills, recent travel, or sick contacts. He was admitted to the medical service. RVP, blood/urine cultures were negative. 3/20 CT chest revealing of bilateral patchy peripheral GGO. COVID testing was negative. During hospitalization patients respiratory status continued to decline prompting second COVID testing which was also negative. Of note, patient has remained afebrile since 3/21 and had two fevers for entire hospital course. He completed 3 days of Plaquenil (d/c when testing was negative). He was placed on abx for bacterial PNA coverage and IV steroids/nebs for COPD exacerbation. 3/25 s/p RRT for hypoxemia with sats as low as 85% on bipap 100%. PO2 was 54 on ABG and patient subsequently intubated for acute hypoxic respiratory failure. Bedside POCUS during RRT revealing of  patchy B lines more prominent on left side. No gross consolidation on U/S. Cardiac exam: no evidence of septal flattening and RV not bigger than LV.   Patient will be admitted to CCU-COVID ICU for further care.       Vital Signs Last 24 Hrs  T(C): 36.8 (24 Mar 2020 21:16), Max: 37.6 (24 Mar 2020 09:25)  T(F): 98.3 (24 Mar 2020 21:16), Max: 99.6 (24 Mar 2020 09:25)  HR: 66 (25 Mar 2020 03:52) (65 - 79)  BP: 164/80 (24 Mar 2020 21:16) (139/68 - 164/80)  BP(mean): --  RR: 20 (24 Mar 2020 21:55) (18 - 23)  SpO2: 88% (25 Mar 2020 03:52) (88% - 95%)  I&O's Summary    23 Mar 2020 07:01  -  24 Mar 2020 07:00  --------------------------------------------------------  IN: 410 mL / OUT: 725 mL / NET: -315 mL    24 Mar 2020 07:01  -  25 Mar 2020 04:11  --------------------------------------------------------  IN: 100 mL / OUT: 850 mL / NET: -750 mL        MEDICATIONS  (STANDING):  aspirin  chewable 81 milliGRAM(s) Oral daily  atorvastatin 80 milliGRAM(s) Oral at bedtime  azithromycin  IVPB      azithromycin  IVPB 500 milliGRAM(s) IV Intermittent once  buPROPion XL . 150 milliGRAM(s) Oral daily  chlorhexidine 0.12% Liquid 15 milliLiter(s) Oral Mucosa every 12 hours  fentaNYL    Injectable 50 MICROGram(s) IV Push once  fentaNYL   Infusion. 0.5 MICROgram(s)/kG/Hr (3.11 mL/Hr) IV Continuous <Continuous>  heparin  Injectable 7500 Unit(s) SubCutaneous every 8 hours  hydroxychloroquine 200 milliGRAM(s) Oral two times a day  ipratropium 17 MICROgram(s) HFA Inhaler 1 Puff(s) Inhalation every 6 hours  norepinephrine Infusion 0.1 MICROgram(s)/kG/Min (23.3 mL/Hr) IV Continuous <Continuous>  pantoprazole  Injectable 40 milliGRAM(s) IV Push daily  piperacillin/tazobactam IVPB.. 3.375 Gram(s) IV Intermittent every 8 hours  propofol Infusion 75 MICROgram(s)/kG/Min (56 mL/Hr) IV Continuous <Continuous>  roflumilast 500 MICROGram(s) Oral daily  tamsulosin 0.4 milliGRAM(s) Oral at bedtime  vancomycin  IVPB 1250 milliGRAM(s) IV Intermittent every 12 hours    MEDICATIONS  (PRN):  acetaminophen   Tablet .. 650 milliGRAM(s) Oral every 6 hours PRN Temp greater or equal to 38C (100.4F)  ALBUTerol    90 MICROgram(s) HFA Inhaler 2 Puff(s) Inhalation every 4 hours PRN Shortness of Breath and/or Wheezing      LABS                                            14.0                  Neurophils% (auto):   x      (03-25 @ 00:53):    4.76 )-----------(123          Lymphocytes% (auto):  x                                             43.3                   Eosinphils% (auto):   x        Manual%: Neutrophils x    ; Lymphocytes x    ; Eosinophils x    ; Bands%: x    ; Blasts x                                    132    |  94     |  33                  Calcium: 9.1   / iCa: x      (03-25 @ 00:53)    ----------------------------<  133       Magnesium: 2.2                              4.8     |  25     |  1.31             Phosphorous: 2.6      TPro  6.8    /  Alb  3.5    /  TBili  1.2    /  DBili  x      /  AST  72     /  ALT  56     /  AlkPhos  57     25 Mar 2020 00:53    ( 03-25 @ 01:22 )   PT: 12.3 sec;   INR: 1.07 ratio  aPTT: 55.2 sec      Telemetry: NSR 60s  EKG: NSR 61 bpm, , RBBB     3/20 CT chest: No evidence of aortic dissection or intramural hematoma. Abdominal aortic aneurysm has increased in sized from 8.3 cm in diameter to 9.2 cm since 4/8/2019. Patchy bilateral, peripheral groundglass opacities. Differential this finding includes infection, including atypical infection such as viral pneumonia.      ASSESSMENT & PLAN:   70 M w/ PMH COPD on home 3L NC, JENSEN on CPAP, CAD, HTN, CKD stage 3, HLD and AAA initially presented 3/20 c/o worsening SOB over 2-3 days associated with worsening of chronic non-productive cough and malaise. CT chest revealing of bilateral patchy peripheral GGO. COVID testing negative x2 and patient was being treated on floors for COPD exacerbation/PNA with ABX, IV steroids, and nebs. Respiratory continued to worsen throughout course, s/p RRT 3/25 for hypoxemia to 85% on bipap 100%. Patient subsequently intubated for acute hypoxic respiratory failure and transferred to CCU-COVID ICU for repeat testing.     Neuro  - intubated and sedated with propofol and fent gtt  - neuro checks per protocol   - will continue to assess need for paralytics     Pulm  R/O COVID, Superimposed PNA vs COPD exacerbation   - repeat COVID testing sent   - patient remained hypoxic to mid 80s upon arrival to CCU on PEEP 15 and FiO2 100%. Mode changed to BiVent with improvement of sats to low 90s.   - Monitor ABGs and vent synchrony closely, may need to prone   - Cont Plaquenil for 2 more days to complete 5 day course  - add azithro to abx regimen total of 5 days   - cont vanc/zosyn for superimposed PNA   - cont nebs for COPD, d/c steroids for now until COVID results   - daily CXR   - Bedside POCUS during RRT revealing of  patchy B lines more prominent on left side. No gross consolidation on U/S.  - Once respiratory status stabilizes consider CTA to r/o PE    Cardiac  - Bedside cardiac POCUS: no evidence of septal flattening and RV not bigger than LV  - Wean levo as tolerated for MAP goal 65  - Cont ASA and statin     Renal  - mathias in place  - strict I/Os    GI  - Maintain NPO  - Place enteral tube   - PPI    ID  R/O COVID, PNA   - afebrile since 3/21  - RVP, blood cultures, U/A, urine cultures negative   - Cont Azithro and Plaquenil for suspected COVID   - Cont broad spectrum abx (vanc/zosyn) for ?superimposed bacterial PNA  - will send covid labs and repeat blood culture x2      Heme  - HSQ for DVT ppx  - monitor plts     Lines  - right fem TLC   - right fem art line  - PIV   - ETT     GOC  - pt rescinded DNR on 3/24        Crystal Claudio, CCU PA #[id number] CCU Accept Note    Transfer from:   ( x ) Telemetry- [facility]       HPI:  Patient is a 70 M with COPD on home 3L NC, JENSEN on CPAP, CAD, HTN, CKD stage 3, HLD and AAA who initially presented 3/20 c/o worsening SOB over 2-3 days associated with worsening of chronic non-productive cough and malaise. Patient denied fever, chills, recent travel, or sick contacts. He was admitted to the medical service. RVP, blood/urine cultures were negative. 3/20 CT chest revealing of bilateral patchy peripheral GGO. COVID testing was negative. During hospitalization patients respiratory status continued to decline prompting second COVID testing which was also negative. Of note, patient has remained afebrile since 3/21 and had two fevers for entire hospital course. He completed 3 days of Plaquenil (d/c when testing was negative). He was placed on abx for bacterial PNA coverage and IV steroids/nebs for COPD exacerbation. 3/25 s/p RRT for hypoxemia with sats as low as 85% on bipap 100%. PO2 was 54 on ABG and patient subsequently intubated for acute hypoxic respiratory failure. Bedside POCUS during RRT revealing of  patchy B lines more prominent on left side. No gross consolidation on U/S. Cardiac exam: no evidence of septal flattening and RV not bigger than LV.   Patient will be admitted to CCU-COVID ICU for further care.       Vital Signs Last 24 Hrs  T(C): 36.8 (24 Mar 2020 21:16), Max: 37.6 (24 Mar 2020 09:25)  T(F): 98.3 (24 Mar 2020 21:16), Max: 99.6 (24 Mar 2020 09:25)  HR: 66 (25 Mar 2020 03:52) (65 - 79)  BP: 164/80 (24 Mar 2020 21:16) (139/68 - 164/80)  BP(mean): --  RR: 20 (24 Mar 2020 21:55) (18 - 23)  SpO2: 88% (25 Mar 2020 03:52) (88% - 95%)  I&O's Summary    23 Mar 2020 07:01  -  24 Mar 2020 07:00  --------------------------------------------------------  IN: 410 mL / OUT: 725 mL / NET: -315 mL    24 Mar 2020 07:01  -  25 Mar 2020 04:11  --------------------------------------------------------  IN: 100 mL / OUT: 850 mL / NET: -750 mL        MEDICATIONS  (STANDING):  aspirin  chewable 81 milliGRAM(s) Oral daily  atorvastatin 80 milliGRAM(s) Oral at bedtime  azithromycin  IVPB      azithromycin  IVPB 500 milliGRAM(s) IV Intermittent once  buPROPion XL . 150 milliGRAM(s) Oral daily  chlorhexidine 0.12% Liquid 15 milliLiter(s) Oral Mucosa every 12 hours  fentaNYL    Injectable 50 MICROGram(s) IV Push once  fentaNYL   Infusion. 0.5 MICROgram(s)/kG/Hr (3.11 mL/Hr) IV Continuous <Continuous>  heparin  Injectable 7500 Unit(s) SubCutaneous every 8 hours  hydroxychloroquine 200 milliGRAM(s) Oral two times a day  ipratropium 17 MICROgram(s) HFA Inhaler 1 Puff(s) Inhalation every 6 hours  norepinephrine Infusion 0.1 MICROgram(s)/kG/Min (23.3 mL/Hr) IV Continuous <Continuous>  pantoprazole  Injectable 40 milliGRAM(s) IV Push daily  piperacillin/tazobactam IVPB.. 3.375 Gram(s) IV Intermittent every 8 hours  propofol Infusion 75 MICROgram(s)/kG/Min (56 mL/Hr) IV Continuous <Continuous>  roflumilast 500 MICROGram(s) Oral daily  tamsulosin 0.4 milliGRAM(s) Oral at bedtime  vancomycin  IVPB 1250 milliGRAM(s) IV Intermittent every 12 hours    MEDICATIONS  (PRN):  acetaminophen   Tablet .. 650 milliGRAM(s) Oral every 6 hours PRN Temp greater or equal to 38C (100.4F)  ALBUTerol    90 MICROgram(s) HFA Inhaler 2 Puff(s) Inhalation every 4 hours PRN Shortness of Breath and/or Wheezing      LABS                                            14.0                  Neurophils% (auto):   x      (03-25 @ 00:53):    4.76 )-----------(123          Lymphocytes% (auto):  x                                             43.3                   Eosinphils% (auto):   x        Manual%: Neutrophils x    ; Lymphocytes x    ; Eosinophils x    ; Bands%: x    ; Blasts x                                    132    |  94     |  33                  Calcium: 9.1   / iCa: x      (03-25 @ 00:53)    ----------------------------<  133       Magnesium: 2.2                              4.8     |  25     |  1.31             Phosphorous: 2.6      TPro  6.8    /  Alb  3.5    /  TBili  1.2    /  DBili  x      /  AST  72     /  ALT  56     /  AlkPhos  57     25 Mar 2020 00:53    ( 03-25 @ 01:22 )   PT: 12.3 sec;   INR: 1.07 ratio  aPTT: 55.2 sec      Telemetry: NSR 60s  EKG: NSR 61 bpm, , RBBB     3/20 CT chest: No evidence of aortic dissection or intramural hematoma. Abdominal aortic aneurysm has increased in sized from 8.3 cm in diameter to 9.2 cm since 4/8/2019. Patchy bilateral, peripheral groundglass opacities. Differential this finding includes infection, including atypical infection such as viral pneumonia.      ASSESSMENT & PLAN:   70 M w/ PMH COPD on home 3L NC, JENSEN on CPAP, CAD, HTN, CKD stage 3, HLD and AAA initially presented 3/20 c/o worsening SOB over 2-3 days associated with worsening of chronic non-productive cough and malaise. CT chest revealing of bilateral patchy peripheral GGO. COVID testing negative x2 and patient was being treated on floors for COPD exacerbation/PNA with ABX, IV steroids, and nebs. Respiratory continued to worsen throughout course, s/p RRT 3/25 for hypoxemia to 85% on bipap 100%. Patient subsequently intubated for acute hypoxic respiratory failure and transferred to CCU-COVID ICU for repeat testing.     Neuro  - intubated and sedated with propofol and fent gtt  - neuro checks per protocol   - will continue to assess need for paralytics     Pulm  R/O COVID, Superimposed PNA vs COPD exacerbation   - repeat COVID testing sent   - patient remained hypoxic to mid 80s upon arrival to CCU on PEEP 15 and FiO2 100%. Mode changed to BiVent with improvement of sats to low 90s.   - Monitor ABGs and vent synchrony closely, may need to prone   - Cont Plaquenil for 2 more days to complete 5 day course  - add azithro to abx regimen total of 5 days   - cont vanc/zosyn for superimposed PNA   - cont nebs for COPD, d/c steroids for now until COVID results   - daily CXR   - Bedside POCUS during RRT revealing of  patchy B lines more prominent on left side. No gross consolidation on U/S.  - Once respiratory status stabilizes consider CTA to r/o PE    Cardiac  - Bedside cardiac POCUS: no evidence of septal flattening and RV not bigger than LV  - Wean levo as tolerated for MAP goal 65  - Cont ASA and statin     Renal  - mathias in place  - strict I/Os    GI  - Maintain NPO  - Place enteral tube   - PPI    ID  R/O COVID, PNA   - afebrile since 3/21  - RVP, blood cultures, U/A, urine cultures negative   - Cont Azithro and Plaquenil for suspected COVID   - Cont broad spectrum abx (vanc/zosyn) for ?superimposed bacterial PNA  - will send covid labs and repeat blood culture x2      Heme  - HSQ for DVT ppx  - monitor plts     Lines  - right fem TLC   - right fem art line  - PIV   - ETT     Dispo/GOC  - pt rescinded DNR on 3/24  - I spoke to patients wife via telephone and updated her regarding overnight events. She was adamant that patient would not have wanted to be on a respirator. I explained to her that until COVID results come back negative, no visitors were allowed. She stated she was going to bust through the hospital with . I provided emotional support.         Crystal Claudio, CCU PA #[id number]

## 2020-03-25 NOTE — CONSULT NOTE ADULT - ASSESSMENT
69 yo M current smoker, COPD and chronic hypoxic respiratory failure on 3-4L home O2, JENSEN, CAD, HTN, CKD stage 3, HLD and AAA presents to the ED with shortness of breath, cough and fevers admitted with acute on chronic hypoxic respiratory failure d/t COPD exacerbation and underlying pneumonia, COVID + called for goals of care

## 2020-03-25 NOTE — DIETITIAN INITIAL EVALUATION ADULT. - OTHER INFO
Unable to conduct nutrition-focused physical exam at this time due to limited contact restrictions related to their medical condition and isolation precautions. Given current COVID-19 pandemic, no visitors at this time.     Per chart, pt c NKFA. No micronutrient coverage noted per H&P.    Noted admit wt of 274.2 pounds (on admit pt only c +1 rhys. ankle edema). Noted wt of 227 pounds as per previous RD note from 2013.

## 2020-03-25 NOTE — RAPID RESPONSE TEAM SUMMARY - NSSITUATIONBACKGROUNDRRT_GEN_ALL_CORE
Patient is a 70 M with COPD on home 3L NC, JENSEN on CPAP, CAD, HTN, CKD stage 3, HLD and AAA who initially presented 3/20 c/o worsening SOB over 2-3 days associated with worsening of chronic non-productive cough and malaise. Patient was found to have ground glass opacifications on CT and was admitted for r/o COVID. Patient had negative PCR x2 and was transferred to a general med floor. Throughout 3/24, patient with worsening hypoxia and was excalated to HFNC then BiPAP.    RRT called for worsening hypoxia. Upon entering the room, patient resting comfortably on BiPAP 14/8 FiO2 100% with O2 satting at 85%. Pertinent physical exam findings include diffuse expiratory wheezes.  Patient ordered for duonebs x2 with no improvement in his oxygenation. Stat CXR obtained which revealed worsening bilateral patchy opacities. Given the worsening imaging findings and increasing O2 requirements, there was concern for COVID. Patient taken off BiPAP and placed on 100% NRB however continued to sat 85%. ABG obtained which revealed PO2 of 54. GOC conversation then had with patient and patient wished for full called. Anesthesia called and patient intubated. Patient started on prop gtt for sedation and Levo gtt for BP support. Patient continued to be difficult to adequately and required IV prop 50 mcg, and Fentanyl 100mcg x2. Patient transferred to CCU for further management.

## 2020-03-25 NOTE — PROGRESS NOTE ADULT - SUBJECTIVE AND OBJECTIVE BOX
CHIEF COMPLAINT: f/up resp failure, copd, PNA-? covid19 (neg x 2 thus far), osas--now intubated due to decline    Interval Events: intubation tx to covid19 unit    REVIEW OF SYSTEMS:  Constitutional: No fevers or chills. No weight loss. No fatigue or generalized malaise.  Eyes: No itching or discharge from the eyes  ENT: No ear pain. No ear discharge. No nasal congestion. No post nasal drip. No epistaxis. No throat pain. No sore throat. No difficulty swallowing.   CV: No chest pain. No palpitations. No lightheadedness or dizziness.   Resp: No dyspnea at rest. No dyspnea on exertion. No orthopnea. No wheezing. No cough. No stridor. No sputum production. No chest pain with respiration.  GI: No nausea. No vomiting. No diarrhea.  MSK: No joint pain or pain in any extremities  Integumentary: No skin lesions. No pedal edema.  Neurological: No gross motor weakness. No sensory changes.  [ ] All other systems negative  [+ ] Unable to assess ROS because _sedation_______    OBJECTIVE:  ICU Vital Signs Last 24 Hrs  T(C): 37.4 (25 Mar 2020 04:00), Max: 37.6 (24 Mar 2020 09:25)  T(F): 99.4 (25 Mar 2020 04:00), Max: 99.6 (24 Mar 2020 09:25)  HR: 54 (25 Mar 2020 06:30) (54 - 79)  BP: 113/55 (25 Mar 2020 06:30) (85/46 - 164/80)  BP(mean): 73 (25 Mar 2020 06:30) (60 - 81)  ABP: 118/62 (25 Mar 2020 04:00) (104/56 - 118/62)  ABP(mean): 80 (25 Mar 2020 04:00) (56 - 80)  RR: 12 (25 Mar 2020 06:30) (12 - 30)  SpO2: 96% (25 Mar 2020 06:30) (66% - 98%)    Mode: APRV (Biphasic), FiO2: 100, PEEP: 0, PS: 0, ITime: 5, MAP: 27, P-High: 29, P-Low: 0, T-High: 4.1, T-Low: 0.8, PIP: 32    03-23 @ 07:01 - 03-24 @ 07:00  --------------------------------------------------------  IN: 410 mL / OUT: 725 mL / NET: -315 mL    03-24 @ 07:01 - 03-25 @ 06:43  --------------------------------------------------------  IN: 100 mL / OUT: 970 mL / NET: -870 mL      CAPILLARY BLOOD GLUCOSE      POCT Blood Glucose.: 119 mg/dL (25 Mar 2020 00:32)      PHYSICAL EXAM:  General: on vent sedated  HEENT: Atraumatic, normocephalic.                 Mallampatti Grade 3                No nasal congestion.                No tonsillar or pharyngeal exudates.  Lymph Nodes: No palpable lymphadenopathy  Neck: No JVD. No carotid bruit.   Respiratory: Normal chest expansion                         Normal percussion                         Normal and equal air entry                         No wheeze, rhonchi but bibasilar rales.  Cardiovascular: S1 S2 normal. No murmurs, rubs or gallops.   Abdomen: Soft, non-tender, non-distended. No organomegaly. Normoactive bowel sounds.  Extremities: Warm to touch. Peripheral pulse palpable. No pedal edema.   Skin: No rashes or skin lesions  Neurological: Motor and sensory examination equal and normal in all four extremities.  Psychiatry: Appropriate mood and affect.    HOSPITAL MEDICATIONS:  MEDICATIONS  (STANDING):  aspirin  chewable 81 milliGRAM(s) Oral daily  atorvastatin 80 milliGRAM(s) Oral at bedtime  azithromycin  IVPB      buPROPion XL . 150 milliGRAM(s) Oral daily  chlorhexidine 0.12% Liquid 15 milliLiter(s) Oral Mucosa every 12 hours  fentaNYL   Infusion. 0.5 MICROgram(s)/kG/Hr (3.11 mL/Hr) IV Continuous <Continuous>  heparin  Injectable 7500 Unit(s) SubCutaneous every 8 hours  hydroxychloroquine 200 milliGRAM(s) Oral two times a day  ipratropium 17 MICROgram(s) HFA Inhaler 1 Puff(s) Inhalation every 6 hours  norepinephrine Infusion 0.1 MICROgram(s)/kG/Min (23.3 mL/Hr) IV Continuous <Continuous>  pantoprazole  Injectable 40 milliGRAM(s) IV Push daily  piperacillin/tazobactam IVPB.. 3.375 Gram(s) IV Intermittent every 8 hours  propofol Infusion 75 MICROgram(s)/kG/Min (56 mL/Hr) IV Continuous <Continuous>  roflumilast 500 MICROGram(s) Oral daily  tamsulosin 0.4 milliGRAM(s) Oral at bedtime  vancomycin  IVPB 1250 milliGRAM(s) IV Intermittent every 12 hours    MEDICATIONS  (PRN):  acetaminophen   Tablet .. 650 milliGRAM(s) Oral every 6 hours PRN Temp greater or equal to 38C (100.4F)  ALBUTerol    90 MICROgram(s) HFA Inhaler 2 Puff(s) Inhalation every 4 hours PRN Shortness of Breath and/or Wheezing      LABS:                        13.8   5.33  )-----------( 136      ( 25 Mar 2020 03:56 )             43.2     03-25    130<L>  |  91<L>  |  33<H>  ----------------------------<  148<H>  4.9   |  25  |  1.67<H>    Ca    8.5      25 Mar 2020 03:56  Phos  3.8     03-25  Mg     2.2     03-25    TPro  6.5  /  Alb  3.2<L>  /  TBili  1.2  /  DBili  x   /  AST  72<H>  /  ALT  54<H>  /  AlkPhos  56  03-25    PT/INR - ( 25 Mar 2020 03:57 )   PT: 12.0 sec;   INR: 1.04 ratio         PTT - ( 25 Mar 2020 01:22 )  PTT:55.2 sec    Arterial Blood Gas:  03-25 @ 06:01  7.28/58/97/26/96/-1.3  ABG lactate: --  Arterial Blood Gas:  03-25 @ 03:50  7.22/74/68/29/90/-.2  ABG lactate: --  Arterial Blood Gas:  03-25 @ 00:47  7.41/49/54/30/87/5.2  ABG lactate: --  Arterial Blood Gas:  03-25 @ 00:12  7.42/41/57/26/89/2.3  ABG lactate: --        MICROBIOLOGY:     RADIOLOGY:  [ ] Reviewed and interpreted by me    Point of Care Ultrasound Findings:    PFT:    EKG:

## 2020-03-25 NOTE — PROGRESS NOTE ADULT - ATTENDING COMMENTS
as above-  Multifactorial dyspnea-copd exacerbation, PNA, obesity, ?CHF, ? DVT/PE--keep sat 88%--now resp failure requiring intubation  Hypoxemia---resp failure on vent now--bilevel resp support--titrate fio2 down as able-sat above 88%  copd-boost steroids to solumedrol 20 q6, duoneb q 6, incentive             ?CAD--check BNP--if elevated echo, cards evaln     OSAS--bipap o/n and prn  PNA-zosyn/vanco D2/3--added zithromax and plaquenil for ?covid19 (despite neg x 2)            Nicotine addiction--nicotrol patch etc.  ? PE--+ d-dimer--if positive -? doppler vs empiric lovenox rx                    DVT/GI prophylaxis      [facility] conference  Fabian Santoyo MD-Pulmonary   [phone]

## 2020-03-25 NOTE — CHART NOTE - NSCHARTNOTEFT_GEN_A_CORE
70M with COPD on home 3L NC, JENSEN on CPAP, CAD, HTN, CKD stage 3, HLD and AAA Originally presented with  shortness of breath and lower back pain. Per chart notes respiratory status has been worsening for the last 2-3 days without associated fevers or chills but reported worsening of chronic cough 2-3 days. The patient was admitted to the Medical team service for further management.  Blood and urine cx and RVP  have been negative.  CT chest noting small rounded GGO. Initial COVID -19 and was found to be negative. However, test has been rechecked due to worsening respiratory status. Currently the patient is maintained on 100%. Plaquenil and Ceftriaxone have also been initiated. MICU COVID unit consulted 3/22 for worsening hypoxic respiratory failure.  Over past 2 days slightly better but no ambulation to properly assess; some mucus production.    Consult: This is a 71 y/o M with hx of 3L NC, JENSEN on CPAP, ?Hx of ILD, CAD, HTN, CKD 3, HLD and AAA who presents on 3/20 with shortness of breath and low back pain. Patient has had worsening respiratory status throughout the admission with worsening of his chronic cough. Has had Flu and RVP panel negative on admission with two negative COVID 19 tests (3/20 and 3/21). CT chest on 3/20 with patchy b/l peripheral GGO. MICU consulted on 3/22 for hypoxia: recommended Azithromycin and solumdrol. Antibiotics have since been broadened to vanc/zosyn with worsening shortness of breath and patchy opacities on CXR. However has been afebrile since 3/21.     Patient on floors and had O2 sat of 86 with good waveform on Bipap. AB.41/49/54/30/87. Placed on NRB and still satting in the 80s -- intubated for hypoxemia.   Bedside POCUS: patchy B lines more prominent on left side. No gross consolidation on U/S. Cardiac exam: no evidence of septal flattening and RV not bigger than LV.     Recommendations:   - check CBC w/diff, CMP/Mg/Phos, T-cell subset, Coags, D-dimer, Procalcitonin, ESR, CRP, LDH, Ferritin, lactate, ABG and G6PD  - would start with CTA once stable from oxygenation standpoint to assess for PE, patient already had two negative COVIDs   - Recommend RR 18 FIO2 100% PEEP 16 and TV by Ideal Body Weight 6-8cc/kg (480 for 6cc) and repeat ABG   - Agree with vanc/zosyn would restart Azithromycin, check EKG 70M with COPD on home 3L NC, JENSEN on CPAP, CAD, HTN, CKD stage 3, HLD and AAA Originally presented with  shortness of breath and lower back pain. Per chart notes respiratory status has been worsening for the last 2-3 days without associated fevers or chills but reported worsening of chronic cough 2-3 days. The patient was admitted to the Medical team service for further management.  Blood and urine cx and RVP  have been negative.  CT chest noting small rounded GGO. Initial COVID -19 and was found to be negative. However, test has been rechecked due to worsening respiratory status. Currently the patient is maintained on 100%. Plaquenil and Ceftriaxone have also been initiated. MICU COVID unit consulted 3/22 for worsening hypoxic respiratory failure.  Over past 2 days slightly better but no ambulation to properly assess; some mucus production.    Consult: This is a 69 y/o M with hx of 3L NC, JENSEN on CPAP, ?Hx of ILD, CAD, HTN, CKD 3, HLD and AAA who presents on 3/20 with shortness of breath and low back pain. Patient has had worsening respiratory status throughout the admission with worsening of his chronic cough. Has had Flu and RVP panel negative on admission with two negative COVID 19 tests (3/20 and 3/21). CT chest on 3/20 with patchy b/l peripheral GGO. MICU consulted on 3/22 for hypoxia: recommended Azithromycin and solumdrol. Antibiotics have since been broadened to vanc/zosyn with worsening shortness of breath and patchy opacities on CXR. However has been afebrile since 3/21.     Patient on floors and had O2 sat of 86 with good waveform on Bipap. AB.41/49/54/30/87. Placed on NRB and still satting in the 80s -- intubated for hypoxemia.   Bedside POCUS: patchy B lines more prominent on left side. No gross consolidation on U/S. Cardiac exam: no evidence of septal flattening and RV not bigger than LV.     Recommendations:   - check CBC w/diff, CMP/Mg/Phos, T-cell subset, Coags, D-dimer, Procalcitonin, ESR, CRP, LDH, Ferritin, lactate, ABG and G6PD. Check CXR once patient gets onto the unit   - would start with CTA once stable from oxygenation standpoint to assess for PE, patient already had two negative COVIDs   - Recommend RR 18 FIO2 100% PEEP 16 and TV by Ideal Body Weight 6-8cc/kg (480 for 6cc) and repeat ABG   - Agree with vanc/zosyn would restart Azithromycin, check EKG

## 2020-03-25 NOTE — PROGRESS NOTE ADULT - SUBJECTIVE AND OBJECTIVE BOX
Patient is a 70 year-old gentleman with known advanced COPD on home oxygen.  He is admitted with viral pneumonia secondary to Covid-19.  Overnight, he had worsening respiratory distress and was intubated.    Palliative Care consult appreciated for goals of care discussion.  Per his wife's discussion with CCU staff and Palliative Care team, patient would prefer to be DNR.     For Covid-19 pneumonia requiring ventilatory assist, will continue ventilatory support for now.  Azithromycin and Plaquenil as potential Covid-19 modifying therapies (data pending).  Sedation to maintain ventilatory concordance.  Pressors to maintain perfusion pressure of MAP >65 mmHg.

## 2020-03-25 NOTE — DIETITIAN INITIAL EVALUATION ADULT. - FACTORS AFF FOOD INTAKE
as per chart, prior to intubation, no indication of chewing/swallowing issues, noted per flow sheets, closer to admission pt was able to consume 100% of at least one meal if not more

## 2020-03-25 NOTE — PROCEDURE NOTE - NSPROCDETAILS_GEN_ALL_CORE
sutured in place/connected to a pressurized flush line/Seldinger technique/location identified, draped/prepped, sterile technique used, needle inserted/introduced/positive blood return obtained via catheter/all materials/supplies accounted for at end of procedure/ultrasound guidance
guidewire recovered/sterile technique, catheter placed/ultrasound guidance/sterile dressing applied/lumen(s) aspirated and flushed

## 2020-03-25 NOTE — PROGRESS NOTE ADULT - ASSESSMENT
70M with COPD on home 3L NC, JENSEN on CPAP, CAD, HTN, CKD stage 3, HLD and AAA admitted with worsening chronic cough and SOB, found to have acute on chronic hypoxic respiratory failure  initial and f/up COVID- 19 (-)--currently SOB--acute upon chronic resp failure--copd flair and bibasilar PNA Left over right.   ******************  3/25-resp failure--intubated and sedated

## 2020-03-25 NOTE — PROVIDER CONTACT NOTE (CHANGE IN STATUS NOTIFICATION) - SITUATION
pt. was desatting at 85% - 88% on BIPAP, respiratory distress pt. was desatting at 85% - 88% on BIPAP, respiratory distress, respiratory therapist at bedside

## 2020-03-25 NOTE — CONSULT NOTE ADULT - PROBLEM SELECTOR RECOMMENDATION 5
spent 30 minutes on phone with patients wife cheri/surrogate who agrees that a DNR/DNI would be consistent with patients known wishes. She understands her husbands critical state and potential for poor outcome and high level of mortality given patients chronic illnesses.   Cheri verbalizes understanding and understands she may be faced with a conversation surrounding compassionate extubation.  Although appropriately tearful she understands the overall poor prognosis.   MOLST completed,  DNR/DNI , document placed in chart, order placed in [facility]. Palliative will continue to follow.

## 2020-03-25 NOTE — DIETITIAN INITIAL EVALUATION ADULT. - ENERGY NEEDS
Estimated calorie needs: 3757-5984 brittney (11-14cal/kg based on dosing wt of 124.4kg)  2010 [facility] Equation: 2239cal (~18cal/kg based on dosing wt of 124.4kg)

## 2020-03-25 NOTE — DIETITIAN INITIAL EVALUATION ADULT. - PERTINENT MEDS FT
Pepcid, Levophed, Lipitor, Propofol (currently at 30ml/hr, if on for 24 hrs, pt will be receiving >500cals from Propofol alone)

## 2020-03-25 NOTE — PROGRESS NOTE ADULT - PROBLEM SELECTOR PLAN 1
multifactorial dyspnea/resp failure-copd, PNA, CAD, debility, ?PE--O 2 sat above 90%--now intubated and sedated--wean fio2 as able

## 2020-03-25 NOTE — CHART NOTE - NSCHARTNOTEFT_GEN_A_CORE
Notified by RN to evaluate pt for desaturating. Pt seen and evaluated at bedside. Pt was on 100% bipap but desat to as low as 85%. Respiratory called to adjust the setting of bipap. Setting adjusted several times but pt remains hypoxic w/ SpO2 around 88-90%. ABG checked and it showed pO2 57. RRT called for acute hypoxic respiratory distress.     Vital Signs Last 24 Hrs  T(C): 36.8 (24 Mar 2020 21:16), Max: 37.6 (24 Mar 2020 09:25)  T(F): 98.3 (24 Mar 2020 21:16), Max: 99.6 (24 Mar 2020 09:25)  HR: 70 (25 Mar 2020 00:40) (65 - 79)  BP: 164/80 (24 Mar 2020 21:16) (139/68 - 164/80)  RR: 20 (24 Mar 2020 21:55) (18 - 23)  SpO2: 89% (25 Mar 2020 00:40) (89% - 95%)     # acute hypoxic respiratory distress  - ABG stat shows pO2 57  - Duoneb x 2   - RRT called evaluation and management and Anesthesia called for intubation   - Pt transferred to MICU  - Will discuss w/ night hospitalist covering    Maddison Juarez PA-C  [id number] Notified by RN to evaluate pt for low pulse oximetry. Pt seen and evaluated at bedside. Pt was on bipap w/ 100% FiO2 but desat as low as 85%. Respiratory called to adjust the setting of bipap. Setting adjusted several times but pt remains hypoxic w/ SpO2 around 88-90%. ABG checked and it showed pO2 57. RRT called for acute hypoxic respiratory distress.     Vital Signs Last 24 Hrs  T(C): 36.8 (24 Mar 2020 21:16), Max: 37.6 (24 Mar 2020 09:25)  T(F): 98.3 (24 Mar 2020 21:16), Max: 99.6 (24 Mar 2020 09:25)  HR: 70 (25 Mar 2020 00:40) (65 - 79)  BP: 164/80 (24 Mar 2020 21:16) (139/68 - 164/80)  RR: 20 (24 Mar 2020 21:55) (18 - 23)  SpO2: 89% (25 Mar 2020 00:40) (89% - 95%)     # acute hypoxic respiratory distress  - ABG stat shows pO2 57  - Duoneb x 2   - RRT called for evaluation and management and Anesthesia called for intubation   - Pt transferred to MICU  - Will discuss w/ night hospitalist covering    Maddison Juarez PA-C  [id number] Notified by RN to evaluate pt for low pulse oximetry. Pt seen and evaluated at bedside. Pt was on bipap w/ 100% FiO2 but desat as low as 85%. Respiratory called to adjust the setting of bipap. Setting adjusted several times but pt remains hypoxic w/ SpO2 around 88-90%. ABG checked and it showed pO2 57. RRT called for acute hypoxic respiratory distress.     Vital Signs Last 24 Hrs  T(C): 36.8 (24 Mar 2020 21:16), Max: 37.6 (24 Mar 2020 09:25)  T(F): 98.3 (24 Mar 2020 21:16), Max: 99.6 (24 Mar 2020 09:25)  HR: 70 (25 Mar 2020 00:40) (65 - 79)  BP: 164/80 (24 Mar 2020 21:16) (139/68 - 164/80)  RR: 20 (24 Mar 2020 21:55) (18 - 23)  SpO2: 89% (25 Mar 2020 00:40) (89% - 95%)       ABG - ( 25 Mar 2020 00:47 )  pH, Arterial: 7.41  pH, Blood: x     /  pCO2: 49    /  pO2: 54    / HCO3: 30    / Base Excess: 5.2   /  SaO2: 87        # acute hypoxic respiratory distress  - ABG stat shows pO2 57  - Duoneb x 2   - RRT called for evaluation and management and Anesthesia called for intubation   - Pt transferred to MICU  - Will discuss w/ night hospitalist covering    Maddison Juarez PA-C  [id number] Notified by RN to evaluate pt for low pulse oximetry. Pt seen and evaluated at bedside. Pt was on bipap w/ 100% FiO2 but desat as low as 85%. Respiratory called to adjust the setting of bipap. Setting adjusted several times but pt remains hypoxic w/ SpO2 around 88-90%. ABG checked and it showed pO2 57. RRT called for acute hypoxic respiratory distress.     Vital Signs Last 24 Hrs  T(C): 36.8 (24 Mar 2020 21:16), Max: 37.6 (24 Mar 2020 09:25)  T(F): 98.3 (24 Mar 2020 21:16), Max: 99.6 (24 Mar 2020 09:25)  HR: 70 (25 Mar 2020 00:40) (65 - 79)  BP: 164/80 (24 Mar 2020 21:16) (139/68 - 164/80)  RR: 20 (24 Mar 2020 21:55) (18 - 23)  SpO2: 89% (25 Mar 2020 00:40) (89% - 95%)       ABG - ( 25 Mar 2020 00:47 )  pH, Arterial: 7.41  pH, Blood: x     /  pCO2: 49    /  pO2: 54    / HCO3: 30    / Base Excess: 5.2   /  SaO2: 87        # acute hypoxic respiratory distress  - ABG stat shows pO2 57  - Duoneb x 2   - RRT called for evaluation and management and Anesthesia called for intubation   - Pt will transfer to CCU  - Will discuss w/ night hospitalist covering    Maddison Juarez PA-C  [id number]

## 2020-03-26 NOTE — PROGRESS NOTE ADULT - SUBJECTIVE AND OBJECTIVE BOX
CHIEF COMPLAINT: f/up resp failure, copd, PNA-? covid19 (neg x 2 thus far), osas--now intubated    Interval Events:    REVIEW OF SYSTEMS:  Constitutional: No fevers or chills. No weight loss. No fatigue or generalized malaise.  Eyes: No itching or discharge from the eyes  ENT: No ear pain. No ear discharge. No nasal congestion. No post nasal drip. No epistaxis. No throat pain. No sore throat. No difficulty swallowing.   CV: No chest pain. No palpitations. No lightheadedness or dizziness.   Resp: No dyspnea at rest. No dyspnea on exertion. No orthopnea. No wheezing. No cough. No stridor. No sputum production. No chest pain with respiration.  GI: No nausea. No vomiting. No diarrhea.  MSK: No joint pain or pain in any extremities  Integumentary: No skin lesions. No pedal edema.  Neurological: No gross motor weakness. No sensory changes.  [ ] All other systems negative  [ ] Unable to assess ROS because ________    OBJECTIVE:  ICU Vital Signs Last 24 Hrs  T(C): 37.1 (25 Mar 2020 21:45), Max: 37.3 (25 Mar 2020 09:00)  T(F): 98.8 (25 Mar 2020 21:45), Max: 99.1 (25 Mar 2020 09:00)  HR: 46 (26 Mar 2020 02:27) (42 - 68)  BP: 116/63 (25 Mar 2020 14:00) (85/46 - 119/57)  BP(mean): 83 (25 Mar 2020 14:00) (60 - 83)  ABP: 86/48 (26 Mar 2020 02:15) (86/48 - 200/82)  ABP(mean): 60 (26 Mar 2020 02:15) (60 - 118)  RR: 24 (26 Mar 2020 02:15) (11 - 24)  SpO2: 90% (26 Mar 2020 02:27) (69% - 98%)    Mode: AC/ CMV (Assist Control/ Continuous Mandatory Ventilation), RR (machine): 25, FiO2: 100, PEEP: 25, PS: 0, ITime: 1, MAP: 32, PC: 15, PIP: 40    03-24 @ 07:01  -  03-25 @ 07:00  --------------------------------------------------------  IN: 413 mL / OUT: 970 mL / NET: -557 mL    03-25 @ 07:01  - 03-26 @ 05:01  --------------------------------------------------------  IN: 1293.8 mL / OUT: 425 mL / NET: 868.8 mL      CAPILLARY BLOOD GLUCOSE      POCT Blood Glucose.: 119 mg/dL (25 Mar 2020 00:32)      PHYSICAL EXAM:  General: Awake, alert, oriented X 3.   HEENT: Atraumatic, normocephalic.                 Mallampatti Grade                 No nasal congestion.                No tonsillar or pharyngeal exudates.  Lymph Nodes: No palpable lymphadenopathy  Neck: No JVD. No carotid bruit.   Respiratory: Normal chest expansion                         Normal percussion                         Normal and equal air entry                         No wheeze, rhonchi or rales.  Cardiovascular: S1 S2 normal. No murmurs, rubs or gallops.   Abdomen: Soft, non-tender, non-distended. No organomegaly. Normoactive bowel sounds.  Extremities: Warm to touch. Peripheral pulse palpable. No pedal edema.   Skin: No rashes or skin lesions  Neurological: Motor and sensory examination equal and normal in all four extremities.  Psychiatry: Appropriate mood and affect.    HOSPITAL MEDICATIONS:  MEDICATIONS  (STANDING):  aspirin  chewable 81 milliGRAM(s) Oral daily  atorvastatin 80 milliGRAM(s) Oral at bedtime  azithromycin  IVPB      azithromycin  IVPB 500 milliGRAM(s) IV Intermittent every 24 hours  buPROPion XL . 150 milliGRAM(s) Oral daily  chlorhexidine 0.12% Liquid 15 milliLiter(s) Oral Mucosa every 12 hours  famotidine  IVPB 20 milliGRAM(s) IV Intermittent two times a day  fentaNYL   Infusion. 0.5 MICROgram(s)/kG/Hr (3.11 mL/Hr) IV Continuous <Continuous>  heparin  Injectable 7500 Unit(s) SubCutaneous every 8 hours  hydroxychloroquine 200 milliGRAM(s) Oral two times a day  ipratropium 17 MICROgram(s) HFA Inhaler 1 Puff(s) Inhalation every 6 hours  norepinephrine Infusion 0.1 MICROgram(s)/kG/Min (23.3 mL/Hr) IV Continuous <Continuous>  propofol Infusion 75 MICROgram(s)/kG/Min (56 mL/Hr) IV Continuous <Continuous>  roflumilast 500 MICROGram(s) Oral daily  sodium zirconium cyclosilicate 10 Gram(s) Oral once  tamsulosin 0.4 milliGRAM(s) Oral at bedtime  vasopressin Infusion 0.1 Unit(s)/Min (6 mL/Hr) IV Continuous <Continuous>    MEDICATIONS  (PRN):  acetaminophen   Tablet .. 650 milliGRAM(s) Oral every 6 hours PRN Temp greater or equal to 38C (100.4F)  ALBUTerol    90 MICROgram(s) HFA Inhaler 2 Puff(s) Inhalation every 4 hours PRN Shortness of Breath and/or Wheezing      LABS:                        13.4   8.12  )-----------( 168      ( 26 Mar 2020 03:37 )             40.7     03-26    130<L>  |  90<L>  |  61<H>  ----------------------------<  170<H>  5.7<H>   |  20<L>  |  2.70<H>    Ca    8.5      26 Mar 2020 03:37  Phos  7.5     03-26  Mg     2.7     03-26    TPro  6.6  /  Alb  3.1<L>  /  TBili  1.0  /  DBili  x   /  AST  65<H>  /  ALT  51<H>  /  AlkPhos  54  03-26    PT/INR - ( 25 Mar 2020 03:57 )   PT: 12.0 sec;   INR: 1.04 ratio         PTT - ( 25 Mar 2020 01:22 )  PTT:55.2 sec    Arterial Blood Gas:  03-26 @ 03:34  7.28/53/95/24/96/-2.9  ABG lactate: --  Arterial Blood Gas:  03-25 @ 06:01  7.28/58/97/26/96/-1.3  ABG lactate: --  Arterial Blood Gas:  03-25 @ 03:50  7.22/74/68/29/90/-.2  ABG lactate: --  Arterial Blood Gas:  03-25 @ 00:47  7.41/49/54/30/87/5.2  ABG lactate: --  Arterial Blood Gas:  03-25 @ 00:12  7.42/41/57/26/89/2.3  ABG lactate: --        MICROBIOLOGY:     RADIOLOGY:  [ ] Reviewed and interpreted by me    Point of Care Ultrasound Findings:    PFT:    EKG: CHIEF COMPLAINT: f/up resp failure, copd, PNA-? covid19 (now +), osas--now intubated-now DNR    Interval Events: DNR on pressors    REVIEW OF SYSTEMS:  Constitutional: No fevers or chills. No weight loss. No fatigue or generalized malaise.  Eyes: No itching or discharge from the eyes  ENT: No ear pain. No ear discharge. No nasal congestion. No post nasal drip. No epistaxis. No throat pain. No sore throat. No difficulty swallowing.   CV: No chest pain. No palpitations. No lightheadedness or dizziness.   Resp: No dyspnea at rest. No dyspnea on exertion. No orthopnea. No wheezing. No cough. No stridor. No sputum production. No chest pain with respiration.  GI: No nausea. No vomiting. No diarrhea.  MSK: No joint pain or pain in any extremities  Integumentary: No skin lesions. No pedal edema.  Neurological: No gross motor weakness. No sensory changes.  [ ] All other systems negative  [+ ] Unable to assess ROS because _sedated and on pressors_______    OBJECTIVE:  ICU Vital Signs Last 24 Hrs  T(C): 37.1 (25 Mar 2020 21:45), Max: 37.3 (25 Mar 2020 09:00)  T(F): 98.8 (25 Mar 2020 21:45), Max: 99.1 (25 Mar 2020 09:00)  HR: 46 (26 Mar 2020 02:27) (42 - 68)  BP: 116/63 (25 Mar 2020 14:00) (85/46 - 119/57)  BP(mean): 83 (25 Mar 2020 14:00) (60 - 83)  ABP: 86/48 (26 Mar 2020 02:15) (86/48 - 200/82)  ABP(mean): 60 (26 Mar 2020 02:15) (60 - 118)  RR: 24 (26 Mar 2020 02:15) (11 - 24)  SpO2: 90% (26 Mar 2020 02:27) (69% - 98%)    Mode: AC/ CMV (Assist Control/ Continuous Mandatory Ventilation), RR (machine): 25, FiO2: 100, PEEP: 25, PS: 0, ITime: 1, MAP: 32, PC: 15, PIP: 40    03-24 @ 07:01 - 03-25 @ 07:00  --------------------------------------------------------  IN: 413 mL / OUT: 970 mL / NET: -557 mL    03-25 @ 07:01 - 03-26 @ 05:01  --------------------------------------------------------  IN: 1293.8 mL / OUT: 425 mL / NET: 868.8 mL      CAPILLARY BLOOD GLUCOSE      POCT Blood Glucose.: 119 mg/dL (25 Mar 2020 00:32)      PHYSICAL EXAM:  General: sedated on vent  HEENT: Atraumatic, normocephalic.                 Mallampatti Grade 3                No nasal congestion.                No tonsillar or pharyngeal exudates.  Lymph Nodes: No palpable lymphadenopathy  Neck: No JVD. No carotid bruit.   Respiratory: Normal chest expansion                         Normal percussion                         Normal and equal air entry                         No wheeze, rhonchi but + rales.  Cardiovascular: S1 S2 normal. No murmurs, rubs or gallops.   Abdomen: Soft, non-tender, non-distended. No organomegaly. Normoactive bowel sounds.  Extremities: Warm to touch. Peripheral pulse palpable. No pedal edema.   Skin: No rashes or skin lesions  Neurological: Motor and sensory examination unable  Psychiatry: unable    HOSPITAL MEDICATIONS:  MEDICATIONS  (STANDING):  aspirin  chewable 81 milliGRAM(s) Oral daily  atorvastatin 80 milliGRAM(s) Oral at bedtime  azithromycin  IVPB      azithromycin  IVPB 500 milliGRAM(s) IV Intermittent every 24 hours  buPROPion XL . 150 milliGRAM(s) Oral daily  chlorhexidine 0.12% Liquid 15 milliLiter(s) Oral Mucosa every 12 hours  famotidine  IVPB 20 milliGRAM(s) IV Intermittent two times a day  fentaNYL   Infusion. 0.5 MICROgram(s)/kG/Hr (3.11 mL/Hr) IV Continuous <Continuous>  heparin  Injectable 7500 Unit(s) SubCutaneous every 8 hours  hydroxychloroquine 200 milliGRAM(s) Oral two times a day  ipratropium 17 MICROgram(s) HFA Inhaler 1 Puff(s) Inhalation every 6 hours  norepinephrine Infusion 0.1 MICROgram(s)/kG/Min (23.3 mL/Hr) IV Continuous <Continuous>  propofol Infusion 75 MICROgram(s)/kG/Min (56 mL/Hr) IV Continuous <Continuous>  roflumilast 500 MICROGram(s) Oral daily  sodium zirconium cyclosilicate 10 Gram(s) Oral once  tamsulosin 0.4 milliGRAM(s) Oral at bedtime  vasopressin Infusion 0.1 Unit(s)/Min (6 mL/Hr) IV Continuous <Continuous>    MEDICATIONS  (PRN):  acetaminophen   Tablet .. 650 milliGRAM(s) Oral every 6 hours PRN Temp greater or equal to 38C (100.4F)  ALBUTerol    90 MICROgram(s) HFA Inhaler 2 Puff(s) Inhalation every 4 hours PRN Shortness of Breath and/or Wheezing      LABS:                        13.4   8.12  )-----------( 168      ( 26 Mar 2020 03:37 )             40.7     03-26    130<L>  |  90<L>  |  61<H>  ----------------------------<  170<H>  5.7<H>   |  20<L>  |  2.70<H>    Ca    8.5      26 Mar 2020 03:37  Phos  7.5     03-26  Mg     2.7     03-26    TPro  6.6  /  Alb  3.1<L>  /  TBili  1.0  /  DBili  x   /  AST  65<H>  /  ALT  51<H>  /  AlkPhos  54  03-26    PT/INR - ( 25 Mar 2020 03:57 )   PT: 12.0 sec;   INR: 1.04 ratio         PTT - ( 25 Mar 2020 01:22 )  PTT:55.2 sec    Arterial Blood Gas:  03-26 @ 03:34  7.28/53/95/24/96/-2.9  ABG lactate: --  Arterial Blood Gas:  03-25 @ 06:01  7.28/58/97/26/96/-1.3  ABG lactate: --  Arterial Blood Gas:  03-25 @ 03:50  7.22/74/68/29/90/-.2  ABG lactate: --  Arterial Blood Gas:  03-25 @ 00:47  7.41/49/54/30/87/5.2  ABG lactate: --  Arterial Blood Gas:  03-25 @ 00:12  7.42/41/57/26/89/2.3  ABG lactate: --        MICROBIOLOGY:     RADIOLOGY:  [ ] Reviewed and interpreted by me    Point of Care Ultrasound Findings:    PFT:    EKG:

## 2020-03-26 NOTE — PROGRESS NOTE ADULT - PROBLEM SELECTOR PLAN 1
management as per CCU team  Prognosis poor given underlying respiratory issues including COPD and JENSEN ( on home O2)

## 2020-03-26 NOTE — PROGRESS NOTE ADULT - PROBLEM/PLAN-6
Date & Time: 5/21/2021, 2:35 PM  Patient: Maru Joel  Encounter Provider(s):    Ebony Lopez MD       To Whom It May Concern:    Leanne Zuñiga was seen and treated in our department on 5/21/2021.  She should not participate in gym/sports unt
Date & Time: 5/21/2021, 2:35 PM  Patient: Tiffanie Bain  Encounter Provider(s):    Nabil Coe MD       To Whom It May Concern:    Mahogany Foster was seen and treated in our department on 5/21/2021.  She should not participate in gym/sports unt
DISPLAY PLAN FREE TEXT

## 2020-03-26 NOTE — PROGRESS NOTE ADULT - SUBJECTIVE AND OBJECTIVE BOX
Due to COVID 19 and in order to decrease providers exposure and the usage or PPE  the H&P, ROS and PE was taken form the primary care teams note.      SUBJECTIVE AND OBJECTIVE: as per CCU team.  Pt now developing MSOF rising creatinine  INTERVAL HPI/OVERNIGHT EVENTS: rising creatinine    DNR on chart: Yes  Yes    Allergies    No Known Allergies    Intolerances    MEDICATIONS  (STANDING):  aspirin  chewable 81 milliGRAM(s) Oral daily  chlorhexidine 0.12% Liquid 15 milliLiter(s) Oral Mucosa every 12 hours  famotidine Injectable 20 milliGRAM(s) IV Push daily  fentaNYL   Infusion. 0.5 MICROgram(s)/kG/Hr (3.11 mL/Hr) IV Continuous <Continuous>  heparin  Injectable 7500 Unit(s) SubCutaneous every 8 hours  norepinephrine Infusion 0.1 MICROgram(s)/kG/Min (23.3 mL/Hr) IV Continuous <Continuous>  propofol Infusion 75 MICROgram(s)/kG/Min (56 mL/Hr) IV Continuous <Continuous>  vasopressin Infusion 0.1 Unit(s)/Min (6 mL/Hr) IV Continuous <Continuous>    MEDICATIONS  (PRN):  acetaminophen   Tablet .. 650 milliGRAM(s) Oral every 6 hours PRN Temp greater or equal to 38C (100.4F)      ITEMS UNCHECKED ARE NOT PRESENT    Due to COVID 19 and in order to decrease providers exposure and the usage or PPE  the H&P, ROS and/or PE was taken form the primary care teams note.    PRESENT SYMPTOMS: [x ]Unable to obtain due to poor mentation   Source if other than patient:  [ ]Family   [ ]Team     Pain:  [ ]yes [ x]no  QOL impact -   Location -                    Aggravating factors -  Quality -  Radiation -  Timing-  Severity (0-10 scale):  Minimal acceptable level (0-10 scale):     Dyspnea:                           [ ]Mild [ ]Moderate [ ]Severe  Anxiety:                             [ ]Mild [ ]Moderate [ ]Severe  Fatigue:                             [ ]Mild [ ]Moderate [ ]Severe  Nausea:                            [ ]Mild [ ]Moderate [ ]Severe  Loss of appetite:               [ ]Mild [ ]Moderate [ ]Severe  Constipation:                    [ ]Mild [ ]Moderate [ ]Severe    PAIN AD Score:	  http://geriatrictoolkit.[facility]/cog/painad.pdf (Ctrl + left click to view)    Other Symptoms:  [ ]All other review of systems negative     Palliative Performance Status Version 2:     10    %      http://npcrc.org/files/news/palliative_performance_scale_ppsv2.pdf    PHYSICAL EXAM:  Vital Signs Last 24 Hrs  T(C): 36.7 (26 Mar 2020 08:00), Max: 37.2 (25 Mar 2020 15:00)  T(F): 98 (26 Mar 2020 08:00), Max: 98.9 (25 Mar 2020 15:00)  HR: 56 (26 Mar 2020 10:15) (42 - 68)  BP: 116/63 (25 Mar 2020 14:00) (102/63 - 116/63)  BP(mean): 83 (25 Mar 2020 14:00) (76 - 83)  RR: 25 (26 Mar 2020 10:15) (5 - 25)  SpO2: 96% (26 Mar 2020 10:15) (69% - 97%) I&O's Summary    25 Mar 2020 07:01  -  26 Mar 2020 07:00  --------------------------------------------------------  IN: 1921.6 mL / OUT: 505 mL / NET: 1416.6 mL    26 Mar 2020 07:01  -  26 Mar 2020 10:31  --------------------------------------------------------  IN: 170.7 mL / OUT: 70 mL / NET: 100.7 mL    Due to COVID 19 and in order to decrease providers exposure and the usage or PPE  the H&P, ROS and/or PE was taken form the primary care teams note.       GENERAL:  [ ]Alert  [ ]Oriented x   [x ]Lethargic  [ ]Cachexia  [ ]Unarousable  [ ]Verbal  [x ]Non-Verbal    Behavioral:   [ ]Anxiety  [ ]Delirium [ ]Agitation [ ]Other    HEENT:  [ ]Normal   [x ]Dry mouth   [x ]ET Tube/Trach  [ ]Oral lesions    PULMONARY: course  [ ]Clear [ ]Tachypnea  [ ]Audible excessive secretions   [x ]Rhonchi        [ ]Right [ ]Left [ ]Bilateral  [ ]Crackles        [ ]Right [ ]Left [ ]Bilateral  [ ]Wheezing     [ ]Right [ ]Left [ ]Bilateral  [ ]Diminished BS [ ] Right [ ]Left [ ]Bilateral    CARDIOVASCULAR:    [ ]Regular [ ]Irregular [ ]Tachy  [x] Jay [ ]Murmur [ ]Other    GASTROINTESTINAL:  [x ]Soft  [ ]Distended   [x ]+BS  [ ]Non tender [ ]Tender  [ ]PEG [ ]OGT/ NGT   Last BM:      GENITOURINARY:  [ ]Normal [ ]Incontinent   [ ]Oliguria/Anuria   [x ][place]   [ ] External cath    MUSCULOSKELETAL:   [ ]Normal   [ x]Weakness  [ x]Bed/Wheelchair bound [ ]Edema    NEUROLOGIC: sedated  [ ]No focal deficits  [ ] Cognitive impairment  [ ] Dysphagia [ ]Dysarthria [ ] Paresis [ ]Other     SKIN:   [x ]Normal  [ ]Rash   [ ]Pressure ulcer(s) [ ]y [ ]n present on admission    CRITICAL CARE:  [ ]Shock Present  [ ]Septic [ ]Cardiogenic [ ]Neurologic [ ]Hypovolemic  [ ]Vasopressors [ ]Inotropes  [x ]Respiratory failure present [ x] ]Mechanical Ventilation [ ]Non-invasive ventilatory support [ ]High-Flow  [x ]Acute  [ ]Chronic [ ]Hypoxic  [ ]Hypercarbic [ ]Other  [ ]Other organ failure     LABS:                        13.4   8.12  )-----------( 168      ( 26 Mar 2020 03:37 )             40.7   03-26    130<L>  |  90<L>  |  61<H>  ----------------------------<  170<H>  5.7<H>   |  20<L>  |  2.70<H>    Ca    8.5      26 Mar 2020 03:37  Phos  7.5     03-26  Mg     2.7     03-26    TPro  6.6  /  Alb  3.1<L>  /  TBili  1.0  /  DBili  x   /  AST  65<H>  /  ALT  51<H>  /  AlkPhos  54  03-26  PT/INR - ( 25 Mar 2020 03:57 )   PT: 12.0 sec;   INR: 1.04 ratio         PTT - ( 25 Mar 2020 01:22 )  PTT:55.2 sec      RADIOLOGY & ADDITIONAL STUDIES:    Protein Calorie Malnutrition Present: [ ]mild [ ]moderate [ ]severe [ ]underweight [ ]morbid obesity  https://www.andeal.org/vault/2440/web/files/ONC/Table_Clinical%20Characteristics%20to%20Document%20Malnutrition-White%20JV%20et%20al%202012.pdf    Height (cm): 177.8 (03-21-20 @ 05:45), 177.8 (04-08-19 @ 19:17)  Weight (kg): 124.4 (03-21-20 @ 05:45), 123.8 (04-08-19 @ 19:17)  BMI (kg/m2): 39.4 (03-21-20 @ 05:45), 39.2 (04-08-19 @ 19:17)    [ x]PPSV2 < or = 30%  [ ]significant weight loss [ ]poor nutritional intake [ ]anasarca   Albumin, Serum: 3.1 g/dL (03-26-20 @ 03:37)   [ ]Artificial Nutrition    REFERRALS:   [ ]Chaplaincy  [ ]Hospice  [ ]Child Life  [ ]Social Work  [ ]Case management [ ]Holistic Therapy     Goals of Care Document:

## 2020-03-26 NOTE — PROGRESS NOTE ADULT - PROBLEM SELECTOR PLAN 5
Spoke with wife Cheri via the phone for greater the 30 mins.  Discussed her husbands hospital course and reviewed what the docs have been reporting to her.  I reviewed the information that CCU team reported this morning to me  (based on hospital COVID + guidelines) including but not limited to rising creatinine and increased O2 requirements. Cheri understands that her husbands condition continues to worsen and a compassionate extubation would be the next step.  Plan for compassionate extubation to be determined by CCU team. Informed Cheri of visitation guidelines.  She became angry and yelling when reviewed.  I listened to her concerns and apologized for what is happening..  I explained that I would have to speak with the CCU team and they would have to make ongoing determinations as to the next steps.  Spoke with Scott MCDONALD form CCU team.  He is aware of our conversation and he will speak with Dr. Hicks and call Pts wife Cheri back to discuss a plan.  In addition spoke with Dao from [facility] to make them aware of conversation.  They supplied visitation guidelines.

## 2020-03-26 NOTE — PROGRESS NOTE ADULT - SUBJECTIVE AND OBJECTIVE BOX
WYATT GATES   MRN#: [id number]     The patient is a 70y Male who was seen, evaluated, & examined with the ICU staff with a multidisciplinary care plan formulated & implemented.  All available clinical, laboratory, radiographic, pharmacologic, and electrocardiographic data were reviewed & analyzed.      The patient was in the ICU in critical condition secondary to:     persistent cardiopulmonary dysfunction  vasodilatory shock    For support and evaluation & prevention of further decompensation secondary to persistent cardiopulmonary dysfunction, respiratory status required:     supplemental oxygen with full ventilatory support / mechanical ventilation  continuous pulse oximetry monitoring  following ABGs with A-line monitoring  IV Propofol infusion  IV Fentanyl infusion     Invasive hemodynamic monitoring with     an A-line was required for continuous MAP/BP monitoring     to ensure adequate cardiovascular support and to evaluate for & help prevent decompensation while receiving     IV Levophed infusion  IV Vasopressin infusion    secondary to     vasodilatory shock    In addition:  Respiratory failure in the setting of COVID+  DNR; rescinded DNI, now intubated   Vasoplegic with pressor requirement - pressors are capped  Vent mode switched from APRV to pressure control - O2 sat stable  Family has said despite rescinding DNI he would not want to be intubated   Will not escalate care per wishes of family  May terminally extubate today - ongoing family discussion    The patient required critical care management and I personally provided 30 minutes of non-continuous care to the patient, excluding separate procedures, in addition to  discussing the patient and plan at length with the CTICU staff and helping coordinate care.

## 2020-03-26 NOTE — PROGRESS NOTE ADULT - ASSESSMENT
70M with COPD on home 3L NC, JENSEN on CPAP, CAD, HTN, CKD stage 3, HLD and AAA admitted with worsening chronic cough and SOB, found to have acute on chronic hypoxic respiratory failure  initial and f/up COVID- 19 (-)--currently SOB--acute upon chronic resp failure--copd flair and bibasilar PNA Left over right.   ******************  3/25-resp failure--intubated and sedated 70M with COPD on home 3L NC, JENSEN on CPAP, CAD, HTN, CKD stage 3, HLD and AAA admitted with worsening chronic cough and SOB, found to have acute on chronic hypoxic respiratory failure  initial and f/up COVID- 19 (-)--currently SOB--acute upon chronic resp failure--copd flair and bibasilar PNA Left over right.   ******************  3/25-resp failure--intubated and sedated  3/26-covid re-test #3 positive, on pressors and now DNR

## 2020-03-26 NOTE — DISCHARGE NOTE FOR THE EXPIRED PATIENT - HOSPITAL COURSE
69 yo male with COPD on home O2, JENSEN, CAD, HTN, CKD 3, HLD, AAA admitted and treated for COVID 19 mediated ARDS and acute hypoxic respiratory failure.  Intubated on 3/24/2020.  Patient developed acute on chronic renal failure and hypoxic respiratory failure despite maximal ventilatory support.  Family discussion with Palliative care and medical staff was initiated and withdrawal of care with escalation of comfort measures was initiated.  Patient was extubated at 1253pm and was on an IV fentanyl and IV propofol drip for comfort measures.  Patient  at 1256pm.

## 2020-03-26 NOTE — PROGRESS NOTE ADULT - PROBLEM SELECTOR PLAN 3
zosyn to continue, vanco and now zithromax again   plaquenil initiated despite covid19 negative x 2 zosyn to continue, vanco and now zithromax again   plaquenil initiated covid19 negative x 2 but #3 positive

## 2020-03-26 NOTE — PROGRESS NOTE ADULT - ATTENDING COMMENTS
as above-  Multifactorial dyspnea-copd exacerbation, PNA, obesity, ?CHF, ? DVT/PE--keep sat 88%--now resp failure requiring intubation  Hypoxemia---resp failure on vent now--bilevel resp support--titrate fio2 down as able-sat above 88%  copd-boost steroids to solumedrol 20 q6, duoneb q 6, incentive             ?CAD--check BNP--if elevated echo, cards evaln     OSAS--bipap o/n and prn  PNA-zosyn/vanco D2/3--added zithromax and plaquenil for ?covid19 (despite neg x 2)            Nicotine addiction--nicotrol patch etc.  ? PE--+ d-dimer--if positive -? doppler vs empiric lovenox rx                    DVT/GI prophylaxis      [facility] conference  Fabian Santoyo MD-Pulmonary   [phone] as above-now covid +-----DNR  Multifactorial dyspnea-copd exacerbation, covid induced PNA, obesity-keep sat 88%--now resp failure requiring intubation  Hypoxemia---resp failure on vent now--bilevel resp support--titrate fio2 down as able-sat above 88%  copd- solumedrol 20 q6, duoneb q 6             ?CAD--mult meds   OSAS--bipap o/n and prn  PNA-zosyn/vanco D3/4--added zithromax and plaquenil for +covid19             Nicotine addiction--nicotrol patch etc.                   DVT/GI prophylaxis      [facility] conference-now DNR--wife contemplating terminal wean  Fabian Santoyo MD-Pulmonary   [phone]

## 2020-03-26 NOTE — PROGRESS NOTE ADULT - ASSESSMENT
69 yo M current smoker, COPD and chronic hypoxic respiratory failure on 3-4L home O2, JENSEN, CAD, HTN, CKD stage 3, HLD and AAA presents to the ED with shortness of breath, cough and fevers admitted with acute on chronic hypoxic respiratory failure d/t COPD exacerbation and underlying pneumonia,   COVID + called for goals of care.

## 2020-03-26 NOTE — PROGRESS NOTE ADULT - PROBLEM SELECTOR PLAN 1
multifactorial dyspnea/resp failure-copd, PNA, CAD, debility, ?PE--O 2 sat above 90%--now intubated and sedated--wean fio2 as able multifactorial dyspnea/resp failure-copd, PNA, CAD, debility, ?PE--O 2 sat above 90%--now intubated and sedated--NO weaning at present  TO consider terminal wean-DNR

## 2020-03-26 NOTE — PROGRESS NOTE ADULT - REASON FOR ADMISSION
ARDS
Respiratory Failure
shortness of breath

## 2020-03-26 NOTE — DISCHARGE NOTE FOR THE EXPIRED PATIENT - SECONDARY DIAGNOSIS.
Pneumonia SARS-associated coronavirus as cause of disease classified elsewhere ARDS (adult respiratory distress syndrome)

## 2020-03-27 LAB — G6PD RBC-CCNC: 15.1 U/G HGB — SIGNIFICANT CHANGE UP (ref 7–20.5)

## 2020-03-30 LAB
CULTURE RESULTS: SIGNIFICANT CHANGE UP
CULTURE RESULTS: SIGNIFICANT CHANGE UP
SPECIMEN SOURCE: SIGNIFICANT CHANGE UP
SPECIMEN SOURCE: SIGNIFICANT CHANGE UP

## 2020-05-28 PROCEDURE — 82955 ASSAY OF G6PD ENZYME: CPT

## 2020-05-28 PROCEDURE — 80076 HEPATIC FUNCTION PANEL: CPT

## 2020-05-28 PROCEDURE — 85652 RBC SED RATE AUTOMATED: CPT

## 2020-05-28 PROCEDURE — 71260 CT THORAX DX C+: CPT

## 2020-05-28 PROCEDURE — 82550 ASSAY OF CK (CPK): CPT

## 2020-05-28 PROCEDURE — 87581 M.PNEUMON DNA AMP PROBE: CPT

## 2020-05-28 PROCEDURE — 80048 BASIC METABOLIC PNL TOTAL CA: CPT

## 2020-05-28 PROCEDURE — 93005 ELECTROCARDIOGRAM TRACING: CPT

## 2020-05-28 PROCEDURE — 87798 DETECT AGENT NOS DNA AMP: CPT

## 2020-05-28 PROCEDURE — 84295 ASSAY OF SERUM SODIUM: CPT

## 2020-05-28 PROCEDURE — 83880 ASSAY OF NATRIURETIC PEPTIDE: CPT

## 2020-05-28 PROCEDURE — U0001: CPT

## 2020-05-28 PROCEDURE — 82553 CREATINE MB FRACTION: CPT

## 2020-05-28 PROCEDURE — 87633 RESP VIRUS 12-25 TARGETS: CPT

## 2020-05-28 PROCEDURE — 74177 CT ABD & PELVIS W/CONTRAST: CPT

## 2020-05-28 PROCEDURE — 83605 ASSAY OF LACTIC ACID: CPT

## 2020-05-28 PROCEDURE — 87449 NOS EACH ORGANISM AG IA: CPT

## 2020-05-28 PROCEDURE — 84100 ASSAY OF PHOSPHORUS: CPT

## 2020-05-28 PROCEDURE — 87086 URINE CULTURE/COLONY COUNT: CPT

## 2020-05-28 PROCEDURE — 82728 ASSAY OF FERRITIN: CPT

## 2020-05-28 PROCEDURE — 82947 ASSAY GLUCOSE BLOOD QUANT: CPT

## 2020-05-28 PROCEDURE — 94002 VENT MGMT INPAT INIT DAY: CPT

## 2020-05-28 PROCEDURE — 85379 FIBRIN DEGRADATION QUANT: CPT

## 2020-05-28 PROCEDURE — 83935 ASSAY OF URINE OSMOLALITY: CPT

## 2020-05-28 PROCEDURE — 94660 CPAP INITIATION&MGMT: CPT

## 2020-05-28 PROCEDURE — 82435 ASSAY OF BLOOD CHLORIDE: CPT

## 2020-05-28 PROCEDURE — 82803 BLOOD GASES ANY COMBINATION: CPT

## 2020-05-28 PROCEDURE — 83735 ASSAY OF MAGNESIUM: CPT

## 2020-05-28 PROCEDURE — 86140 C-REACTIVE PROTEIN: CPT

## 2020-05-28 PROCEDURE — 80053 COMPREHEN METABOLIC PANEL: CPT

## 2020-05-28 PROCEDURE — 85027 COMPLETE CBC AUTOMATED: CPT

## 2020-05-28 PROCEDURE — 82962 GLUCOSE BLOOD TEST: CPT

## 2020-05-28 PROCEDURE — 96365 THER/PROPH/DIAG IV INF INIT: CPT

## 2020-05-28 PROCEDURE — 87486 CHLMYD PNEUM DNA AMP PROBE: CPT

## 2020-05-28 PROCEDURE — 87040 BLOOD CULTURE FOR BACTERIA: CPT

## 2020-05-28 PROCEDURE — 71045 X-RAY EXAM CHEST 1 VIEW: CPT

## 2020-05-28 PROCEDURE — 85014 HEMATOCRIT: CPT

## 2020-05-28 PROCEDURE — 87635 SARS-COV-2 COVID-19 AMP PRB: CPT

## 2020-05-28 PROCEDURE — 94640 AIRWAY INHALATION TREATMENT: CPT

## 2020-05-28 PROCEDURE — 96375 TX/PRO/DX INJ NEW DRUG ADDON: CPT

## 2020-05-28 PROCEDURE — 84300 ASSAY OF URINE SODIUM: CPT

## 2020-05-28 PROCEDURE — 84145 PROCALCITONIN (PCT): CPT

## 2020-05-28 PROCEDURE — 82330 ASSAY OF CALCIUM: CPT

## 2020-05-28 PROCEDURE — 84484 ASSAY OF TROPONIN QUANT: CPT

## 2020-05-28 PROCEDURE — 84132 ASSAY OF SERUM POTASSIUM: CPT

## 2020-05-28 PROCEDURE — 87631 RESP VIRUS 3-5 TARGETS: CPT

## 2020-05-28 PROCEDURE — 85730 THROMBOPLASTIN TIME PARTIAL: CPT

## 2020-05-28 PROCEDURE — 83615 LACTATE (LD) (LDH) ENZYME: CPT

## 2020-05-28 PROCEDURE — 86360 T CELL ABSOLUTE COUNT/RATIO: CPT

## 2020-05-28 PROCEDURE — 36600 WITHDRAWAL OF ARTERIAL BLOOD: CPT

## 2020-05-28 PROCEDURE — 99285 EMERGENCY DEPT VISIT HI MDM: CPT | Mod: 25

## 2020-05-28 PROCEDURE — 85610 PROTHROMBIN TIME: CPT

## 2020-05-28 PROCEDURE — 81001 URINALYSIS AUTO W/SCOPE: CPT

## 2021-02-12 NOTE — ED ADULT NURSE NOTE - BOWEL SOUNDS RUQ
Pt and pt's son state the pt has been having left leg pain for the past couple months which has continued to get worse. Pt had an MRI at [facility] in the middle of December which showed he has blockages to his left upper leg that he has an appointment next week with a vascular surgeon to address. Pt has a home health nurse that instructed him to go to the ED yesterday but the family was unable to bring him here. The home health wanted him brought to [place] and not back to [facility] for a second opinion. Pt and pt's son states the pt's toes are turning black, which started as his big toe in December and is now radiating to all of them.    present

## 2021-04-05 NOTE — DISCHARGE NOTE ADULT - FUNCTIONAL SCREEN CURRENT LEVEL: DRESSING, MLM
Pharmacy requests refill:  simvastatin (ZOCOR) 40 MG tablet Take 1 tablet by mouth every evening  Last refill:  01/06/2021 #90 with no refill  LOV:  01/08/2020, no appt scheduled  Refilled once per protocol    RECEPTION:  Please call pt to make appt (45 minute) for further refills.  Thank you.     (2) assistive person (0) independent

## 2021-04-29 NOTE — PROGRESS NOTE ADULT - PROBLEM SELECTOR PLAN 6
- per CTA, No dissection but enlarged 9.2cm   - need close outpatient follow up for AAA repair pending GOC 0 = understands/communicates without difficulty

## 2023-01-08 NOTE — DIETITIAN INITIAL EVALUATION ADULT. - PROBLEM SELECTOR PROBLEM 6
"Chief Complaint   Patient presents with     Foreign Body in Eye     Right eye- wood chip while splitting wood 1/6/23         Initial /60   Pulse 74   Temp 96.8  F (36  C) (Tympanic)   Resp 16   Ht 1.708 m (5' 7.25\")   Wt 76.7 kg (169 lb 3.2 oz)   SpO2 93%   BMI 26.30 kg/m   Estimated body mass index is 26.3 kg/m  as calculated from the following:    Height as of this encounter: 1.708 m (5' 7.25\").    Weight as of this encounter: 76.7 kg (169 lb 3.2 oz).         Norma J. Gosselin, EDNA   "
Abdominal aortic aneurysm (AAA) without rupture

## 2023-04-04 NOTE — DISCHARGE NOTE FOR THE EXPIRED PATIENT - AUTOPSY OFFERED
[Dear  ___] : Dear  [unfilled], [Courtesy Letter:] : I had the pleasure of seeing your patient, [unfilled], in my office today. [Please see my note below.] : Please see my note below. [Sincerely,] : Sincerely, [FreeTextEntry2] : Mal Brandt MD\par [address]\[place] [address]  no

## 2023-11-02 NOTE — ED ADULT TRIAGE NOTE - BSA (M2)
- Currently on IABP 1:1; If MAP acceptable once of pressor then will begin to induct vasodilators as tolerated in hopes to wean IABP  - Currently on Levo 0.03mcg/kg; wean as tolerated  -PRN Diuresis CVP goal <10  - Trend perfusion markers daily, MVO2, BMP, Lactate and LFT's  - Strict I/O's 2.44

## 2023-11-13 NOTE — PROCEDURE NOTE - NSANTIMICROB_VASC_A_CORE
----- Message from Elvie Velez MD sent at 11/6/2023  1:57 PM CST -----  Urine culture positive for Klebsiella sensitive to cephalosporin continue with the antibiotic and close follow-up with primary care physician ASAP  
----- Message from Elvie Velez MD sent at 11/6/2023  1:57 PM CST -----  Urine cultures positive for Klebsiella.  Recommend patient to follow-up with primary care physician ASAP  
First attempt to reach patient with test results.  Number not authorized.  Unable to leave message.  
Unable to reach by phone.  Letter sent.  
Yes

## 2023-12-21 NOTE — DIETITIAN INITIAL EVALUATION ADULT. - ADD RECOMMEND
PT WAS IN THIS WEEK AND WAS GIVEN RX FOR TRAZODONE AND IT IS NOT WORKING AND WHEN SHE DOES WAKE UP SHE FEELS LIKE A ZOMBIE. WOULD LIKE TO GO BACK ON AMBIEN. [facility]    would consider: Vital AF at goal of 35ml/hr x24hrs (840ml volume, 1008cal, 63 Gm Prot) +4 packets of No Carb Prosource, to provide total 1240cal, 123 Gm Prot (1.6 Gm Prot based on wt of 75.2kg) and along c current 792cal from Propofol at 30ml/hr (if running for 24hrs), pt would receive 2032cal (16cal/kg based on wt of 124.4kg)

## 2024-04-30 NOTE — PATIENT PROFILE ADULT - BILL PAYMENT
[FreeTextEntry1] : LLE arterial duplex was done in the office that demonstrated diffuse fibrocalcific plaque noted in femoropopliteal and tibial arteries. Patent femoral/popliteal arteries. PT and peroneal arteries are occluded. no

## 2025-02-11 NOTE — PROGRESS NOTE ADULT - PROBLEM SELECTOR PROBLEM 3
General Sunscreen Counseling: I recommended a broad-spectrum sunscreen with a SPF of 30 or higher.  I explained that SPF 30 sunscreens block approximately 97 percent of the sun's harmful rays.  Sunscreens should be applied at least 15 minutes prior to expected sun exposure and then every 2 hours after that as long as sun exposure continues. If swimming or exercising sunscreen should be reapplied every 45 minutes to an hour after getting wet or sweating.  One ounce, or the equivalent of a shot glass full of sunscreen, is adequate to protect the skin not covered by a bathing suit. I also recommended a lip balm with a sunscreen as well. Sun protective clothing can be used in lieu of sunscreen but must be worn the entire time you are exposed to the sun's rays.
Detail Level: Detailed
Hypertension, unspecified type
Hypertension, unspecified type
Hyponatremia
Hyponatremia
Metabolic encephalopathy
Pneumonia
Pneumonia